# Patient Record
Sex: MALE | Race: WHITE | NOT HISPANIC OR LATINO | Employment: OTHER | URBAN - METROPOLITAN AREA
[De-identification: names, ages, dates, MRNs, and addresses within clinical notes are randomized per-mention and may not be internally consistent; named-entity substitution may affect disease eponyms.]

---

## 2017-01-10 RX ORDER — RIVAROXABAN 20 MG/1
TABLET, FILM COATED ORAL
Qty: 90 TABLET | Refills: 0 | Status: SHIPPED | OUTPATIENT
Start: 2017-01-10 | End: 2017-10-23 | Stop reason: SDUPTHER

## 2017-08-15 ENCOUNTER — TELEPHONE (OUTPATIENT)
Dept: CARDIOLOGY | Facility: CLINIC | Age: 77
End: 2017-08-15

## 2017-09-14 RX ORDER — DILTIAZEM HYDROCHLORIDE 120 MG/1
CAPSULE, EXTENDED RELEASE ORAL
Qty: 90 CAPSULE | Refills: 0 | OUTPATIENT
Start: 2017-09-14

## 2018-05-14 ENCOUNTER — OFFICE VISIT (OUTPATIENT)
Dept: CARDIOLOGY | Facility: CLINIC | Age: 78
End: 2018-05-14

## 2018-05-14 VITALS
WEIGHT: 213 LBS | BODY MASS INDEX: 29.82 KG/M2 | HEIGHT: 71 IN | SYSTOLIC BLOOD PRESSURE: 148 MMHG | DIASTOLIC BLOOD PRESSURE: 87 MMHG | HEART RATE: 75 BPM

## 2018-05-14 DIAGNOSIS — R06.02 SHORTNESS OF BREATH: ICD-10-CM

## 2018-05-14 DIAGNOSIS — R06.02 SOB (SHORTNESS OF BREATH): Primary | ICD-10-CM

## 2018-05-14 DIAGNOSIS — Z79.01 ANTICOAGULATED: ICD-10-CM

## 2018-05-14 DIAGNOSIS — R07.2 PRECORDIAL PAIN: ICD-10-CM

## 2018-05-14 DIAGNOSIS — R94.31 ABNORMAL EKG: ICD-10-CM

## 2018-05-14 DIAGNOSIS — I48.0 PAROXYSMAL ATRIAL FIBRILLATION (HCC): ICD-10-CM

## 2018-05-14 PROCEDURE — 93000 ELECTROCARDIOGRAM COMPLETE: CPT | Performed by: INTERNAL MEDICINE

## 2018-05-14 PROCEDURE — 99214 OFFICE O/P EST MOD 30 MIN: CPT | Performed by: INTERNAL MEDICINE

## 2018-05-14 RX ORDER — WARFARIN SODIUM 5 MG/1
TABLET ORAL
Qty: 30 TABLET | Refills: 3 | Status: SHIPPED | OUTPATIENT
Start: 2018-05-14 | End: 2018-05-21 | Stop reason: ALTCHOICE

## 2018-05-14 NOTE — PROGRESS NOTES
Subjective:       Doc Coyne is a 77 y.o. male who here for follow up    CC  Cp, tightness, sob  HPI  77 his old male with known history of the atrial Coblation, shortness of breath anticoagulation is complaining of the chest pains and tightness in chest , mild to moderate in intensity retrosternal associated with the shortness of breath     Problem List Items Addressed This Visit        Cardiovascular and Mediastinum    Atrial fibrillation    Relevant Orders    Protime-INR       Respiratory    Shortness of breath       Nervous and Auditory    Chest pain       Hematopoietic and Hemostatic    Anticoagulated      Other Visit Diagnoses     SOB (shortness of breath)    -  Primary    Relevant Orders    Stress Test With Myocardial Perfusion One Day    Adult Transthoracic Echo Complete W/ Cont if Necessary Per Protocol    Protime-INR    Abnormal EKG        Relevant Orders    Stress Test With Myocardial Perfusion One Day    Adult Transthoracic Echo Complete W/ Cont if Necessary Per Protocol    Protime-INR        .    The following portions of the patient's history were reviewed and updated as appropriate: allergies, current medications, past family history, past medical history, past social history, past surgical history and problem list.    Past Medical History:   Diagnosis Date   • Atherosclerotic heart disease of native coronary artery without angina pectoris    • Atrial fibrillation    • Benign prostatic hypertrophy    • Chest pain    • Diabetes mellitus    • Hypertension     reports that he has quit smoking. He has never used smokeless tobacco. He reports that he does not drink alcohol or use drugs.  Family History   Problem Relation Age of Onset   • Hypertension Father    • Heart failure Father    • Hyperlipidemia Father    • Heart disease Father    • Heart attack Father    • Heart attack Sister        Review of Systems  Constitutional: No wt loss, fever, fatigue  Gastrointestinal: No nausea, abdominal  "pain  Behavioral/Psych: No insomnia or anxiety   Cardiovascular Chest tightness  Objective:       Physical Exam           Physical Exam  /87   Pulse 75   Ht 180.3 cm (71\")   Wt 96.6 kg (213 lb)   BMI 29.71 kg/m²     General appearance: NAD, conversant   Eyes: anicteric sclerae, moist conjunctivae; no lid-lag; PERRLA   HENT: Atraumatic; oropharynx clear with moist mucous membranes and no mucosal ulcerations;  normal hard and soft palate   Neck: Trachea midline; FROM, supple, no thyromegaly or lymphadenopathy   Lungs: CTA, with normal respiratory effort and no intercostal retractions   CV: S1-S2 regular, no murmurs, no rub, no gallop   Abdomen: Soft, non-tender; no masses or HSM   Extremities: No peripheral edema or extremity lymphadenopathy  Skin: Normal temperature, turgor and texture; no rash, ulcers or subcutaneous nodules   Psych: Appropriate affect, alert and oriented to person, place and time           Cardiographics  @  ECG 12 Lead  Date/Time: 5/14/2018 12:38 PM  Performed by: NITA PIMENTEL  Authorized by: NITA PIMENTEL   Comparison: compared with previous ECG   Similar to previous ECG  Rhythm: atrial fibrillation  ST Flattening: all  Clinical impression: abnormal ECG            Echocardiogram:        Current Outpatient Prescriptions:   •  CARTIA  MG 24 hr capsule, TAKE 1 CAPSULE BY MOUTH DAILY, Disp: 90 capsule, Rfl: 5  •  lisinopril (PRINIVIL,ZESTRIL) 20 MG tablet, Take 1 tablet by mouth daily., Disp: , Rfl:   •  metFORMIN (GLUCOPHAGE) 500 MG tablet, Take 1 tablet by mouth every 12 (twelve) hours. TAKE 1 TABLET BY MOUTH EVERY 12 HOURS WITH FOOD, Disp: , Rfl:   •  metoprolol succinate XL (TOPROL-XL) 50 MG 24 hr tablet, Take 1 tablet by mouth daily., Disp: , Rfl:   •  NITROSTAT 0.4 MG SL tablet, , Disp: , Rfl: 1  •  rivaroxaban (XARELTO) 20 MG tablet, Take 1 tablet by mouth Daily With Dinner., Disp: 28 tablet, Rfl: 0   Assessment:        Patient Active Problem List   Diagnosis "   • Atrial fibrillation   • Chest pain   • Shortness of breath   • Preop cardiovascular exam               Plan:            ICD-10-CM ICD-9-CM   1. SOB (shortness of breath) R06.02 786.05   2. Abnormal EKG R94.31 794.31   3. Paroxysmal atrial fibrillation I48.0 427.31   4. Shortness of breath R06.02 786.05   5. Anticoagulated Z79.01 V58.61   6. Precordial pain R07.2 786.51     1. SOB (shortness of breath)  Considering the patient's symptoms as well as clinical situation and  EKG findings, along with cardiac risk factors, ischemic workup is necessary to rule out ischemic cardiomyopathy, stress induced arrhythmias, and functional capacity for diagnosis as well as prognostic consideration    Considering patient's medical condition as well as the risk factors, patient will require echocardiogram for further evaluation for the LV function, four-chamber evaluation, including the pressures, valvular function and  pericardial disease and pericardial effusion    - Stress Test With Myocardial Perfusion One Day  - Adult Transthoracic Echo Complete W/ Cont if Necessary Per Protocol  - Protime-INR; Standing    2. Abnormal EKG  Considering the patient's symptoms as well as clinical situation and  EKG findings, along with cardiac risk factors, ischemic workup is necessary to rule out ischemic cardiomyopathy, stress induced arrhythmias, and functional capacity for diagnosis as well as prognostic consideration    - Stress Test With Myocardial Perfusion One Day  - Adult Transthoracic Echo Complete W/ Cont if Necessary Per Protocol  - Protime-INR; Standing    3. Paroxysmal atrial fibrillation  Under control  - Protime-INR; Standing    4. Shortness of breath  Considering the patient's symptoms as well as clinical situation and  EKG findings, along with cardiac risk factors, ischemic workup is necessary to rule out ischemic cardiomyopathy, stress induced arrhythmias, and functional capacity for diagnosis as well as prognostic  consideration      5. Anticoagulated  Pros and cons as well as indication of the anticoagulation has been explained to the patient in detail    There are no obvious complications at this stage    Risk of  the bleedings has been explained    Need for the regular blood workup and adjust the dose has been explained    Need for proper follow-up on anticoagulation also has been explained      6. Precordial pain         Start coumadin  5mg 10 for 2 days    Than 2.5 mg po daily    Pros and cons of this new medication / change medication has been explained to  the patient    Possible side effects has been explained    Associated need of the blood  Work has been explained    Need for the compliance of the medication has been explained      brisa Ayala'    See us 2-4 wks  COUNSELING:    Doc Islas was given to patient for the following topics: diagnostic results, risk factor reductions, impressions, risks and benefits of treatment options and importance of treatment compliance .       SMOKING COUNSELING:    Counseling given: Not Answered      EMR Dragon/Transcription disclaimer:   Much of this encounter note is an electronic transcription/translation of spoken language to printed text. The electronic translation of spoken language may permit erroneous, or at times, nonsensical words or phrases to be inadvertently transcribed; Although I have reviewed the note for such errors, some may still exist.

## 2018-05-18 ENCOUNTER — TELEPHONE (OUTPATIENT)
Dept: CARDIOLOGY | Facility: CLINIC | Age: 78
End: 2018-05-18

## 2018-05-21 ENCOUNTER — ANTICOAGULATION VISIT (OUTPATIENT)
Dept: CARDIOLOGY | Facility: CLINIC | Age: 78
End: 2018-05-21

## 2018-05-21 ENCOUNTER — HOSPITAL ENCOUNTER (OUTPATIENT)
Dept: CARDIOLOGY | Facility: HOSPITAL | Age: 78
Discharge: HOME | End: 2018-05-21
Admitting: INTERNAL MEDICINE

## 2018-05-21 LAB — INR PPP: 1.2

## 2018-05-21 PROCEDURE — 85610 PROTHROMBIN TIME: CPT | Performed by: INTERNAL MEDICINE

## 2018-05-21 NOTE — PROGRESS NOTES
Mr Coyne does not want to be on warfarin. Has been on xarelto in the past and wants to go back on this. Spoke with dr vera he states patient may be started on xarelto after holding warfarin for 2 days.   Samples and instructions provided to mr coyne.

## 2018-06-06 ENCOUNTER — APPOINTMENT (OUTPATIENT)
Dept: CARDIOLOGY | Facility: HOSPITAL | Age: 78
End: 2018-06-06
Attending: INTERNAL MEDICINE

## 2018-06-06 ENCOUNTER — HOSPITAL ENCOUNTER (OUTPATIENT)
Dept: CARDIOLOGY | Facility: HOSPITAL | Age: 78
Discharge: HOME | End: 2018-06-06
Attending: INTERNAL MEDICINE | Admitting: INTERNAL MEDICINE

## 2018-06-06 ENCOUNTER — HOSPITAL ENCOUNTER (OUTPATIENT)
Dept: CARDIOLOGY | Facility: HOSPITAL | Age: 78
Discharge: HOME | End: 2018-06-06
Attending: INTERNAL MEDICINE

## 2018-06-06 VITALS
RESPIRATION RATE: 20 BRPM | WEIGHT: 213 LBS | DIASTOLIC BLOOD PRESSURE: 72 MMHG | BODY MASS INDEX: 29.82 KG/M2 | OXYGEN SATURATION: 97 % | SYSTOLIC BLOOD PRESSURE: 116 MMHG | HEIGHT: 71 IN | HEART RATE: 64 BPM

## 2018-06-06 VITALS — SYSTOLIC BLOOD PRESSURE: 135 MMHG | DIASTOLIC BLOOD PRESSURE: 83 MMHG | HEART RATE: 85 BPM

## 2018-06-06 LAB
BH CV ECHO MEAS - ACS: 1.5 CM
BH CV ECHO MEAS - AI DEC SLOPE: 159 CM/SEC^2
BH CV ECHO MEAS - AI MAX PG: 56.3 MMHG
BH CV ECHO MEAS - AI MAX VEL: 375 CM/SEC
BH CV ECHO MEAS - AI P1/2T: 690.8 MSEC
BH CV ECHO MEAS - AO MAX PG (FULL): 8.7 MMHG
BH CV ECHO MEAS - AO MAX PG: 10.5 MMHG
BH CV ECHO MEAS - AO MEAN PG (FULL): 5 MMHG
BH CV ECHO MEAS - AO MEAN PG: 6 MMHG
BH CV ECHO MEAS - AO ROOT AREA (BSA CORRECTED): 1.6
BH CV ECHO MEAS - AO ROOT AREA: 9.1 CM^2
BH CV ECHO MEAS - AO ROOT DIAM: 3.4 CM
BH CV ECHO MEAS - AO V2 MAX: 162 CM/SEC
BH CV ECHO MEAS - AO V2 MEAN: 112 CM/SEC
BH CV ECHO MEAS - AO V2 VTI: 35.2 CM
BH CV ECHO MEAS - AVA(I,A): 2 CM^2
BH CV ECHO MEAS - AVA(I,D): 2 CM^2
BH CV ECHO MEAS - AVA(V,A): 1.9 CM^2
BH CV ECHO MEAS - AVA(V,D): 1.9 CM^2
BH CV ECHO MEAS - BSA(HAYCOCK): 2.2 M^2
BH CV ECHO MEAS - BSA: 2.2 M^2
BH CV ECHO MEAS - BZI_BMI: 29.7 KILOGRAMS/M^2
BH CV ECHO MEAS - BZI_METRIC_HEIGHT: 180.3 CM
BH CV ECHO MEAS - BZI_METRIC_WEIGHT: 96.6 KG
BH CV ECHO MEAS - CONTRAST EF (2CH): 53.5 ML/M^2
BH CV ECHO MEAS - CONTRAST EF 4CH: 51.2 ML/M^2
BH CV ECHO MEAS - EDV(CUBED): 125 ML
BH CV ECHO MEAS - EDV(MOD-SP2): 71 ML
BH CV ECHO MEAS - EDV(MOD-SP4): 86 ML
BH CV ECHO MEAS - EDV(TEICH): 118.2 ML
BH CV ECHO MEAS - EF(CUBED): 65.7 %
BH CV ECHO MEAS - EF(MOD-BP): 51 %
BH CV ECHO MEAS - EF(MOD-SP2): 53.5 %
BH CV ECHO MEAS - EF(MOD-SP4): 51.2 %
BH CV ECHO MEAS - EF(TEICH): 57 %
BH CV ECHO MEAS - ESV(CUBED): 42.9 ML
BH CV ECHO MEAS - ESV(MOD-SP2): 33 ML
BH CV ECHO MEAS - ESV(MOD-SP4): 42 ML
BH CV ECHO MEAS - ESV(TEICH): 50.9 ML
BH CV ECHO MEAS - FS: 30 %
BH CV ECHO MEAS - IVS/LVPW: 1.2
BH CV ECHO MEAS - IVSD: 1.2 CM
BH CV ECHO MEAS - LA DIMENSION: 5.6 CM
BH CV ECHO MEAS - LA/AO: 1.6
BH CV ECHO MEAS - LAT PEAK E' VEL: 12.4 CM/SEC
BH CV ECHO MEAS - LV DIASTOLIC VOL/BSA (35-75): 39.7 ML/M^2
BH CV ECHO MEAS - LV MASS(C)D: 207.1 GRAMS
BH CV ECHO MEAS - LV MASS(C)DI: 95.6 GRAMS/M^2
BH CV ECHO MEAS - LV MAX PG: 1.8 MMHG
BH CV ECHO MEAS - LV MEAN PG: 1 MMHG
BH CV ECHO MEAS - LV SYSTOLIC VOL/BSA (12-30): 19.4 ML/M^2
BH CV ECHO MEAS - LV V1 MAX: 67.2 CM/SEC
BH CV ECHO MEAS - LV V1 MEAN: 52.6 CM/SEC
BH CV ECHO MEAS - LV V1 VTI: 15.5 CM
BH CV ECHO MEAS - LVIDD: 5 CM
BH CV ECHO MEAS - LVIDS: 3.5 CM
BH CV ECHO MEAS - LVLD AP2: 6.2 CM
BH CV ECHO MEAS - LVLD AP4: 6.9 CM
BH CV ECHO MEAS - LVLS AP2: 5.6 CM
BH CV ECHO MEAS - LVLS AP4: 6.3 CM
BH CV ECHO MEAS - LVOT AREA (M): 4.5 CM^2
BH CV ECHO MEAS - LVOT AREA: 4.5 CM^2
BH CV ECHO MEAS - LVOT DIAM: 2.4 CM
BH CV ECHO MEAS - LVPWD: 1 CM
BH CV ECHO MEAS - MED PEAK E' VEL: 11.1 CM/SEC
BH CV ECHO MEAS - MV DEC SLOPE: 260 CM/SEC^2
BH CV ECHO MEAS - MV DEC TIME: 0.17 SEC
BH CV ECHO MEAS - MV E MAX VEL: 101 CM/SEC
BH CV ECHO MEAS - MV MAX PG: 4.8 MMHG
BH CV ECHO MEAS - MV MEAN PG: 1 MMHG
BH CV ECHO MEAS - MV P1/2T MAX VEL: 107 CM/SEC
BH CV ECHO MEAS - MV P1/2T: 120.5 MSEC
BH CV ECHO MEAS - MV V2 MAX: 110 CM/SEC
BH CV ECHO MEAS - MV V2 MEAN: 52.3 CM/SEC
BH CV ECHO MEAS - MV V2 VTI: 24 CM
BH CV ECHO MEAS - MVA P1/2T LCG: 2.1 CM^2
BH CV ECHO MEAS - MVA(P1/2T): 1.8 CM^2
BH CV ECHO MEAS - MVA(VTI): 2.9 CM^2
BH CV ECHO MEAS - PA ACC TIME: 0.08 SEC
BH CV ECHO MEAS - PA MAX PG (FULL): 2.7 MMHG
BH CV ECHO MEAS - PA MAX PG: 4 MMHG
BH CV ECHO MEAS - PA PR(ACCEL): 42.1 MMHG
BH CV ECHO MEAS - PA V2 MAX: 100 CM/SEC
BH CV ECHO MEAS - PI END-D VEL: 107 CM/SEC
BH CV ECHO MEAS - PVA(V,A): 2.6 CM^2
BH CV ECHO MEAS - PVA(V,D): 2.6 CM^2
BH CV ECHO MEAS - QP/QS: 0.84
BH CV ECHO MEAS - RAP SYSTOLE: 3 MMHG
BH CV ECHO MEAS - RV MAX PG: 1.3 MMHG
BH CV ECHO MEAS - RV MEAN PG: 1 MMHG
BH CV ECHO MEAS - RV V1 MAX: 56.9 CM/SEC
BH CV ECHO MEAS - RV V1 MEAN: 40.9 CM/SEC
BH CV ECHO MEAS - RV V1 VTI: 13 CM
BH CV ECHO MEAS - RVDD: 3.6 CM
BH CV ECHO MEAS - RVOT AREA: 4.5 CM^2
BH CV ECHO MEAS - RVOT DIAM: 2.4 CM
BH CV ECHO MEAS - RVSP: 25.8 MMHG
BH CV ECHO MEAS - SI(AO): 147.5 ML/M^2
BH CV ECHO MEAS - SI(CUBED): 37.9 ML/M^2
BH CV ECHO MEAS - SI(LVOT): 32.4 ML/M^2
BH CV ECHO MEAS - SI(MOD-SP2): 17.5 ML/M^2
BH CV ECHO MEAS - SI(MOD-SP4): 20.3 ML/M^2
BH CV ECHO MEAS - SI(TEICH): 31.1 ML/M^2
BH CV ECHO MEAS - SV(AO): 319.6 ML
BH CV ECHO MEAS - SV(CUBED): 82.1 ML
BH CV ECHO MEAS - SV(LVOT): 70.1 ML
BH CV ECHO MEAS - SV(MOD-SP2): 38 ML
BH CV ECHO MEAS - SV(MOD-SP4): 44 ML
BH CV ECHO MEAS - SV(RVOT): 58.8 ML
BH CV ECHO MEAS - SV(TEICH): 67.4 ML
BH CV ECHO MEAS - TR MAX VEL: 239 CM/SEC
BH CV ECHO MEASUREMENTS AVERAGE E/E' RATIO: 8.6
BH CV XLRA - RV BASE: 3.8 CM
BH CV XLRA - RV LENGTH: 6.4 CM
BH CV XLRA - RV MID: 3 CM
BH CV XLRA - TDI S': 12.1 CM/SEC
LEFT ATRIUM VOLUME INDEX: 40 ML/M2
MAXIMAL PREDICTED HEART RATE: 144 BPM
STRESS TARGET HR: 122 BPM

## 2018-06-06 PROCEDURE — 93306 TTE W/DOPPLER COMPLETE: CPT | Performed by: INTERNAL MEDICINE

## 2018-06-06 PROCEDURE — 93306 TTE W/DOPPLER COMPLETE: CPT

## 2018-06-06 PROCEDURE — 0399T ADULT TRANSTHORACIC ECHO COMPLETE W/ CONT IF NECESSARY PER PROTOCOL: CPT | Performed by: INTERNAL MEDICINE

## 2018-06-06 PROCEDURE — 25010000002 REGADENOSON 0.4 MG/5ML SOLUTION: Performed by: INTERNAL MEDICINE

## 2018-06-06 PROCEDURE — 0 TECHNETIUM SESTAMIBI: Performed by: INTERNAL MEDICINE

## 2018-06-06 PROCEDURE — 78452 HT MUSCLE IMAGE SPECT MULT: CPT | Performed by: INTERNAL MEDICINE

## 2018-06-06 PROCEDURE — A9500 TC99M SESTAMIBI: HCPCS | Performed by: INTERNAL MEDICINE

## 2018-06-06 PROCEDURE — 78452 HT MUSCLE IMAGE SPECT MULT: CPT

## 2018-06-06 PROCEDURE — 0399T HC MYOCARDL STRAIN IMAG QUAN ASSMT PER SESS: CPT

## 2018-06-06 PROCEDURE — 93017 CV STRESS TEST TRACING ONLY: CPT

## 2018-06-06 PROCEDURE — 93016 CV STRESS TEST SUPVJ ONLY: CPT | Performed by: INTERNAL MEDICINE

## 2018-06-06 PROCEDURE — 93018 CV STRESS TEST I&R ONLY: CPT | Performed by: INTERNAL MEDICINE

## 2018-06-06 RX ADMIN — TECHNETIUM TC 99M SESTAMIBI 1 DOSE: 1 INJECTION INTRAVENOUS at 10:52

## 2018-06-06 RX ADMIN — TECHNETIUM TC 99M SESTAMIBI 1 DOSE: 1 INJECTION INTRAVENOUS at 07:59

## 2018-06-06 RX ADMIN — REGADENOSON 0.4 MG: 0.08 INJECTION, SOLUTION INTRAVENOUS at 10:52

## 2018-06-07 LAB
BH CV STRESS BP STAGE 1: NORMAL
BH CV STRESS COMMENTS STAGE 1: NORMAL
BH CV STRESS DOSE REGADENOSON STAGE 1: 0.4
BH CV STRESS DURATION MIN STAGE 1: 2
BH CV STRESS DURATION SEC STAGE 1: 27
BH CV STRESS GRADE STAGE 1: 0
BH CV STRESS HR STAGE 1: 103
BH CV STRESS METS STAGE 1: 1.4
BH CV STRESS PROTOCOL 1: NORMAL
BH CV STRESS RECOVERY BP: NORMAL MMHG
BH CV STRESS RECOVERY HR: 88 BPM
BH CV STRESS RECOVERY O2: 98 %
BH CV STRESS SPEED STAGE 1: 1
BH CV STRESS STAGE 1: 1
LV EF NUC BP: 62 %
MAXIMAL PREDICTED HEART RATE: 144 BPM
PERCENT MAX PREDICTED HR: 71.53 %
STRESS BASELINE BP: NORMAL MMHG
STRESS BASELINE HR: 73 BPM
STRESS O2 SAT REST: 97 %
STRESS PERCENT HR: 84 %
STRESS POST ESTIMATED WORKLOAD: 1.4 METS
STRESS POST EXERCISE DUR MIN: 2 MIN
STRESS POST EXERCISE DUR SEC: 27 SEC
STRESS POST PEAK BP: NORMAL MMHG
STRESS POST PEAK HR: 103 BPM
STRESS TARGET HR: 122 BPM

## 2018-09-07 ENCOUNTER — TELEPHONE (OUTPATIENT)
Dept: CARDIOLOGY | Facility: CLINIC | Age: 78
End: 2018-09-07

## 2018-09-07 NOTE — TELEPHONE ENCOUNTER
----- Message from Lori Marinelli sent at 9/6/2018  1:09 PM EDT -----  Regarding: PLEASE CALL XARELTO SAMPLES  PATIENT IS REQUESTING XARELTO SAMPLES BUT I DO NOT SEE THAT ON HIS MED LIST. PLEASE CALL AS HE WANTS TO PICK THEM UP TOMORROW AFTERNOON.  THANKS    Samples ready to be picked up       Called l/m

## 2019-02-21 ENCOUNTER — TELEPHONE (OUTPATIENT)
Dept: CARDIOLOGY | Facility: CLINIC | Age: 79
End: 2019-02-21

## 2019-02-21 NOTE — TELEPHONE ENCOUNTER
----- Message from Lori Marinelli sent at 2/21/2019 10:17 AM EST -----  Regarding: SAMPLES  (XARELTO) 20 MG tablet  PATIENT WOULD LIKE TO  TOMORROW AFTER 1  rivaroxaban (XARELTO) 20 MG tablet

## 2019-07-26 ENCOUNTER — OFFICE VISIT (OUTPATIENT)
Dept: CARDIOLOGY | Facility: CLINIC | Age: 79
End: 2019-07-26

## 2019-07-26 VITALS
BODY MASS INDEX: 29.82 KG/M2 | HEART RATE: 75 BPM | HEIGHT: 71 IN | DIASTOLIC BLOOD PRESSURE: 63 MMHG | SYSTOLIC BLOOD PRESSURE: 116 MMHG | WEIGHT: 213 LBS

## 2019-07-26 DIAGNOSIS — I48.0 PAROXYSMAL ATRIAL FIBRILLATION (HCC): ICD-10-CM

## 2019-07-26 DIAGNOSIS — R07.2 PRECORDIAL PAIN: Primary | ICD-10-CM

## 2019-07-26 PROCEDURE — 99213 OFFICE O/P EST LOW 20 MIN: CPT | Performed by: NURSE PRACTITIONER

## 2019-07-26 PROCEDURE — 93000 ELECTROCARDIOGRAM COMPLETE: CPT | Performed by: NURSE PRACTITIONER

## 2019-07-26 NOTE — PROGRESS NOTES
Patient Name: Doc Coyne  :1940  Age: 79 y.o.  Primary Cardiologist: Joel Rosado MD  Encounter Provider:  NELSON Waldrop      Chief Complaint:   Chief Complaint   Patient presents with   • Atrial Fibrillation   • Shortness of Breath         HPI this is a 71-year-old male, new to this provider, who comes today in follow-up with history of atrial fibrillation, shortness of breath, chest pain, and chronic anticoagulation.  ECG today in office reveals atrial fibrillation, rate 60s.  Patient is anticoagulated on Xarelto and on diltiazem as well as beta-blockade.  Last echo in 2018 revealed mild pulmonic valve regurgitation, calcification of aortic valve, mild dilation of RV C, mild to moderate TR, mild to moderate dilation of LAC, mild MR, EF 51% with no evidence of pericardial effusion.  Stress testing done at that time revealed no evidence of myocardial ischemia.  Catheterization in  revealed normal left main, LAD with early atherosclerotic plaque, circumflex nondominant with no atherosclerotic narrowing, RCA dominant with early atherosclerotic narrowing and mild LV dysfunction, the patient was noted to be best treated medically at that time.  Last lipid panel in May 2019 revealed triglycerides 122  HDL 39 LDL 87, the patient is not currently on statin therapy.  Well-controlled.  Patient is currently sinus rhythm on ECG today.    Patient Active Problem List   Diagnosis   • Atrial fibrillation (CMS/HCC)   • Chest pain   • Shortness of breath   • Preop cardiovascular exam   • Anticoagulated           The following portions of the patient's history were reviewed and updated as appropriate: allergies, current medications, past family history, past medical history, past social history, past surgical history and problem list.    Current Outpatient Medications on File Prior to Visit   Medication Sig Dispense Refill   • lisinopril (PRINIVIL,ZESTRIL) 20 MG tablet Take 1 tablet  by mouth daily.     • metFORMIN (GLUCOPHAGE) 500 MG tablet Take 1 tablet by mouth every 12 (twelve) hours. TAKE 1 TABLET BY MOUTH EVERY 12 HOURS WITH FOOD     • metoprolol succinate XL (TOPROL-XL) 50 MG 24 hr tablet Take 1 tablet by mouth daily.     • rivaroxaban (XARELTO) 20 MG tablet Take 1 tablet by mouth Daily. 30 tablet 0   • CARTIA  MG 24 hr capsule TAKE 1 CAPSULE BY MOUTH DAILY 90 capsule 5   • NITROSTAT 0.4 MG SL tablet   1     No current facility-administered medications on file prior to visit.        Past Medical History:   Diagnosis Date   • Atherosclerotic heart disease of native coronary artery without angina pectoris    • Atrial fibrillation (CMS/HCC)    • Benign prostatic hypertrophy    • Chest pain    • Diabetes mellitus (CMS/HCC)    • Hypertension    • Prostate cancer (CMS/HCC)        Past Surgical History:   Procedure Laterality Date   • COLONOSCOPY     • HEMORRHOIDECTOMY     • PROSTATE SURGERY         Family History   Problem Relation Age of Onset   • Hypertension Father    • Heart failure Father    • Hyperlipidemia Father    • Heart disease Father    • Heart attack Father    • Heart attack Sister        Social History     Socioeconomic History   • Marital status:      Spouse name: Not on file   • Number of children: Not on file   • Years of education: Not on file   • Highest education level: Not on file   Tobacco Use   • Smoking status: Former Smoker   • Smokeless tobacco: Never Used   Substance and Sexual Activity   • Alcohol use: Yes     Comment: OCCASIONALLY   • Drug use: No   • Sexual activity: Defer       Review of Systems   Constitution: Positive for malaise/fatigue. Negative for diaphoresis and weakness.   HENT: Negative for nosebleeds and stridor.    Cardiovascular: Positive for chest pain and dyspnea on exertion. Negative for claudication, irregular heartbeat, leg swelling, near-syncope, orthopnea, palpitations, paroxysmal nocturnal dyspnea and syncope.        Intermittent,  "not active.   Respiratory: Negative for cough, hemoptysis, shortness of breath and wheezing.    Gastrointestinal: Negative for abdominal pain, constipation, diarrhea, hematemesis, hematochezia, melena, nausea and vomiting.   Neurological: Negative for dizziness, focal weakness and light-headedness.       OBJECTIVE:   Vital Signs  Vitals:    07/26/19 1044   BP: 116/63   Pulse: 75     Estimated body mass index is 29.71 kg/m² as calculated from the following:    Height as of this encounter: 180.3 cm (71\").    Weight as of this encounter: 96.6 kg (213 lb).    Physical Exam   Constitutional: He is oriented to person, place, and time. He appears well-developed and well-nourished. No distress.   HENT:   Head: Normocephalic and atraumatic.   Eyes: Conjunctivae and EOM are normal. Pupils are equal, round, and reactive to light.   Neck: Normal range of motion. Neck supple. No JVD present. No tracheal deviation present. No thyromegaly present.   Cardiovascular: Normal rate, normal heart sounds and intact distal pulses. Exam reveals no gallop and no friction rub.   No murmur heard.  Pulmonary/Chest: Effort normal and breath sounds normal. No respiratory distress. He has no wheezes. He has no rales. He exhibits no tenderness.   Abdominal: Soft. Bowel sounds are normal. He exhibits no distension. There is no tenderness.   Musculoskeletal: Normal range of motion. He exhibits no edema, tenderness or deformity.   Neurological: He is alert and oriented to person, place, and time.   Skin: Skin is warm and dry. No rash noted. He is not diaphoretic. No erythema. No pallor.   Psychiatric: He has a normal mood and affect. His behavior is normal.         ECG 12 Lead  Date/Time: 7/26/2019 10:44 AM  Performed by: Tiffanie Maya APRN  Authorized by: Tiffanie Maya APRN   Comparison: compared with previous ECG   Similar to previous ECG  Rhythm: atrial fibrillation  Rate: normal  Conduction: conduction normal  QRS axis: " normal            Cardiac catheterization    CORONARY ANGIOGRAM:   1.  The left main was normal, bifurcated into left anterior descending and  circumflex in a normal fashion.   2.  Left anterior descending shows early atherosclerotic plaque only, including  the diagonal and  branches.   3.  Circumflex artery was nondominant and free of any atherosclerotic  narrowing.   4.  The right coronary artery was dominant with early atherosclerotic  narrowing.   5.  Left ventriculogram showed mild LV dysfunction.      FINAL CONCLUSIONS:   1.  Early atherosclerotic plaque.   2.  Mild LV dysfunction.      DISCUSSION:  Considering the patient's early atherosclerotic plaque with mild  LV dysfunction, the patient will be treated medically.        Stress Testin2018  Interpretation Summary        · Findings consistent with a normal ECG stress test.  · Left ventricular ejection fraction is normal (Calculated EF = 62%).  · Myocardial perfusion imaging indicates a normal myocardial perfusion study with no evidence of ischemia.  · Impressions are consistent with a low risk study.     Asymptomatic for chest pain. ECG is negative for ischemia.   Ectopy: Baseline rhythm is Atrial Fibrillation with rare PVC in recovery  B/P is appropriate.   Pharmacologic study due to Beta-blocker therapy and mobility issues.  Participated in Low Level exercise and tolerance is fair.          Cardiac Echo:  2019  Interpretation Summary     · Mild pulmonic valve regurgitation is present.  · There is calcification of the aortic valve.  · Right ventricular cavity is mildly dilated.  · Mild to moderate tricuspid valve regurgitation is present.  · Left atrial cavity size is mild-to-moderately dilated.  · Mild mitral valve regurgitation is present  · Calculated EF = 51%  · There is no evidence of pericardial effusion.           ASSESSMENT:      Diagnosis Plan   1. Precordial pain  Stress Test With Myocardial Perfusion One Day   2. Paroxysmal  atrial fibrillation (CMS/[facility])  ECG 12 Lead    Stress Test With Myocardial Perfusion One Day         PLAN OF CARE:     1.  CAD-last cardiac catheterization and ischemic work-up as above.  Currently on beta-blockade.    2.  Hypertension-blood pressure in office today is currently controlled.  Patient is on lisinopril, metoprolol, as well as diltiazem.  Continue current medication regimen    Importance of controlling hypertension and blood pressure  checkup on the regular basis has been explained  Hypertension as a silent killer has been discussed  Risk reduction of the weight and regular exercises to control the hypertension has been explained      3.  Hyperlipidemia-currently well controlled off statin therapy.  Repeat lipid panel and CMP in 6 to 12 months.    Risk of the hyperlipidemia, importance of the treatment has been explained  Pros and cons of the statins has been explained  Regular blood workup as well as side effects including the liver failure, myelopathy death has been explained     4.  Atrial fibrillation-patient anticoagulated on Xarelto and rate controlled with beta-blockade.  ECG in office today reveals atrial fibrillation, rate 60s.  Continue current medication regimen.    In the setting of atrial fibrillation, I have discussed with the patient/family/POA the risks and benefits of anticoagulation therapy which include increased risk of bleeding and decreased risk of systemic embolization such as stroke. Patient/family/POA verbalize understanding and agree with treatment plan.        5.  Chest pain-noted to be intermittent, not exacerbated or alleviated by anything of note, not active.  Does not radiate anywhere.  Previous echo as above.  Will obtain updated stress test.  The patient does state he has been under a significant amount of stress as he is going through divorce.    It was explained to the patient that stress testing carries 85% specificity/sensitivity, and does not rule out future cardiac  event.  Risks of the procedure were explained to the patient including shortness of breath, induction of myocardial infarction, and dizziness.  Patient is agreeable to proceeding with stress testing.     Walking Lexiscan, follow-up in 4 weeks or sooner if needed.    Sincerely,   NELSON Waldrop  [facility]  07/26/19  12:47 PM

## 2019-08-08 ENCOUNTER — HOSPITAL ENCOUNTER (OUTPATIENT)
Dept: CARDIOLOGY | Facility: HOSPITAL | Age: 79
Discharge: HOME | End: 2019-08-08

## 2019-08-08 VITALS
WEIGHT: 213 LBS | DIASTOLIC BLOOD PRESSURE: 83 MMHG | HEIGHT: 72 IN | SYSTOLIC BLOOD PRESSURE: 152 MMHG | BODY MASS INDEX: 28.85 KG/M2 | OXYGEN SATURATION: 99 % | RESPIRATION RATE: 16 BRPM | HEART RATE: 60 BPM

## 2019-08-08 PROCEDURE — 93018 CV STRESS TEST I&R ONLY: CPT | Performed by: INTERNAL MEDICINE

## 2019-08-08 PROCEDURE — 25010000002 REGADENOSON 0.4 MG/5ML SOLUTION: Performed by: INTERNAL MEDICINE

## 2019-08-08 PROCEDURE — A9500 TC99M SESTAMIBI: HCPCS | Performed by: INTERNAL MEDICINE

## 2019-08-08 PROCEDURE — 78452 HT MUSCLE IMAGE SPECT MULT: CPT | Performed by: INTERNAL MEDICINE

## 2019-08-08 PROCEDURE — 0 TECHNETIUM SESTAMIBI: Performed by: INTERNAL MEDICINE

## 2019-08-08 PROCEDURE — 93017 CV STRESS TEST TRACING ONLY: CPT

## 2019-08-08 PROCEDURE — 78452 HT MUSCLE IMAGE SPECT MULT: CPT

## 2019-08-08 PROCEDURE — 93016 CV STRESS TEST SUPVJ ONLY: CPT | Performed by: INTERNAL MEDICINE

## 2019-08-08 RX ORDER — LISINOPRIL 30 MG/1
30 TABLET ORAL DAILY
Refills: 2 | COMMUNITY
Start: 2019-08-01 | End: 2020-11-05 | Stop reason: SDUPTHER

## 2019-08-08 RX ADMIN — TECHNETIUM TC 99M SESTAMIBI 1 DOSE: 1 INJECTION INTRAVENOUS at 12:05

## 2019-08-08 RX ADMIN — TECHNETIUM TC 99M SESTAMIBI 1 DOSE: 1 INJECTION INTRAVENOUS at 08:11

## 2019-08-08 RX ADMIN — REGADENOSON 0.4 MG: 0.08 INJECTION, SOLUTION INTRAVENOUS at 12:06

## 2019-08-09 ENCOUNTER — HOSPITAL ENCOUNTER (EMERGENCY)
Facility: HOSPITAL | Age: 79
Discharge: HOME | End: 2019-08-09
Attending: EMERGENCY MEDICINE | Admitting: EMERGENCY MEDICINE

## 2019-08-09 ENCOUNTER — APPOINTMENT (OUTPATIENT)
Dept: CT IMAGING | Facility: HOSPITAL | Age: 79
End: 2019-08-09

## 2019-08-09 VITALS
WEIGHT: 213 LBS | HEIGHT: 71 IN | DIASTOLIC BLOOD PRESSURE: 60 MMHG | SYSTOLIC BLOOD PRESSURE: 119 MMHG | BODY MASS INDEX: 29.82 KG/M2 | OXYGEN SATURATION: 98 % | TEMPERATURE: 97.8 F | HEART RATE: 67 BPM | RESPIRATION RATE: 16 BRPM

## 2019-08-09 DIAGNOSIS — R10.9 RIGHT FLANK PAIN: Primary | ICD-10-CM

## 2019-08-09 DIAGNOSIS — R31.9 HEMATURIA, UNSPECIFIED TYPE: ICD-10-CM

## 2019-08-09 DIAGNOSIS — R93.2 ABNORMAL CT SCAN, GALLBLADDER: ICD-10-CM

## 2019-08-09 DIAGNOSIS — D68.9 COAGULOPATHY (HCC): ICD-10-CM

## 2019-08-09 LAB
ALBUMIN SERPL-MCNC: 4.6 G/DL (ref 3.5–5.2)
ALBUMIN/GLOB SERPL: 1.8 G/DL
ALP SERPL-CCNC: 54 U/L (ref 39–117)
ALT SERPL W P-5'-P-CCNC: 22 U/L (ref 1–41)
ANION GAP SERPL CALCULATED.3IONS-SCNC: 12.5 MMOL/L (ref 5–15)
AST SERPL-CCNC: 17 U/L (ref 1–40)
BACTERIA UR QL AUTO: ABNORMAL /HPF
BASOPHILS # BLD AUTO: 0.04 10*3/MM3 (ref 0–0.2)
BASOPHILS NFR BLD AUTO: 0.6 % (ref 0–1.5)
BILIRUB SERPL-MCNC: 0.7 MG/DL (ref 0.2–1.2)
BILIRUB UR QL STRIP: NEGATIVE
BUN BLD-MCNC: 18 MG/DL (ref 8–23)
BUN/CREAT SERPL: 20.7 (ref 7–25)
CALCIUM SPEC-SCNC: 9.1 MG/DL (ref 8.6–10.5)
CHLORIDE SERPL-SCNC: 103 MMOL/L (ref 98–107)
CLARITY UR: CLEAR
CO2 SERPL-SCNC: 25.5 MMOL/L (ref 22–29)
COLOR UR: YELLOW
CREAT BLD-MCNC: 0.87 MG/DL (ref 0.76–1.27)
DEPRECATED RDW RBC AUTO: 45.8 FL (ref 37–54)
EOSINOPHIL # BLD AUTO: 0.05 10*3/MM3 (ref 0–0.4)
EOSINOPHIL NFR BLD AUTO: 0.7 % (ref 0.3–6.2)
ERYTHROCYTE [DISTWIDTH] IN BLOOD BY AUTOMATED COUNT: 13.6 % (ref 12.3–15.4)
GFR SERPL CREATININE-BSD FRML MDRD: 85 ML/MIN/1.73
GLOBULIN UR ELPH-MCNC: 2.6 GM/DL
GLUCOSE BLD-MCNC: 115 MG/DL (ref 65–99)
GLUCOSE UR STRIP-MCNC: NEGATIVE MG/DL
HCT VFR BLD AUTO: 44.3 % (ref 37.5–51)
HGB BLD-MCNC: 14.8 G/DL (ref 13–17.7)
HGB UR QL STRIP.AUTO: ABNORMAL
HYALINE CASTS UR QL AUTO: ABNORMAL /LPF
IMM GRANULOCYTES # BLD AUTO: 0.09 10*3/MM3 (ref 0–0.05)
IMM GRANULOCYTES NFR BLD AUTO: 1.3 % (ref 0–0.5)
KETONES UR QL STRIP: NEGATIVE
LEUKOCYTE ESTERASE UR QL STRIP.AUTO: NEGATIVE
LIPASE SERPL-CCNC: 19 U/L (ref 13–60)
LYMPHOCYTES # BLD AUTO: 2.2 10*3/MM3 (ref 0.7–3.1)
LYMPHOCYTES NFR BLD AUTO: 31.7 % (ref 19.6–45.3)
MAGNESIUM SERPL-MCNC: 2 MG/DL (ref 1.6–2.4)
MCH RBC QN AUTO: 30.5 PG (ref 26.6–33)
MCHC RBC AUTO-ENTMCNC: 33.4 G/DL (ref 31.5–35.7)
MCV RBC AUTO: 91.3 FL (ref 79–97)
MONOCYTES # BLD AUTO: 0.5 10*3/MM3 (ref 0.1–0.9)
MONOCYTES NFR BLD AUTO: 7.2 % (ref 5–12)
NEUTROPHILS # BLD AUTO: 4.07 10*3/MM3 (ref 1.7–7)
NEUTROPHILS NFR BLD AUTO: 58.5 % (ref 42.7–76)
NITRITE UR QL STRIP: NEGATIVE
NRBC BLD AUTO-RTO: 0 /100 WBC (ref 0–0.2)
PH UR STRIP.AUTO: 6 [PH] (ref 5–8)
PLATELET # BLD AUTO: 155 10*3/MM3 (ref 140–450)
PMV BLD AUTO: 10.7 FL (ref 6–12)
POTASSIUM BLD-SCNC: 4.2 MMOL/L (ref 3.5–5.2)
PROT SERPL-MCNC: 7.2 G/DL (ref 6–8.5)
PROT UR QL STRIP: NEGATIVE
RBC # BLD AUTO: 4.85 10*6/MM3 (ref 4.14–5.8)
RBC # UR: ABNORMAL /HPF
REF LAB TEST METHOD: ABNORMAL
SODIUM BLD-SCNC: 141 MMOL/L (ref 136–145)
SP GR UR STRIP: 1.02 (ref 1–1.03)
SQUAMOUS #/AREA URNS HPF: ABNORMAL /HPF
TSH SERPL DL<=0.05 MIU/L-ACNC: 1.71 MIU/ML (ref 0.27–4.2)
UROBILINOGEN UR QL STRIP: ABNORMAL
WBC NRBC COR # BLD: 6.95 10*3/MM3 (ref 3.4–10.8)
WBC UR QL AUTO: ABNORMAL /HPF

## 2019-08-09 PROCEDURE — 80053 COMPREHEN METABOLIC PANEL: CPT | Performed by: EMERGENCY MEDICINE

## 2019-08-09 PROCEDURE — 81001 URINALYSIS AUTO W/SCOPE: CPT | Performed by: EMERGENCY MEDICINE

## 2019-08-09 PROCEDURE — 83690 ASSAY OF LIPASE: CPT | Performed by: EMERGENCY MEDICINE

## 2019-08-09 PROCEDURE — 85025 COMPLETE CBC W/AUTO DIFF WBC: CPT | Performed by: EMERGENCY MEDICINE

## 2019-08-09 PROCEDURE — 83735 ASSAY OF MAGNESIUM: CPT | Performed by: EMERGENCY MEDICINE

## 2019-08-09 PROCEDURE — 99283 EMERGENCY DEPT VISIT LOW MDM: CPT

## 2019-08-09 PROCEDURE — 74176 CT ABD & PELVIS W/O CONTRAST: CPT

## 2019-08-09 PROCEDURE — 84443 ASSAY THYROID STIM HORMONE: CPT | Performed by: EMERGENCY MEDICINE

## 2019-08-09 RX ORDER — SODIUM CHLORIDE 0.9 % (FLUSH) 0.9 %
10 SYRINGE (ML) INJECTION AS NEEDED
Status: DISCONTINUED | OUTPATIENT
Start: 2019-08-09 | End: 2019-08-09 | Stop reason: HOSPADM

## 2019-08-09 NOTE — DISCHARGE INSTRUCTIONS
Take Tylenol for pain.    Make certain you follow-up with Dr. Ferris seen in concerning the very nonspecific finding of a very mild and thin fluid layer around the gallbladder.  Return if you have worsening of pain, any vomiting, fever, or any concerns.

## 2019-08-09 NOTE — ED NOTES
Pt reports right sided abd pain that started about a week ago. Pt denies NVD. Pt reports blood in his urine. Pt reports he had a prostate surgery about 4 months ago. Pt reports lifting heavy boxes. Pt reports pain 4/10. Pt denies painful urination.      Maria M Maya, RN  08/09/19 6725

## 2019-08-09 NOTE — ED PROVIDER NOTES
" EMERGENCY DEPARTMENT ENCOUNTER    CHIEF COMPLAINT  Chief Complaint: flank pain  History given by: patient  History limited by: nothing  Room Number: 02/02  PMD: Luiz Nicolas MD      HPI:  Pt is a 79 y.o. male who presents complaining of intermittent right flank pain with radiation to the RLQ of the abdomen for the past three weeks. The patient denies any radiation down his legs. The patient reports the pain is exacerbated with twisting his torso. The patient reports the pain is improved with ambulating. The patient reports he has been lifting heavy boxes recently, but denies known injury or recent trauma. The patient denies any pain currently. The patient also complains of intermittent hematuria for the past four months (since having prostate surgery done by Dr. Maximiliano Mejia) but denies any vomiting, diarrhea, blood in stool, chest pain, shortness of breath, or decreased appetite. The patient reports he had \"not had the same energy and pep\" for the past several weeks. The patient reports he is currently on Xarelto. He has a hx of diabetes. The patient admits to being a current smoker. There are no other complaints at this time.      Duration: three weeks  Onset: gradual  Timing: intermittent  Location: right flank  Radiation: RLQ of abdomen  Quality: pain  Intensity/Severity: moderate  Progression: resolved  Associated Symptoms: hematuria  Aggravating Factors: The patient reports the pain is exacerbated with twisting his torso  Alleviating Factors: The patient reports the pain is improved with ambulating.  Previous Episodes: none specified  Treatment before arrival: none specified    PAST MEDICAL HISTORY  Active Ambulatory Problems     Diagnosis Date Noted   • Atrial fibrillation (CMS/HCC) 02/08/2016   • Chest pain 02/08/2016   • Shortness of breath 02/08/2016   • Preop cardiovascular exam 02/08/2016   • Anticoagulated 05/14/2018     Resolved Ambulatory Problems     Diagnosis Date Noted   • No Resolved " Ambulatory Problems     Past Medical History:   Diagnosis Date   • Atherosclerotic heart disease of native coronary artery without angina pectoris    • Atrial fibrillation (CMS/HCC)    • Benign prostatic hypertrophy    • Chest pain    • COPD (chronic obstructive pulmonary disease) (CMS/HCC)    • Diabetes mellitus (CMS/HCC)    • Hypertension    • Prostate cancer (CMS/HCC)        PAST SURGICAL HISTORY  Past Surgical History:   Procedure Laterality Date   • COLONOSCOPY     • HEMORRHOIDECTOMY     • PROSTATE SURGERY         FAMILY HISTORY  Family History   Problem Relation Age of Onset   • Hypertension Father    • Heart failure Father    • Hyperlipidemia Father    • Heart disease Father    • Heart attack Father    • Heart attack Sister    • Stroke Mother        SOCIAL HISTORY  Social History     Socioeconomic History   • Marital status:      Spouse name: Not on file   • Number of children: Not on file   • Years of education: Not on file   • Highest education level: Not on file   Tobacco Use   • Smoking status: Current Every Day Smoker     Packs/day: 0.50   • Smokeless tobacco: Never Used   Substance and Sexual Activity   • Alcohol use: Yes     Comment: OCCASIONALLY   • Drug use: No   • Sexual activity: Defer       ALLERGIES  Isosorbide nitrate; Novocain  [procaine]; Penicillins; Propoxyphene; Cephalexin; Doxycycline; and Sulfa antibiotics    REVIEW OF SYSTEMS  Review of Systems   Constitutional: Positive for activity change (decreased energy). Negative for appetite change (decreased) and fever.   HENT: Negative for congestion and sore throat.    Eyes: Negative.    Respiratory: Negative for cough and shortness of breath.    Cardiovascular: Negative for chest pain and leg swelling.   Gastrointestinal: Negative for abdominal pain, blood in stool, diarrhea and vomiting.   Endocrine: Negative.    Genitourinary: Positive for flank pain (R flank with radiation to RLQ) and hematuria. Negative for decreased urine volume  and dysuria.   Musculoskeletal: Negative for neck pain.   Skin: Negative for rash and wound.   Allergic/Immunologic: Negative.    Neurological: Negative for weakness, numbness and headaches.   Hematological: Negative.    Psychiatric/Behavioral: Negative.    All other systems reviewed and are negative.      PHYSICAL EXAM  ED Triage Vitals   Temp Heart Rate Resp BP SpO2   08/09/19 1539 08/09/19 1539 08/09/19 1539 08/09/19 1558 08/09/19 1539   98 °F (36.7 °C) 66 18 141/82 96 %      Temp src Heart Rate Source Patient Position BP Location FiO2 (%)   08/09/19 1539 08/09/19 1539 08/09/19 1558 08/09/19 1558 --   Tympanic Monitor Lying Left arm        Physical Exam   Constitutional: He is oriented to person, place, and time. No distress.   HENT:   Head: Normocephalic and atraumatic.   Eyes: EOM are normal. Pupils are equal, round, and reactive to light.   Neck: Normal range of motion. Neck supple.   Cardiovascular: Normal rate, regular rhythm, normal heart sounds and intact distal pulses. Exam reveals no gallop and no friction rub.   No murmur heard.  Pulmonary/Chest: Effort normal. No respiratory distress. He has no decreased breath sounds. He has wheezes (slight expiratory). He has no rhonchi. He has no rales. He exhibits no tenderness.   Oxygen saturation is 100% on RA.   Abdominal: Soft. Bowel sounds are normal. He exhibits no distension and no mass. There is no tenderness. There is no rebound and no guarding.   The pain is not reproducible with palpation.   Genitourinary: He exhibits no testicular tenderness.   Genitourinary Comments: No testicular swelling.   Musculoskeletal: Normal range of motion. He exhibits edema (1+ pedal edema of BLE which is chronic).   Neurological: He is alert and oriented to person, place, and time. He has normal sensation and normal strength.   Skin: Skin is warm and dry. No rash noted.   Normal inspection of the right flank.   Psychiatric: Mood and affect normal.   Nursing note and vitals  reviewed.      LAB RESULTS  Lab Results (last 24 hours)     Procedure Component Value Units Date/Time    Urinalysis With Microscopic If Indicated (No Culture) - Urine, Clean Catch [[id number]]  (Abnormal) Collected:  08/09/19 1611    Specimen:  Urine, Clean Catch Updated:  08/09/19 1635     Color, UA Yellow     Appearance, UA Clear     pH, UA 6.0     Specific Gravity, UA 1.017     Glucose, UA Negative     Ketones, UA Negative     Bilirubin, UA Negative     Blood, UA Small (1+)     Protein, UA Negative     Leuk Esterase, UA Negative     Nitrite, UA Negative     Urobilinogen, UA 0.2 E.U./dL    Urinalysis, Microscopic Only - Urine, Clean Catch [[id number]]  (Abnormal) Collected:  08/09/19 1611    Specimen:  Urine, Clean Catch Updated:  08/09/19 1635     RBC, UA 13-20 /HPF      WBC, UA 0-2 /HPF      Bacteria, UA None Seen /HPF      Squamous Epithelial Cells, UA 0-2 /HPF      Hyaline Casts, UA None Seen /LPF      Methodology Automated Microscopy    CBC & Differential [[id number]] Collected:  08/09/19 1612    Specimen:  Blood Updated:  08/09/19 1627    Narrative:       The following orders were created for panel order CBC & Differential.  Procedure                               Abnormality         Status                     ---------                               -----------         ------                     CBC Auto Differential[[id number]]        Abnormal            Final result                 Please view results for these tests on the individual orders.    Comprehensive Metabolic Panel [[id number]]  (Abnormal) Collected:  08/09/19 1612    Specimen:  Blood Updated:  08/09/19 1651     Glucose 115 mg/dL      BUN 18 mg/dL      Creatinine 0.87 mg/dL      Sodium 141 mmol/L      Potassium 4.2 mmol/L      Chloride 103 mmol/L      CO2 25.5 mmol/L      Calcium 9.1 mg/dL      Total Protein 7.2 g/dL      Albumin 4.60 g/dL      ALT (SGPT) 22 U/L      AST (SGOT) 17 U/L      Alkaline Phosphatase 54 U/L      Total Bilirubin 0.7 mg/dL       eGFR Non African Amer 85 mL/min/1.73      Globulin 2.6 gm/dL      A/G Ratio 1.8 g/dL      BUN/Creatinine Ratio 20.7     Anion Gap 12.5 mmol/L     Narrative:       GFR Normal >60  Chronic Kidney Disease <60  Kidney Failure <15    Lipase [[id number]]  (Normal) Collected:  08/09/19 1612    Specimen:  Blood Updated:  08/09/19 1651     Lipase 19 U/L     Magnesium [[id number]]  (Normal) Collected:  08/09/19 1612    Specimen:  Blood Updated:  08/09/19 1651     Magnesium 2.0 mg/dL     TSH [[id number]]  (Normal) Collected:  08/09/19 1612    Specimen:  Blood Updated:  08/09/19 1658     TSH 1.710 mIU/mL     CBC Auto Differential [[id number]]  (Abnormal) Collected:  08/09/19 1612    Specimen:  Blood Updated:  08/09/19 1627     WBC 6.95 10*3/mm3      RBC 4.85 10*6/mm3      Hemoglobin 14.8 g/dL      Hematocrit 44.3 %      MCV 91.3 fL      MCH 30.5 pg      MCHC 33.4 g/dL      RDW 13.6 %      RDW-SD 45.8 fl      MPV 10.7 fL      Platelets 155 10*3/mm3      Neutrophil % 58.5 %      Lymphocyte % 31.7 %      Monocyte % 7.2 %      Eosinophil % 0.7 %      Basophil % 0.6 %      Immature Grans % 1.3 %      Neutrophils, Absolute 4.07 10*3/mm3      Lymphocytes, Absolute 2.20 10*3/mm3      Monocytes, Absolute 0.50 10*3/mm3      Eosinophils, Absolute 0.05 10*3/mm3      Basophils, Absolute 0.04 10*3/mm3      Immature Grans, Absolute 0.09 10*3/mm3      nRBC 0.0 /100 WBC           I ordered the above labs and reviewed the results    RADIOLOGY  CT Abdomen Pelvis Without Contrast   Preliminary Result   1. Very tiny amount of pericholecystic fluid may be related to very mild   or early cholecystitis, but may also be secondary to passive hepatic   congestion. Please correlate clinically with LFTs and follow-up as   clinically indicated. No definite gallstones are seen and there is no   biliary dilatation.   2. No nephroureterolithiasis or hydronephrosis bilaterally.   3. There is extensive sigmoid diverticulosis without evidence for   diverticulitis.  There is no evidence for appendicitis.   4. Few small pleural plaques at the lung bases containing a few tiny   calcifications suggest asbestos exposure in the past.       Discussed with Dr. Arrington.               I ordered the above noted radiological studies. Interpreted by radiologist. Discussed with radiologist (Dr. Marion Martínez). Reviewed by me in PACS.       PROCEDURES  Procedures      PROGRESS AND CONSULTS  ED Course as of Aug 09 2234   Fri Aug 09, 2019   1710 5:10 PM  Patient's repeat exam was benign and patient has remained pain-free.  I informed him of the CT scan results and the lab work results.  I informed him that he need to follow-up with his primary care doctor concerning a very nonspecific and mild finding of a little thin layer of fluid around his gallbladder.  Patient understood.  Patient agrees with the plan and all questions were answered.  [MM]   1713 I wanted to add that the patient's heart rate has been in the upper 40s to 50s.  It is A. fib on the monitor with a narrow complex.  Patient has a history of A. fib and it is currently what he is in now.  Patient's blood pressure is fine and he is completely asymptomatic.  Patient saw his cardiologist yesterday.  [MM]      ED Course User Index  [MM] Parish Arrington MD     1609 Ordered CT Abd/Pelvis, UA, and blood work for further evaluation.    1658 Received a call from Dr. Marion Martínez (Radiology) who stated the CT Abd/Pelvis shows a tiny layer of fluid around the gallbladder, but there is no gallbladder wall thickening. There is no hydronephrosis or kidney stones. She stated that she does not know the significance of the layer of fluid around the gallbladder.    1700 Rechecked the patient who is resting comfortably and in NAD. Patient is stable. BP- 141/82 HR- 66 Temp- 98 °F (36.7 °C) (Tympanic) O2 sat- 96%. Informed the patient of his labs that show UA RBC of 13-20. Also informed the patient of his CT Abd/Pelvis that shows a tiny layer of fluid  around the gallbladder but no kidney stones or hydronephrosis. Advised the patient that he needs to f/u with his PMD for further evaluation and management of the thin layer of fluid around his gallbladder. Discussed the plan for discharge with instructions to f/u with his PMD and his urologist for further evaluation and management. Strict RTER warnings given. Pt understands and agrees with the plan, all questions answered.        MEDICAL DECISION MAKING  Results were reviewed/discussed with the patient and they were also made aware of online access. Pt also made aware that some labs, such as cultures, will not be resulted during ER visit and follow up with PMD is necessary.     MDM  Number of Diagnoses or Management Options  Coagulopathy from Xarelto:   Fluid around gallbladder:   Hematuria, unspecified type:   Right flank pain:      Amount and/or Complexity of Data Reviewed  Clinical lab tests: ordered and reviewed (UA RBC: 13-20)  Tests in the radiology section of CPT®: ordered and reviewed (CT Abd/Pelvis shows a tiny layer of fluid around the gallbladder, but there is no gallbladder wall thickening. There is no hydronephrosis or kidney stones.)  Discussion of test results with the performing providers: yes (Dr. Marion Martínez (Radiology))  Decide to obtain previous medical records or to obtain history from someone other than the patient: yes (Epic)  Review and summarize past medical records: yes (He was seen at [facility] on 05/30/2019 for left flank pain. He also had some left lower back pain and hematuria. He was diagnosed with left flank pain, left lower back pain, and degenerative disc disease. I read the report of the CT scan. It showed some degenerative changes in the spine but was otherwise unremarkable. His UA showed some RBC but his other lab work was essentially unremarkable.)  Independent visualization of images, tracings, or specimens: yes    Patient Progress  Patient progress: stable          DIAGNOSIS  Final diagnoses:   Right flank pain   Hematuria, unspecified type   Coagulopathy from Xarelto   Fluid around gallbladder       DISPOSITION  DISCHARGE    Patient discharged in stable condition.    Reviewed implications of results, diagnosis, meds, responsibility to follow up, warning signs and symptoms of possible worsening, potential complications and reasons to return to ER, including any new or worsening symptoms.    Patient/Family voiced understanding of above instructions.    Discussed plan for discharge, as there is no emergent indication for admission. Patient referred to primary care provider for BP management due to today's BP. Pt/family is agreeable and understands need for follow up and repeat testing.  Pt is aware that discharge does not mean that nothing is wrong but it indicates no emergency is present that requires admission and they must continue care with follow-up as given below or physician of their choice.     FOLLOW-UP  Luiz Nicolas MD  [address] [facility] [address] [phone]      Call Monday morning for follow-up in the next several days., Return if pain worsens, If symptoms worsen, shortness of breath, fever, any concerns    Maximiliano Mejia MD  [address] [facility] [place] [address] [phone]      Follow-up with Dr. Mejia concerning your blood in the urine., Return if pain worsens, shortness of breath, fever, any concerns         Medication List      No changes were made to your prescriptions during this visit.           Latest Documented Vital Signs:  As of 5:03 PM  BP- 141/82 HR- 66 Temp- 98 °F (36.7 °C) (Tympanic) O2 sat- 96%    --  Documentation assistance provided by stuart Hanna for Dr. Parish Arrington MD.  Information recorded by the scribe was done at my direction and has been verified and validated by me.     Nancy Hanna  08/09/19 [id number]       Parish Arrington MD  08/09/19 0774

## 2019-08-12 LAB
BH CV STRESS BP STAGE 1: NORMAL
BH CV STRESS COMMENTS STAGE 1: NORMAL
BH CV STRESS DOSE REGADENOSON STAGE 1: 0.4
BH CV STRESS DURATION MIN STAGE 1: 2
BH CV STRESS DURATION SEC STAGE 1: 26
BH CV STRESS GRADE STAGE 1: 0
BH CV STRESS HR STAGE 1: 83
BH CV STRESS METS STAGE 1: 1.4
BH CV STRESS PROTOCOL 1: NORMAL
BH CV STRESS RECOVERY BP: NORMAL MMHG
BH CV STRESS RECOVERY HR: 70 BPM
BH CV STRESS RECOVERY O2: 99 %
BH CV STRESS SPEED STAGE 1: 1
BH CV STRESS STAGE 1: 1
LV EF NUC BP: 60 %
MAXIMAL PREDICTED HEART RATE: 142 BPM
PERCENT MAX PREDICTED HR: 58.45 %
STRESS BASELINE BP: NORMAL MMHG
STRESS BASELINE HR: 60 BPM
STRESS O2 SAT REST: 99 %
STRESS PERCENT HR: 69 %
STRESS POST ESTIMATED WORKLOAD: 1.4 METS
STRESS POST EXERCISE DUR MIN: 2 MIN
STRESS POST EXERCISE DUR SEC: 26 SEC
STRESS POST PEAK BP: NORMAL MMHG
STRESS POST PEAK HR: 83 BPM
STRESS TARGET HR: 121 BPM

## 2019-09-03 ENCOUNTER — HOSPITAL ENCOUNTER (EMERGENCY)
Facility: HOSPITAL | Age: 79
Discharge: HOME | End: 2019-09-04
Attending: EMERGENCY MEDICINE | Admitting: EMERGENCY MEDICINE

## 2019-09-03 ENCOUNTER — APPOINTMENT (OUTPATIENT)
Dept: GENERAL RADIOLOGY | Facility: HOSPITAL | Age: 79
End: 2019-09-03

## 2019-09-03 DIAGNOSIS — R07.89 ATYPICAL CHEST PAIN: Primary | ICD-10-CM

## 2019-09-03 LAB
ALBUMIN SERPL-MCNC: 4.4 G/DL (ref 3.5–5.2)
ALBUMIN/GLOB SERPL: 1.6 G/DL
ALP SERPL-CCNC: 46 U/L (ref 39–117)
ALT SERPL W P-5'-P-CCNC: 26 U/L (ref 1–41)
ANION GAP SERPL CALCULATED.3IONS-SCNC: 8.7 MMOL/L (ref 5–15)
AST SERPL-CCNC: 16 U/L (ref 1–40)
BASOPHILS # BLD AUTO: 0.05 10*3/MM3 (ref 0–0.2)
BASOPHILS NFR BLD AUTO: 0.6 % (ref 0–1.5)
BILIRUB SERPL-MCNC: 0.5 MG/DL (ref 0.2–1.2)
BUN BLD-MCNC: 27 MG/DL (ref 8–23)
BUN/CREAT SERPL: 31.4 (ref 7–25)
CALCIUM SPEC-SCNC: 9.3 MG/DL (ref 8.6–10.5)
CHLORIDE SERPL-SCNC: 104 MMOL/L (ref 98–107)
CO2 SERPL-SCNC: 27.3 MMOL/L (ref 22–29)
CREAT BLD-MCNC: 0.86 MG/DL (ref 0.76–1.27)
DEPRECATED RDW RBC AUTO: 46.1 FL (ref 37–54)
EOSINOPHIL # BLD AUTO: 0.05 10*3/MM3 (ref 0–0.4)
EOSINOPHIL NFR BLD AUTO: 0.6 % (ref 0.3–6.2)
ERYTHROCYTE [DISTWIDTH] IN BLOOD BY AUTOMATED COUNT: 13.6 % (ref 12.3–15.4)
GFR SERPL CREATININE-BSD FRML MDRD: 86 ML/MIN/1.73
GLOBULIN UR ELPH-MCNC: 2.8 GM/DL
GLUCOSE BLD-MCNC: 147 MG/DL (ref 65–99)
HCT VFR BLD AUTO: 44.8 % (ref 37.5–51)
HGB BLD-MCNC: 14.7 G/DL (ref 13–17.7)
HOLD SPECIMEN: NORMAL
HOLD SPECIMEN: NORMAL
IMM GRANULOCYTES # BLD AUTO: 0.08 10*3/MM3 (ref 0–0.05)
IMM GRANULOCYTES NFR BLD AUTO: 0.9 % (ref 0–0.5)
LYMPHOCYTES # BLD AUTO: 2.01 10*3/MM3 (ref 0.7–3.1)
LYMPHOCYTES NFR BLD AUTO: 22.6 % (ref 19.6–45.3)
MCH RBC QN AUTO: 30.4 PG (ref 26.6–33)
MCHC RBC AUTO-ENTMCNC: 32.8 G/DL (ref 31.5–35.7)
MCV RBC AUTO: 92.8 FL (ref 79–97)
MONOCYTES # BLD AUTO: 0.56 10*3/MM3 (ref 0.1–0.9)
MONOCYTES NFR BLD AUTO: 6.3 % (ref 5–12)
NEUTROPHILS # BLD AUTO: 6.15 10*3/MM3 (ref 1.7–7)
NEUTROPHILS NFR BLD AUTO: 69 % (ref 42.7–76)
NRBC BLD AUTO-RTO: 0 /100 WBC (ref 0–0.2)
PLATELET # BLD AUTO: 167 10*3/MM3 (ref 140–450)
PMV BLD AUTO: 10.8 FL (ref 6–12)
POTASSIUM BLD-SCNC: 4.8 MMOL/L (ref 3.5–5.2)
PROT SERPL-MCNC: 7.2 G/DL (ref 6–8.5)
RBC # BLD AUTO: 4.83 10*6/MM3 (ref 4.14–5.8)
SODIUM BLD-SCNC: 140 MMOL/L (ref 136–145)
TROPONIN T SERPL-MCNC: <0.01 NG/ML (ref 0–0.03)
TROPONIN T SERPL-MCNC: <0.01 NG/ML (ref 0–0.03)
WBC NRBC COR # BLD: 8.9 10*3/MM3 (ref 3.4–10.8)
WHOLE BLOOD HOLD SPECIMEN: NORMAL
WHOLE BLOOD HOLD SPECIMEN: NORMAL

## 2019-09-03 PROCEDURE — 71046 X-RAY EXAM CHEST 2 VIEWS: CPT

## 2019-09-03 PROCEDURE — 80053 COMPREHEN METABOLIC PANEL: CPT

## 2019-09-03 PROCEDURE — 93005 ELECTROCARDIOGRAM TRACING: CPT

## 2019-09-03 PROCEDURE — 93010 ELECTROCARDIOGRAM REPORT: CPT | Performed by: INTERNAL MEDICINE

## 2019-09-03 PROCEDURE — 84484 ASSAY OF TROPONIN QUANT: CPT

## 2019-09-03 PROCEDURE — 99284 EMERGENCY DEPT VISIT MOD MDM: CPT

## 2019-09-03 PROCEDURE — 93005 ELECTROCARDIOGRAM TRACING: CPT | Performed by: EMERGENCY MEDICINE

## 2019-09-03 PROCEDURE — 85025 COMPLETE CBC W/AUTO DIFF WBC: CPT

## 2019-09-03 PROCEDURE — 84484 ASSAY OF TROPONIN QUANT: CPT | Performed by: EMERGENCY MEDICINE

## 2019-09-03 RX ORDER — ASPIRIN 325 MG
325 TABLET ORAL ONCE
Status: DISCONTINUED | OUTPATIENT
Start: 2019-09-03 | End: 2019-09-04 | Stop reason: HOSPADM

## 2019-09-03 RX ORDER — SODIUM CHLORIDE 0.9 % (FLUSH) 0.9 %
10 SYRINGE (ML) INJECTION AS NEEDED
Status: DISCONTINUED | OUTPATIENT
Start: 2019-09-03 | End: 2019-09-04 | Stop reason: HOSPADM

## 2019-09-03 NOTE — ED NOTES
"Pt states \"I started having CP that began at 4:45pm, took 3 SL nitro and it got a lot better.\" Pt currently denies CP or associated symptoms.     Veronica Becerra, RN  09/03/19 1731    "

## 2019-09-04 VITALS
OXYGEN SATURATION: 97 % | HEIGHT: 72 IN | SYSTOLIC BLOOD PRESSURE: 132 MMHG | RESPIRATION RATE: 18 BRPM | TEMPERATURE: 98.3 F | WEIGHT: 210.4 LBS | HEART RATE: 50 BPM | BODY MASS INDEX: 28.5 KG/M2 | DIASTOLIC BLOOD PRESSURE: 74 MMHG

## 2019-09-04 NOTE — ED NOTES
"Pt reports midsternal chest pain which began 1630 today. States \"It was a tightness and I felt sick, like I'd throw up.\" Pt reports he took 3 SL NG tablets, states \"that helped. It finally passed. I'm doing okay right now.\" Pt is A&O x4, ambulated into exam room unassisted and w/o difficulty.      Brianne Rubio RN  09/03/19 2909    "

## 2019-09-04 NOTE — ED PROVIDER NOTES
EMERGENCY DEPARTMENT ENCOUNTER    Room Number:  17/17  Date of encounter:  9/4/2019  PCP: Luiz Nicolas MD  Historian: Patient      HPI:  Chief Complaint: Chest pain  A complete HPI/ROS/PMH/PSH/SH/FH are limited due to: Nothing    Context: Doc Coyne is a 79 y.o. male who presents to the ED c/o chest pain that started around 4:30 PM when he was coming in from smoking outside.  He reports some moderate discomfort in the area of the xiphoid that he says radiated through to his back.  It lasted just a couple of minutes and went away.  He was not short of breath.  He did not have any associated nausea, vomiting, diaphoresis or other symptoms.  The discomfort did not radiate anywhere.  It completely resolved spontaneously and has not recurred.      PAST MEDICAL HISTORY  Active Ambulatory Problems     Diagnosis Date Noted   • Atrial fibrillation (CMS/HCC) 02/08/2016   • Chest pain 02/08/2016   • Shortness of breath 02/08/2016   • Preop cardiovascular exam 02/08/2016   • Anticoagulated 05/14/2018     Resolved Ambulatory Problems     Diagnosis Date Noted   • No Resolved Ambulatory Problems     Past Medical History:   Diagnosis Date   • Atherosclerotic heart disease of native coronary artery without angina pectoris    • Atrial fibrillation (CMS/HCC)    • Benign prostatic hypertrophy    • Chest pain    • COPD (chronic obstructive pulmonary disease) (CMS/HCC)    • Diabetes mellitus (CMS/HCC)    • Hypertension    • Prostate cancer (CMS/HCC)          PAST SURGICAL HISTORY  Past Surgical History:   Procedure Laterality Date   • COLONOSCOPY     • HEMORRHOIDECTOMY     • PROSTATE SURGERY           FAMILY HISTORY  Family History   Problem Relation Age of Onset   • Hypertension Father    • Heart failure Father    • Hyperlipidemia Father    • Heart disease Father    • Heart attack Father    • Heart attack Sister    • Stroke Mother          SOCIAL HISTORY  Social History     Socioeconomic History   • Marital status:       Spouse name: Not on file   • Number of children: Not on file   • Years of education: Not on file   • Highest education level: Not on file   Tobacco Use   • Smoking status: Current Every Day Smoker     Packs/day: 0.50   • Smokeless tobacco: Never Used   Substance and Sexual Activity   • Alcohol use: Yes     Comment: OCCASIONALLY   • Drug use: No   • Sexual activity: Defer         ALLERGIES  Isosorbide nitrate; Novocain  [procaine]; Penicillins; Propoxyphene; Cephalexin; Doxycycline; and Sulfa antibiotics        REVIEW OF SYSTEMS  Review of Systems   Constitutional: Negative for activity change, appetite change and fever.   HENT: Negative for congestion and sore throat.    Eyes: Positive for visual disturbance (intermittent flashes of light in the periphery of vision, off and on for at least a month.  None right now.  ).   Respiratory: Negative for cough and shortness of breath.    Cardiovascular: Positive for chest pain. Negative for leg swelling.   Gastrointestinal: Negative for abdominal pain, diarrhea and vomiting.   Endocrine: Negative.    Genitourinary: Negative for decreased urine volume and dysuria.   Musculoskeletal: Negative for neck pain.   Skin: Negative for rash and wound.   Allergic/Immunologic: Negative.    Neurological: Negative for weakness, numbness and headaches.   Hematological: Negative.    Psychiatric/Behavioral: Negative.    All other systems reviewed and are negative.           PHYSICAL EXAM      GENERAL: 79-year-old white male well developed, well nourished in no apparent distress  HENT: NCAT, neck supple, trachea midline  EYES: no scleral icterus, PERRL, normal conjunctiva  CV: Irregularly irregular.2/6 systolic murmur.  RESPIRATORY: unlabored effort, there is some faint expiratory wheezing bilaterally  ABDOMEN: soft, non-tender, non-distended  MUSCULOSKELETAL: no gross deformity, mild bilateral lower extremity edema  NEURO: alert, CN II-XII grossly intact, sensory and motor  function of extremities grossly intact.  SKIN: warm, dry, no rash  PSYCH:  Appropriate mood and affect    Vital signs and nursing notes reviewed.          LAB RESULTS  Recent Results (from the past 24 hour(s))   Comprehensive Metabolic Panel    Collection Time: 09/03/19  8:26 PM   Result Value Ref Range    Glucose 147 (H) 65 - 99 mg/dL    BUN 27 (H) 8 - 23 mg/dL    Creatinine 0.86 0.76 - 1.27 mg/dL    Sodium 140 136 - 145 mmol/L    Potassium 4.8 3.5 - 5.2 mmol/L    Chloride 104 98 - 107 mmol/L    CO2 27.3 22.0 - 29.0 mmol/L    Calcium 9.3 8.6 - 10.5 mg/dL    Total Protein 7.2 6.0 - 8.5 g/dL    Albumin 4.40 3.50 - 5.20 g/dL    ALT (SGPT) 26 1 - 41 U/L    AST (SGOT) 16 1 - 40 U/L    Alkaline Phosphatase 46 39 - 117 U/L    Total Bilirubin 0.5 0.2 - 1.2 mg/dL    eGFR Non African Amer 86 >60 mL/min/1.73    Globulin 2.8 gm/dL    A/G Ratio 1.6 g/dL    BUN/Creatinine Ratio 31.4 (H) 7.0 - 25.0    Anion Gap 8.7 5.0 - 15.0 mmol/L   Troponin    Collection Time: 09/03/19  8:26 PM   Result Value Ref Range    Troponin T <0.010 0.000 - 0.030 ng/mL   Light Blue Top    Collection Time: 09/03/19  8:26 PM   Result Value Ref Range    Extra Tube hold for add-on    Green Top (Gel)    Collection Time: 09/03/19  8:26 PM   Result Value Ref Range    Extra Tube Hold for add-ons.    Lavender Top    Collection Time: 09/03/19  8:26 PM   Result Value Ref Range    Extra Tube hold for add-on    Gold Top - SST    Collection Time: 09/03/19  8:26 PM   Result Value Ref Range    Extra Tube Hold for add-ons.    CBC Auto Differential    Collection Time: 09/03/19  8:26 PM   Result Value Ref Range    WBC 8.90 3.40 - 10.80 10*3/mm3    RBC 4.83 4.14 - 5.80 10*6/mm3    Hemoglobin 14.7 13.0 - 17.7 g/dL    Hematocrit 44.8 37.5 - 51.0 %    MCV 92.8 79.0 - 97.0 fL    MCH 30.4 26.6 - 33.0 pg    MCHC 32.8 31.5 - 35.7 g/dL    RDW 13.6 12.3 - 15.4 %    RDW-SD 46.1 37.0 - 54.0 fl    MPV 10.8 6.0 - 12.0 fL    Platelets 167 140 - 450 10*3/mm3    Neutrophil % 69.0 42.7 -  76.0 %    Lymphocyte % 22.6 19.6 - 45.3 %    Monocyte % 6.3 5.0 - 12.0 %    Eosinophil % 0.6 0.3 - 6.2 %    Basophil % 0.6 0.0 - 1.5 %    Immature Grans % 0.9 (H) 0.0 - 0.5 %    Neutrophils, Absolute 6.15 1.70 - 7.00 10*3/mm3    Lymphocytes, Absolute 2.01 0.70 - 3.10 10*3/mm3    Monocytes, Absolute 0.56 0.10 - 0.90 10*3/mm3    Eosinophils, Absolute 0.05 0.00 - 0.40 10*3/mm3    Basophils, Absolute 0.05 0.00 - 0.20 10*3/mm3    Immature Grans, Absolute 0.08 (H) 0.00 - 0.05 10*3/mm3    nRBC 0.0 0.0 - 0.2 /100 WBC   Troponin    Collection Time: 09/03/19 11:22 PM   Result Value Ref Range    Troponin T <0.010 0.000 - 0.030 ng/mL       Ordered the above labs and reviewed the results.        RADIOLOGY  Xr Chest 2 View    Result Date: 9/3/2019  PA AND LATERAL CHEST X-RAY  CLINICAL HISTORY: Chest Pain Triage Protocol  COMPARISON: None.  FINDINGS: PA and lateral views of the chest were obtained. The lungs are well inflated. There are a few scattered subcentimeter nodular densities bilaterally. These are favored to be infectious or inflammatory in nature but there are no prior studies, at least at this institution, for comparison purposes to document stability. Initial recommendation would be to obtain any prior outside studies in an attempt to document stability. Otherwise continued surveillance will be needed to exclude neoplastic process.. Heart size and pulmonary vascularity are normal. No pleural effusions.  Mild multilevel spurring in the mid and lower thoracic spine.        1. Faint nodularity as discussed, no active air space disease process. however..         I ordered the above noted radiological studies. Interpreted by radiologist. Images independently reviewed by me in PACS.    PROCEDURES  Procedures    EKG:           EKG time: 1851  Rhythm/Rate: Atrial fibrillation 66  P waves and VA: None  QRS, axis: Normal  ST and T waves: Normal    Interpreted Contemporaneously by me, independently viewed  Comparison: No change  from 4/20/2016        MEDICATIONS GIVEN IN ER  Medications - No data to display    PROGRESS AND CONSULTS  ED Course as of Sep 04 0338   Wed Sep 04, 2019   0057 Patient has had 2 normal troponins here.  Normal stress test last summer and clean cath in 2016.  He sees Dr. Rosado for atrial fibrillation.  Pain is pretty atypical.  He would like to go home and follow-up with Dr. Rosado in the office.  [EB]      ED Course User Index  [EB] Mitch Ledesma MD         Final Diagnosis: as of Sep 04 0338   Atypical chest pain           MEDICAL DECISION MAKING  Review of old medical records -here on August 9 for some right flank pain.  Did have some hematuria then but evaluation was otherwise unremarkable.  He sees Dr. Rosado and had a heart cath in 2016 showing relatively clean coronaries.  He had a normal stress test in June 2018.    MDM           DIAGNOSIS  Final diagnoses:   Atypical chest pain         DISPOSITION  DISCHARGE    Patient discharged in stable condition.    Reviewed implications of results, diagnosis, meds, responsibility to follow up, warning signs and symptoms of possible worsening, potential complications and reasons to return to ER, including increasing pain, shortness of breath, fever or other concerns.    Patient/Family voiced understanding of above instructions.    Discussed plan for discharge, as there is no emergent indication for admission. Patient referred to primary care provider for BP management due to today's BP. Pt/family is agreeable and understands need for follow up and repeat testing.  Pt is aware that discharge does not mean that nothing is wrong but it indicates no emergency is present that requires admission and they must continue care with follow-up as given below or physician of their choice.     FOLLOW-UP  Joel Rosado MD  [address] [facility] [address] [phone]    Schedule an appointment as soon as possible for a visit            Medication  List      No changes were made to your prescriptions during this visit.         No Mitch Nettles MD  09/04/19 [id number]

## 2019-09-06 RX ORDER — RIVAROXABAN 20 MG/1
TABLET, FILM COATED ORAL
Qty: 30 TABLET | Refills: 0 | Status: SHIPPED | OUTPATIENT
Start: 2019-09-06 | End: 2020-11-05 | Stop reason: SDUPTHER

## 2019-09-25 ENCOUNTER — TELEPHONE (OUTPATIENT)
Dept: CARDIOLOGY | Facility: CLINIC | Age: 79
End: 2019-09-25

## 2019-09-25 ENCOUNTER — OFFICE VISIT (OUTPATIENT)
Dept: CARDIOLOGY | Facility: CLINIC | Age: 79
End: 2019-09-25

## 2019-09-25 VITALS
BODY MASS INDEX: 28.99 KG/M2 | HEIGHT: 72 IN | DIASTOLIC BLOOD PRESSURE: 79 MMHG | SYSTOLIC BLOOD PRESSURE: 140 MMHG | WEIGHT: 214 LBS | HEART RATE: 57 BPM

## 2019-09-25 DIAGNOSIS — I48.0 PAROXYSMAL ATRIAL FIBRILLATION (HCC): ICD-10-CM

## 2019-09-25 DIAGNOSIS — F17.200 SMOKER: ICD-10-CM

## 2019-09-25 DIAGNOSIS — R07.89 CHEST PAIN, ATYPICAL: Primary | ICD-10-CM

## 2019-09-25 DIAGNOSIS — I10 ESSENTIAL HYPERTENSION: ICD-10-CM

## 2019-09-25 DIAGNOSIS — E78.5 HYPERLIPIDEMIA, UNSPECIFIED HYPERLIPIDEMIA TYPE: ICD-10-CM

## 2019-09-25 DIAGNOSIS — I25.118 CORONARY ARTERY DISEASE OF NATIVE ARTERY OF NATIVE HEART WITH STABLE ANGINA PECTORIS (HCC): ICD-10-CM

## 2019-09-25 DIAGNOSIS — Z79.01 ANTICOAGULATED: ICD-10-CM

## 2019-09-25 PROCEDURE — 93000 ELECTROCARDIOGRAM COMPLETE: CPT | Performed by: NURSE PRACTITIONER

## 2019-09-25 PROCEDURE — 99214 OFFICE O/P EST MOD 30 MIN: CPT | Performed by: NURSE PRACTITIONER

## 2019-09-25 NOTE — PROGRESS NOTES
Subjective:        Doc Coyne is a 79 y.o. male who here for follow up    Chief Complaint   Patient presents with   • Follow-up     ER FOLLOW UP       HPI  Doc Coyne is a 79-year-old male, who is new to this provider.  He has a history of CAD, atrial fibrillation, anticoagulation, BPH, COPD, diabetes mellitus, hypertension, and prostate cancer.  He recently went to the ER for chest pain, it was not associated with nausea, vomiting, diaphoresis.  EKG shows atrial fibrillation heart rate 66.  At that time he had 2 normal troponins and it was decided his pain was atypical.  He was discharged in stable condition at that time.  In the office his ECG shows atrial fibrillation, rate 60.  Currently takes Xarelto, ACE, and beta-blockade.      Denies chest pain, shortness of breath, cough, syncope or near syncope    stress test 6/7/2018 showed normal myocardial perfusion study with no evidence of ischemia.  Echo on 6/2018 showed EF 51%, mild PV regurgitation, calcification of AV, right ventricular cavity is mildly dilated, mild to moderate tricuspid valve regurgitation is present, LAC size is mild to moderately dilated, mild LV regurgitation is present, EF 51%, no evidence of pericardial effusion.    Cardiac cath 2016 showed left main was normal, bifurcated into l LAD, LAD shows early atherosclerotic plaque only, including the diagonal and peripheral branches, circumflex artery was nondominant and free of any atherosclerotic narrowing.  RCA was dominant with earlyatherosclerotic  narrowing, LV showed mild LV dysfunction.    The following portions of the patient's history were reviewed and updated as appropriate: allergies, current medications, past family history, past medical history, past social history, past surgical history and problem list.    Past Medical History:   Diagnosis Date   • Atherosclerotic heart disease of native coronary artery without angina pectoris    • Atrial fibrillation (CMS/[facility])    •  Benign prostatic hypertrophy    • Chest pain    • COPD (chronic obstructive pulmonary disease) (CMS/HCC)    • Diabetes mellitus (CMS/HCC)    • Hypertension    • Prostate cancer (CMS/HCC)          reports that he has been smoking cigarettes.  He has been smoking about 0.50 packs per day. He has never used smokeless tobacco. He reports that he drinks alcohol. He reports that he does not use drugs.     Family History   Problem Relation Age of Onset   • Hypertension Father    • Heart failure Father    • Hyperlipidemia Father    • Heart disease Father    • Heart attack Father    • Heart attack Sister    • Stroke Mother        ROS     Review of Systems  Constitutional: No weight loss, fever, fatigue   gastrointestinal: No nausea, abdominal pain  Behavioral/Psych: No insomnia or anxiety   Cardiovascular: denies cp, shortness of breath, and palpitations.      Objective:           Physical Exam   Constitutional: He is oriented to person, place, and time. He appears well-developed and well-nourished.   HENT:   Head: Normocephalic.   Right Ear: External ear normal.   Left Ear: External ear normal.   Eyes: EOM are normal.   Neck: Normal range of motion. No JVD present.   Cardiovascular: Normal rate, regular rhythm, normal heart sounds and intact distal pulses. Exam reveals no gallop and no friction rub.   No murmur heard.  Pulmonary/Chest: Effort normal and breath sounds normal. No stridor. No respiratory distress. He has no rales.   Abdominal: Soft. Bowel sounds are normal. He exhibits no distension. There is no tenderness. There is no guarding.   Musculoskeletal: Normal range of motion. He exhibits no edema or tenderness.   Neurological: He is alert and oriented to person, place, and time. He has normal reflexes.   Skin: Skin is warm.   Psychiatric: He has a normal mood and affect. Judgment normal.   Nursing note and vitals reviewed.        ECG 12 Lead  Date/Time: 9/25/2019 9:45 AM  Performed by: Hawa Saravia  NELSON  Authorized by: Hawa Saravia APRN   Comparison: compared with previous ECG from 7/26/2019  Rhythm: atrial fibrillation  Rate: normal           Interpretation Summary        · Findings consistent with a normal ECG stress test.  · Left ventricular ejection fraction is normal (Calculated EF = 60%).  · Myocardial perfusion imaging indicates a normal myocardial perfusion study with no evidence of ischemia.  · Impressions are consistent with a low risk study.            Interpretation Summary     · Mild pulmonic valve regurgitation is present.  · There is calcification of the aortic valve.  · Right ventricular cavity is mildly dilated.  · Mild to moderate tricuspid valve regurgitation is present.  · Left atrial cavity size is mild-to-moderately dilated.  · Mild mitral valve regurgitation is present  · Calculated EF = 51%  · There is no evidence of pericardial effusion.        CORONARY ANGIOGRAM:   1.  The left main was normal, bifurcated into left anterior descending and  circumflex in a normal fashion.   2.  Left anterior descending shows early atherosclerotic plaque only, including  the diagonal and  branches.   3.  Circumflex artery was nondominant and free of any atherosclerotic  narrowing.   4.  The right coronary artery was dominant with early atherosclerotic  narrowing.   5.  Left ventriculogram showed mild LV dysfunction.      FINAL CONCLUSIONS:   1.  Early atherosclerotic plaque.   2.  Mild LV dysfunction.      DISCUSSION:  Considering the patient's early atherosclerotic plaque with mild  LV dysfunction, the patient will be treated medically.            Current Outpatient Medications:   •  CARTIA  MG 24 hr capsule, TAKE 1 CAPSULE BY MOUTH DAILY, Disp: 90 capsule, Rfl: 5  •  lisinopril (PRINIVIL,ZESTRIL) 30 MG tablet, Take 30 mg by mouth Daily., Disp: , Rfl: 2  •  metFORMIN (GLUCOPHAGE) 500 MG tablet, Take 1 tablet by mouth every 12 (twelve) hours. TAKE 1 TABLET BY MOUTH EVERY 12 HOURS  WITH FOOD, Disp: , Rfl:   •  metoprolol succinate XL (TOPROL-XL) 50 MG 24 hr tablet, Take 1 tablet by mouth daily., Disp: , Rfl:   •  XARELTO 20 MG tablet, TAKE 1 TABLET BY MOUTH DAILY, Disp: 30 tablet, Rfl: 0  •  NITROSTAT 0.4 MG SL tablet, Place 0.4 mg under the tongue Every 5 (Five) Minutes As Needed., Disp: , Rfl: 1     Assessment:        Patient Active Problem List   Diagnosis   • Atrial fibrillation (CMS/HCC)   • Chest pain   • Shortness of breath   • Preop cardiovascular exam   • Anticoagulated               Plan:   1. Chest pain: None currently.  He went to the ED on 8/12 2019 for chest pain and it was decided he had atypical chest pain and was released in stable condition.  His last stress test as above.  Denies chest pain at this time.  At this time we will do an echo     2.  Atrial fibrillation: ECG showed atrial fib, rate 60.  Anticoagulated on Xarelto.  He states he does not have any issues taking his medication.      3.  Anticoagulation: Currently on Xarelto.    Pros and cons as well as indication of the anticoagulation has been explained to the patient in detail. There are no obvious complications at this stage. Risk of  the bleedings has been explained. Need for the regular blood workup and adjust the dose has been explained. Need for proper follow-up on anticoagulation also has been explained.     4.  Smoker: He states he wants to quit.    Doc Coyne has been explained that tobacco abuse is extremely harmful to the body including to the cardiovascular, it significantly increases the risk of atherosclerosis, myocardial infarction, strokes and peripheral vascular disease. Strongly advised to stop tobacco abuse. Secondhand smoking also has been explained.    5.CAD: Denies chest pain currently.  Last cardiac cath as above.    Risk reduction for the coronary artery disease, controlling the blood pressure, blood sugar management, cholesterol management, exercise, stress management, and proper  compliance with medications and follow-up has been discussed    6.  Hypertension blood pressure in the office today is controlled on current medications.  Continue medications.    Educated patient on exercising for at least 30 minutes a day for 2 to 3 days a week. Importance of controlling hypertension and blood pressure checkup on the regular basis has been explained. Hypertension as a silent killer has been discussed. Risk reduction of the weight and regular exercises to control the hypertension has been explained.    7. Hyperlipidemia:  Last lipid panel showed , HDL 39, LDL 87, and trig. 122.  Current medications.    Risk of the hyperlipidemia, importance of the treatment has been explained. Pros and cons of the statins has been explained. Regular blood workup as well as side effects including the liver failure, myelopathy death has been explained.      No diagnosis found.    There are no diagnoses linked to this encounter.    COUNSELING:    Doc Islas was given to patient for the following topics: diagnostic results, risk factor reductions, impressions, risks and benefits of treatment options and importance of treatment compliance .       SMOKING COUNSELING: Counseling done  We will do an echo and have him follow-up with Dr. Rosado.    Sincerely,   NELSON Aquino  [facility]  09/25/19  9:41 AM

## 2019-09-25 NOTE — TELEPHONE ENCOUNTER
PATIENT HAVING LASER E[facility]  SURGERY ON 10/4/19   PATIENT IS ASKING HOW MANY DAYS PRIOR TO HOLD HIS XARELTO

## 2019-10-23 ENCOUNTER — APPOINTMENT (OUTPATIENT)
Dept: CARDIOLOGY | Facility: HOSPITAL | Age: 79
End: 2019-10-23

## 2019-10-26 ENCOUNTER — HOSPITAL ENCOUNTER (EMERGENCY)
Facility: HOSPITAL | Age: 79
Discharge: HOME | End: 2019-10-26
Attending: EMERGENCY MEDICINE | Admitting: EMERGENCY MEDICINE

## 2019-10-26 VITALS
TEMPERATURE: 97.4 F | RESPIRATION RATE: 16 BRPM | BODY MASS INDEX: 28.71 KG/M2 | HEART RATE: 62 BPM | OXYGEN SATURATION: 98 % | DIASTOLIC BLOOD PRESSURE: 65 MMHG | SYSTOLIC BLOOD PRESSURE: 99 MMHG | WEIGHT: 212 LBS | HEIGHT: 72 IN

## 2019-10-26 DIAGNOSIS — M79.641 RIGHT HAND PAIN: Primary | ICD-10-CM

## 2019-10-26 DIAGNOSIS — B37.81 CANDIDIASIS OF MOUTH AND ESOPHAGUS (HCC): ICD-10-CM

## 2019-10-26 DIAGNOSIS — T23.031A BURN OF MULTIPLE FINGERS OF RIGHT HAND EXCLUDING THUMB, UNSPECIFIED BURN DEGREE, INITIAL ENCOUNTER: ICD-10-CM

## 2019-10-26 DIAGNOSIS — B37.0 CANDIDIASIS OF MOUTH AND ESOPHAGUS (HCC): ICD-10-CM

## 2019-10-26 PROCEDURE — 99283 EMERGENCY DEPT VISIT LOW MDM: CPT

## 2019-12-24 ENCOUNTER — APPOINTMENT (OUTPATIENT)
Dept: GENERAL RADIOLOGY | Facility: HOSPITAL | Age: 79
End: 2019-12-24

## 2019-12-24 ENCOUNTER — HOSPITAL ENCOUNTER (OUTPATIENT)
Facility: HOSPITAL | Age: 79
Setting detail: OBSERVATION
Discharge: HOME | End: 2019-12-25
Attending: HOSPITALIST | Admitting: INTERNAL MEDICINE

## 2019-12-24 PROBLEM — R00.1 BRADYCARDIA: Status: ACTIVE | Noted: 2019-12-24

## 2019-12-24 PROBLEM — I25.10 ATHEROSCLEROTIC HEART DISEASE OF NATIVE CORONARY ARTERY WITHOUT ANGINA PECTORIS: Status: ACTIVE | Noted: 2019-12-24

## 2019-12-24 PROBLEM — N40.0 BENIGN PROSTATIC HYPERTROPHY: Status: ACTIVE | Noted: 2019-12-24

## 2019-12-24 PROBLEM — E11.9 TYPE 2 DIABETES MELLITUS, WITHOUT LONG-TERM CURRENT USE OF INSULIN (HCC): Status: ACTIVE | Noted: 2019-12-24

## 2019-12-24 LAB
ALBUMIN SERPL-MCNC: 4.1 G/DL (ref 3.5–5.2)
ALBUMIN/GLOB SERPL: 1.3 G/DL
ALP SERPL-CCNC: 55 U/L (ref 39–117)
ALT SERPL W P-5'-P-CCNC: 16 U/L (ref 1–41)
ANION GAP SERPL CALCULATED.3IONS-SCNC: 11.8 MMOL/L (ref 5–15)
AST SERPL-CCNC: 16 U/L (ref 1–40)
BASOPHILS # BLD AUTO: 0.03 10*3/MM3 (ref 0–0.2)
BASOPHILS NFR BLD AUTO: 0.5 % (ref 0–1.5)
BILIRUB SERPL-MCNC: 0.7 MG/DL (ref 0.2–1.2)
BUN BLD-MCNC: 25 MG/DL (ref 8–23)
BUN/CREAT SERPL: 27.5 (ref 7–25)
CALCIUM SPEC-SCNC: 9.3 MG/DL (ref 8.6–10.5)
CHLORIDE SERPL-SCNC: 101 MMOL/L (ref 98–107)
CO2 SERPL-SCNC: 26.2 MMOL/L (ref 22–29)
CREAT BLD-MCNC: 0.91 MG/DL (ref 0.76–1.27)
DEPRECATED RDW RBC AUTO: 43.3 FL (ref 37–54)
EOSINOPHIL # BLD AUTO: 0.05 10*3/MM3 (ref 0–0.4)
EOSINOPHIL NFR BLD AUTO: 0.8 % (ref 0.3–6.2)
ERYTHROCYTE [DISTWIDTH] IN BLOOD BY AUTOMATED COUNT: 12.7 % (ref 12.3–15.4)
GFR SERPL CREATININE-BSD FRML MDRD: 81 ML/MIN/1.73
GLOBULIN UR ELPH-MCNC: 3.2 GM/DL
GLUCOSE BLD-MCNC: 123 MG/DL (ref 65–99)
GLUCOSE BLDC GLUCOMTR-MCNC: 114 MG/DL (ref 70–130)
GLUCOSE BLDC GLUCOMTR-MCNC: 138 MG/DL (ref 70–130)
GLUCOSE BLDC GLUCOMTR-MCNC: 173 MG/DL (ref 70–130)
HCT VFR BLD AUTO: 42.8 % (ref 37.5–51)
HGB BLD-MCNC: 14.1 G/DL (ref 13–17.7)
IMM GRANULOCYTES # BLD AUTO: 0.08 10*3/MM3 (ref 0–0.05)
IMM GRANULOCYTES NFR BLD AUTO: 1.2 % (ref 0–0.5)
INR PPP: 3.4 (ref 0.9–1.1)
LIPASE SERPL-CCNC: 17 U/L (ref 13–60)
LYMPHOCYTES # BLD AUTO: 1.62 10*3/MM3 (ref 0.7–3.1)
LYMPHOCYTES NFR BLD AUTO: 25.3 % (ref 19.6–45.3)
MCH RBC QN AUTO: 30.9 PG (ref 26.6–33)
MCHC RBC AUTO-ENTMCNC: 32.9 G/DL (ref 31.5–35.7)
MCV RBC AUTO: 93.7 FL (ref 79–97)
MONOCYTES # BLD AUTO: 0.54 10*3/MM3 (ref 0.1–0.9)
MONOCYTES NFR BLD AUTO: 8.4 % (ref 5–12)
NEUTROPHILS # BLD AUTO: 4.09 10*3/MM3 (ref 1.7–7)
NEUTROPHILS NFR BLD AUTO: 63.8 % (ref 42.7–76)
NRBC BLD AUTO-RTO: 0 /100 WBC (ref 0–0.2)
NT-PROBNP SERPL-MCNC: 673.2 PG/ML (ref 5–1800)
PLATELET # BLD AUTO: 184 10*3/MM3 (ref 140–450)
PMV BLD AUTO: 9.6 FL (ref 6–12)
POTASSIUM BLD-SCNC: 4.6 MMOL/L (ref 3.5–5.2)
PROT SERPL-MCNC: 7.3 G/DL (ref 6–8.5)
PROTHROMBIN TIME: 34.1 SECONDS (ref 11.7–14.2)
RBC # BLD AUTO: 4.57 10*6/MM3 (ref 4.14–5.8)
SODIUM BLD-SCNC: 139 MMOL/L (ref 136–145)
TROPONIN T SERPL-MCNC: <0.01 NG/ML (ref 0–0.03)
TROPONIN T SERPL-MCNC: <0.01 NG/ML (ref 0–0.03)
WBC NRBC COR # BLD: 6.41 10*3/MM3 (ref 3.4–10.8)

## 2019-12-24 PROCEDURE — 82962 GLUCOSE BLOOD TEST: CPT

## 2019-12-24 PROCEDURE — 80053 COMPREHEN METABOLIC PANEL: CPT | Performed by: NURSE PRACTITIONER

## 2019-12-24 PROCEDURE — G0378 HOSPITAL OBSERVATION PER HR: HCPCS

## 2019-12-24 PROCEDURE — 85025 COMPLETE CBC W/AUTO DIFF WBC: CPT | Performed by: NURSE PRACTITIONER

## 2019-12-24 PROCEDURE — 85610 PROTHROMBIN TIME: CPT | Performed by: NURSE PRACTITIONER

## 2019-12-24 PROCEDURE — G0379 DIRECT REFER HOSPITAL OBSERV: HCPCS

## 2019-12-24 PROCEDURE — 83880 ASSAY OF NATRIURETIC PEPTIDE: CPT | Performed by: NURSE PRACTITIONER

## 2019-12-24 PROCEDURE — 84484 ASSAY OF TROPONIN QUANT: CPT | Performed by: NURSE PRACTITIONER

## 2019-12-24 PROCEDURE — 63710000001 INSULIN LISPRO (HUMAN) PER 5 UNITS: Performed by: NURSE PRACTITIONER

## 2019-12-24 PROCEDURE — 99213 OFFICE O/P EST LOW 20 MIN: CPT | Performed by: INTERNAL MEDICINE

## 2019-12-24 PROCEDURE — 74220 X-RAY XM ESOPHAGUS 1CNTRST: CPT

## 2019-12-24 PROCEDURE — 71046 X-RAY EXAM CHEST 2 VIEWS: CPT

## 2019-12-24 PROCEDURE — 83690 ASSAY OF LIPASE: CPT | Performed by: NURSE PRACTITIONER

## 2019-12-24 PROCEDURE — 96374 THER/PROPH/DIAG INJ IV PUSH: CPT

## 2019-12-24 RX ORDER — DEXTROSE MONOHYDRATE 25 G/50ML
25 INJECTION, SOLUTION INTRAVENOUS
Status: DISCONTINUED | OUTPATIENT
Start: 2019-12-24 | End: 2019-12-25 | Stop reason: HOSPADM

## 2019-12-24 RX ORDER — PANTOPRAZOLE SODIUM 40 MG/10ML
40 INJECTION, POWDER, LYOPHILIZED, FOR SOLUTION INTRAVENOUS
Status: DISCONTINUED | OUTPATIENT
Start: 2019-12-24 | End: 2019-12-25

## 2019-12-24 RX ORDER — DILTIAZEM HYDROCHLORIDE 120 MG/1
120 CAPSULE, COATED, EXTENDED RELEASE ORAL DAILY
Status: DISCONTINUED | OUTPATIENT
Start: 2019-12-24 | End: 2019-12-25 | Stop reason: HOSPADM

## 2019-12-24 RX ORDER — CEPHALEXIN 250 MG/1
250 CAPSULE ORAL 4 TIMES DAILY
COMMUNITY
End: 2019-12-25 | Stop reason: HOSPADM

## 2019-12-24 RX ORDER — SUCRALFATE ORAL 1 G/10ML
1 SUSPENSION ORAL
Status: DISCONTINUED | OUTPATIENT
Start: 2019-12-24 | End: 2019-12-25 | Stop reason: HOSPADM

## 2019-12-24 RX ORDER — CEPHALEXIN 250 MG/1
250 CAPSULE ORAL 4 TIMES DAILY
Status: COMPLETED | OUTPATIENT
Start: 2019-12-24 | End: 2019-12-24

## 2019-12-24 RX ORDER — IPRATROPIUM BROMIDE AND ALBUTEROL SULFATE 2.5; .5 MG/3ML; MG/3ML
3 SOLUTION RESPIRATORY (INHALATION) EVERY 4 HOURS PRN
Status: DISCONTINUED | OUTPATIENT
Start: 2019-12-24 | End: 2019-12-25 | Stop reason: HOSPADM

## 2019-12-24 RX ORDER — NICOTINE POLACRILEX 4 MG
15 LOZENGE BUCCAL
Status: DISCONTINUED | OUTPATIENT
Start: 2019-12-24 | End: 2019-12-25 | Stop reason: HOSPADM

## 2019-12-24 RX ORDER — CLINDAMYCIN HYDROCHLORIDE 150 MG/1
150 CAPSULE ORAL 3 TIMES DAILY
COMMUNITY
End: 2019-12-25 | Stop reason: HOSPADM

## 2019-12-24 RX ADMIN — BARIUM SULFATE 135 ML: 980 POWDER, FOR SUSPENSION ORAL at 14:00

## 2019-12-24 RX ADMIN — INSULIN LISPRO 2 UNITS: 100 INJECTION, SOLUTION INTRAVENOUS; SUBCUTANEOUS at 21:26

## 2019-12-24 RX ADMIN — LISINOPRIL 30 MG: 20 TABLET ORAL at 15:27

## 2019-12-24 RX ADMIN — DILTIAZEM HYDROCHLORIDE 120 MG: 120 CAPSULE, COATED, EXTENDED RELEASE ORAL at 15:28

## 2019-12-24 RX ADMIN — BARIUM SULFATE 700 MG: 700 TABLET ORAL at 14:14

## 2019-12-24 RX ADMIN — SUCRALFATE 1 G: 1 SUSPENSION ORAL at 21:26

## 2019-12-24 RX ADMIN — CEPHALEXIN 250 MG: 250 CAPSULE ORAL at 15:27

## 2019-12-24 RX ADMIN — SUCRALFATE 1 G: 1 SUSPENSION ORAL at 17:27

## 2019-12-24 RX ADMIN — PANTOPRAZOLE SODIUM 40 MG: 40 INJECTION, POWDER, FOR SOLUTION INTRAVENOUS at 17:27

## 2019-12-24 RX ADMIN — SUCRALFATE 1 G: 1 SUSPENSION ORAL at 15:27

## 2019-12-24 RX ADMIN — BARIUM SULFATE 183 ML: 960 POWDER, FOR SUSPENSION ORAL at 14:00

## 2019-12-24 NOTE — CONSULTS
[facility]  Cardiology Consult Note    Patient Identification:  Name: Doc Coyne  Age: 79 y.o.  Sex: male  :  1940  MRN: [id number]             Requesting Physician: Chest pain  Reason for Consultation / Chief Complaint: NELSON Moreira    History of Present Illness:   Doc Coyne is a 79-year-old male, who is current with our service.  He has a history of CAD, atrial fibrillation on anticoagulation,, BPH, COPD, diabetes mellitus type 2, hypertension, and prostate cancer.  He presented to [facility] for chest pain and asymptomatic bradycardia.  He had stated he is developed midline, lower chest chest pain while at work which he described as aching and occasional sharp sensations.  He states he has been noticing chest pain since he had started on antibiotics for a cellulitis of his hand.  His troponin and ECG at [facility] were both negative.  Pounding here on admission troponin negative x1, .2, glucose 123, potassium 4.6, creatinine 0.91, GFR 81, AST/ALT WNL, INR 3.40, hemoglobin 14.1, platelets 184.  Lipid panel on 10/30/2019 showed TC 97, HDL 40, LDL 49, and triglycerides 42.  Previous echo on 2018 showed EF 51%, mild pulmonic valve regurgitation, calcification of the aortic valve, RBC is mildly dilated, mild to moderate tricuspid valve regurgitation is present, LAC size is mild to moderately dilated, mild MV regurgitation is present, no evidence of pericardial effusion.  Stress test 2019 was a normal myocardial perfusion study.  Cardiac cath 2016 showed early atherosclerotic plaque, mild LV dysfunction at that time recommendations were to treat medically.  Chest x-ray is pending.          Comorbid cardiac risk factors: Hypertension, COPD, diabetes mellitus    Past Medical History:  Past Medical History:   Diagnosis Date   • Atherosclerotic heart disease of native coronary artery without angina pectoris    • Atrial fibrillation (CMS/HCC)    •  Benign prostatic hypertrophy    • Chest pain    • COPD (chronic obstructive pulmonary disease) (CMS/HCC)    • Diabetes mellitus (CMS/HCC)    • Hypertension    • Prostate cancer (CMS/HCC)      Past Surgical History:  Past Surgical History:   Procedure Laterality Date   • COLONOSCOPY     • HEMORRHOIDECTOMY     • PROSTATE SURGERY        Allergies:  Allergies   Allergen Reactions   • Isosorbide Nitrate    • Novocain  [Procaine]    • Penicillins    • Propoxyphene    • Cephalexin Rash   • Doxycycline Rash   • Sulfa Antibiotics Rash     Home Meds:  Medications Prior to Admission   Medication Sig Dispense Refill Last Dose   • CARTIA  MG 24 hr capsule TAKE 1 CAPSULE BY MOUTH DAILY 90 capsule 5 12/23/2019 at 0900   • cephalexin (KEFLEX) 250 MG capsule Take 250 mg by mouth 4 (Four) Times a Day.   12/23/2019 at Unknown time   • clindamycin (CLEOCIN) 150 MG capsule Take 150 mg by mouth 3 (Three) Times a Day.   12/23/2019 at Unknown time   • lisinopril (PRINIVIL,ZESTRIL) 30 MG tablet Take 30 mg by mouth Daily.  2 12/23/2019 at 0900   • metFORMIN (GLUCOPHAGE) 500 MG tablet Take 1 tablet by mouth every 12 (twelve) hours. TAKE 1 TABLET BY MOUTH EVERY 12 HOURS WITH FOOD   12/23/2019 at 0900   • metoprolol succinate XL (TOPROL-XL) 50 MG 24 hr tablet Take 1 tablet by mouth daily.   12/23/2019 at 0900   • NITROSTAT 0.4 MG SL tablet Place 0.4 mg under the tongue Every 5 (Five) Minutes As Needed.  1 12/24/2019 at 0300   • XARELTO 20 MG tablet TAKE 1 TABLET BY MOUTH DAILY 30 tablet 0 12/23/2019 at 1700   • nystatin (MYCOSTATIN) 633037 UNIT/ML suspension Take 5 mL by mouth 4 (Four) Times a Day. Swish and swallow 473 mL 0      Current Meds:      cephalexin (KEFLEX) capsule 250 mg 250 mg, PO, 4x Daily Ordered   dilTIAZem CD (CARDIZEM CD) 24 hr capsule 120 mg 120 mg, PO, Daily Ordered   insulin lispro (humaLOG) injection 0-9 Units 0-9 Units, SC, 4x Daily With Meals & Nightly Ordered   lisinopril (PRINIVIL,ZESTRIL) tablet 30 mg 30 mg, PO,  "Daily Ordered   pantoprazole (PROTONIX) injection 40 mg 40 mg, IV, BID AC Ordered   sucralfate (CARAFATE) 1 GM/10ML suspension 1 g 1 g, PO, 4x Daily AC & at Bedtime Ordered      PRN     Medication Dose/Rate, Route, Frequency Last Action   dextrose (D50W) 25 g/ 50mL Intravenous Solution 25 g 25 g, IV, Q15 Min PRN Ordered   dextrose (GLUTOSE) oral gel 15 g 15 g, PO, Q15 Min PRN Ordered   glucagon (human recombinant) (GLUCAGEN DIAGNOSTIC) injection 1 mg 1 mg, SC, Q15 Min PRN Ordered   ipratropium-albuterol (DUO-NEB) nebulizer solution 3 mL 3 mL, NEBULIZATION, Q4H PRN Ordered          Social History:   Social History     Tobacco Use   • Smoking status: Current Every Day Smoker     Packs/day: 0.50     Types: Cigarettes   • Smokeless tobacco: Never Used   Substance Use Topics   • Alcohol use: Yes     Comment: OCCASIONALLY      Family History:  Family History   Problem Relation Age of Onset   • Hypertension Father    • Heart failure Father    • Hyperlipidemia Father    • Heart disease Father    • Heart attack Father    • Heart attack Sister    • Stroke Mother         Review of Systems  Constitutional: No wt loss, fever   Gastrointestinal: No nausea , abdominal pain  Behavioral/Psych: No insomnia or anxiety   Cardiovascular ----positive for cp. All other systems reviewed and are negative                     Physical Exam  /78 (BP Location: Left arm, Patient Position: Lying)   Pulse 56   Temp 98 °F (36.7 °C) (Oral)   Resp 18   Ht 182.9 cm (72.01\")   Wt 91.4 kg (201 lb 6.4 oz)   SpO2 96%   BMI 27.31 kg/m²     General appearance: No acute changes   Eyes: Sclera conjunctiva normal, pupils reactive   HENT: Atraumatic; oropharynx clear with moist mucous membranes and no mucosal ulcerations;  Neck: Trachea midline; NECK, supple, no thyromegaly or lymphadenopathy   Lungs: Normal size and shape, normal breath sounds, equal distribution of air, no rales and rhonchi   CV: S1-S2 regular, no murmurs, no rub, no gallop "   Abdomen: Soft, non-tender; no masses , no abnormal abdominal sounds   Extremities: No deformity , normal color , no peripheral edema   Skin: Normal temperature, turgor and texture; no rash, ulcers  Psych: Appropriate affect, alert and oriented to person, place and time                   Cardiographics  ECG:     Telemetry:    Echocardiogram:   Interpretation Summary     · Mild pulmonic valve regurgitation is present.  · There is calcification of the aortic valve.  · Right ventricular cavity is mildly dilated.  · Mild to moderate tricuspid valve regurgitation is present.  · Left atrial cavity size is mild-to-moderately dilated.  · Mild mitral valve regurgitation is present  · Calculated EF = 51%  · There is no evidence of pericardial effusion.      8/8/19  Interpretation Summary        · Findings consistent with a normal ECG stress test.  · Left ventricular ejection fraction is normal (Calculated EF = 60%).  · Myocardial perfusion imaging indicates a normal myocardial perfusion study with no evidence of ischemia.  · Impressions are consistent with a low risk study.        1/12/16    FINAL CONCLUSIONS:   1.  Early atherosclerotic plaque.   2.  Mild LV dysfunction.      DISCUSSION:  Considering the patient's early atherosclerotic plaque with mild  LV dysfunction, the patient will be treated medically.      Imaging  Chest X-ray:     Lab Review   Results from last 7 days   Lab Units 12/24/19  1032   TROPONIN T ng/mL <0.010         Results from last 7 days   Lab Units 12/24/19  1032   SODIUM mmol/L 139   POTASSIUM mmol/L 4.6   BUN mg/dL 25*   CREATININE mg/dL 0.91   CALCIUM mg/dL 9.3        Results from last 7 days   Lab Units 12/24/19  1032   WBC 10*3/mm3 6.41   HEMOGLOBIN g/dL 14.1   HEMATOCRIT % 42.8   PLATELETS 10*3/mm3 184     Results from last 7 days   Lab Units 12/24/19  1032   INR  3.40*     I have reviewed the patient's recent medical history and current medications, as well as personally reviewed/interpreted  the ECG/telemetry data    The following medical decision was discussed in detail with Dr. Rosado    Assessment:  1.  Chest pain  2.  Atherosclerotic heart disease of native coronary artery without angina pectoris  3.  Hypertension  4. Benign prostatic hypertrophy  5. COPD  6. Type 2 diabetes mellitus  7. Bradycardia  8. Anticoagulated  9. Atrial fibrillation    Recommendations / Plan:       Labs/tests ordered for rogelio      Hawa SaraviaNELSON  12/24/2019, 1:10 PM  Patient with atypical chest pains along with a history of the hypertension will need to repeat EKGs stress test and will proceed with a diagnostic stress test  Joel Rosado MD      EMR Dragon/Transcription:   Dictated utilizing Dragon dictation

## 2019-12-24 NOTE — PLAN OF CARE
DA from [facility]; dx chest pain. No c/o chest pain this shift. Lower epigastric discomfort. Cardiology consulted. Accu checks AC/HS. No SSI given.  HR bradycardic 40-50's. CXR and Esophagram completed. ST consulted for voiced c/o issues swallowing pills, no c/o swallowing to nurse.

## 2019-12-24 NOTE — SIGNIFICANT NOTE
12/24/19 1315   Rehab Time/Intention   Evaluation Not Performed other (see comments)  (Pt STEPHANI for testing. Order received for pill dysphagia. Pt currently on a regular diet. Will continue to follow.)   Rehab Treatment   Discipline speech language pathologist

## 2019-12-24 NOTE — H&P
"    Patient Name:  Doc Coyne  YOB: 1940  MRN:  [id number]  Admit Date:  12/24/2019  Patient Care Team:  Luiz Nicolas MD as PCP - General  Luiz Nicolas MD as PCP - Family Medicine      Subjective   History Present Illness     Chief complaint-atypical chest pain    Mr. Coyne is a 79 y.o. smoker with a history of atrial fibrillation on Xarelto, CAD, HTN, HLD, BPH, prostate cancer, COPD that has been direct admitted from [facility] for chest pain and asymptomatic bradycardia.  Patient works at shift and states he was sitting in his desk when he developed chest pain described as a aching and occasional sharp sensation.  No exacerbating or relieving factors at that time.  He denies palpitations, lightheadedness, nausea, vomiting diaphoresis, pain radiation, or shortness of breath.  The pain is located in his midline, lower chest.  He points to his xiphoid process and slightly below it.  The patient has been on clindamycin and cephalexin since 12/19 for cellulitis of his hand.  Since taking that medication he reports that he has had increased epigastric pain and chest pain.  The pills are difficult to swallow and at times he feels that the pills \"do not go down easily.\"  He states \"the pills are making me sick and upsetting my stomach.\"  He denies odynophagia or prior dysphagia to pills, food, or liquids.  He has a history of GERD not treated and not worse in the last few months.   Patient currently takes Xarelto, beta-blockade, and ACE.  Workup at [facility] demonstrated negative troponin andEKG without ischemic change.  He is in afib with heart rate ranging from 48 to 60s.  He denies dizziness on standing, lightheadedness, weakness, shortness of breath, or chest pain.      History of Present Illness  Review of Systems   Constitutional: Positive for appetite change. Negative for activity change, fatigue and unexpected weight change.   HENT: Negative for congestion and " trouble swallowing.    Respiratory: Negative for cough, choking, chest tightness, shortness of breath, wheezing and stridor.    Cardiovascular: Positive for chest pain (atypical). Negative for palpitations and leg swelling.   Gastrointestinal: Positive for abdominal pain and nausea. Negative for abdominal distention, blood in stool, constipation, diarrhea and vomiting.   Endocrine: Negative for polydipsia, polyphagia and polyuria.   Genitourinary: Negative for dysuria.   Musculoskeletal: Negative for back pain.   Skin: Negative for pallor.   Neurological: Negative for weakness.   Hematological: Bruises/bleeds easily.   Psychiatric/Behavioral: Negative for confusion.        Personal History     Past Medical History:   Diagnosis Date   • Atherosclerotic heart disease of native coronary artery without angina pectoris    • Atrial fibrillation (CMS/HCC)    • Benign prostatic hypertrophy    • Chest pain    • COPD (chronic obstructive pulmonary disease) (CMS/HCC)    • Diabetes mellitus (CMS/HCC)    • Hypertension    • Prostate cancer (CMS/HCC)      Past Surgical History:   Procedure Laterality Date   • COLONOSCOPY     • HEMORRHOIDECTOMY     • PROSTATE SURGERY       Family History   Problem Relation Age of Onset   • Hypertension Father    • Heart failure Father    • Hyperlipidemia Father    • Heart disease Father    • Heart attack Father    • Heart attack Sister    • Stroke Mother      Social History     Tobacco Use   • Smoking status: Current Every Day Smoker     Packs/day: 0.50     Types: Cigarettes   • Smokeless tobacco: Never Used   Substance Use Topics   • Alcohol use: Yes     Comment: OCCASIONALLY   • Drug use: No     No current facility-administered medications on file prior to encounter.      Current Outpatient Medications on File Prior to Encounter   Medication Sig Dispense Refill   • CARTIA  MG 24 hr capsule TAKE 1 CAPSULE BY MOUTH DAILY 90 capsule 5   • lisinopril (PRINIVIL,ZESTRIL) 30 MG tablet Take 30 mg  by mouth Daily.  2   • metFORMIN (GLUCOPHAGE) 500 MG tablet Take 1 tablet by mouth every 12 (twelve) hours. TAKE 1 TABLET BY MOUTH EVERY 12 HOURS WITH FOOD     • metoprolol succinate XL (TOPROL-XL) 50 MG 24 hr tablet Take 1 tablet by mouth daily.     • NITROSTAT 0.4 MG SL tablet Place 0.4 mg under the tongue Every 5 (Five) Minutes As Needed.  1   • XARELTO 20 MG tablet TAKE 1 TABLET BY MOUTH DAILY 30 tablet 0   • nystatin (MYCOSTATIN) 701258 UNIT/ML suspension Take 5 mL by mouth 4 (Four) Times a Day. Swish and swallow 473 mL 0     Allergies   Allergen Reactions   • Isosorbide Nitrate    • Novocain  [Procaine]    • Penicillins    • Propoxyphene    • Cephalexin Rash   • Doxycycline Rash   • Sulfa Antibiotics Rash       Objective    Objective     Vital Signs  Temp:  [97.7 °F (36.5 °C)] 97.7 °F (36.5 °C)  Heart Rate:  [52] 52  Resp:  [18] 18  BP: (125)/(73) 125/73  SpO2:  [94 %] 94 %  on   ;   Device (Oxygen Therapy): room air  Body mass index is 27.3 kg/m².    Physical Exam   Constitutional: He is oriented to person, place, and time. He appears well-developed and well-nourished. No distress.   HENT:   Head: Normocephalic and atraumatic.   Mouth/Throat: Oropharynx is clear and moist.   Eyes: Conjunctivae and EOM are normal.   Neck: Normal range of motion. No tracheal deviation present.   Cardiovascular: An irregular rhythm present. Bradycardia present.   Pulmonary/Chest: Effort normal and breath sounds normal. No respiratory distress.   Abdominal: Soft. Bowel sounds are normal. He exhibits no distension and no mass. There is tenderness in the epigastric area. There is no rebound and no guarding.   Musculoskeletal: Normal range of motion.   Neurological: He is alert and oriented to person, place, and time. No cranial nerve deficit.   Skin: Skin is warm and dry.   Psychiatric: He has a normal mood and affect. His behavior is normal. Judgment and thought content normal.   Nursing note and vitals reviewed.      Results  Review:  I reviewed the patient's new clinical results.  I reviewed the patient's new imaging results and agree with the interpretation.  I reviewed the patient's other test results and agree with the interpretation  I personally viewed and interpreted the patient's EKG/Telemetry data  Discussed with ED provider.    Lab Results (last 24 hours)     Procedure Component Value Units Date/Time    Troponin [[id number]]  (Normal) Collected:  12/24/19 1032    Specimen:  Blood Updated:  12/24/19 1108     Troponin T <0.010 ng/mL     Narrative:       Troponin T Reference Range:  <= 0.03 ng/mL-   Negative for AMI  >0.03 ng/mL-     Abnormal for myocardial necrosis.  Clinicians would have to utilize clinical acumen, EKG, Troponin and serial changes to determine if it is an Acute Myocardial Infarction or myocardial injury due to an underlying chronic condition.     CBC & Differential [[id number]] Collected:  12/24/19 1032    Specimen:  Blood Updated:  12/24/19 1042    Narrative:       The following orders were created for panel order CBC & Differential.  Procedure                               Abnormality         Status                     ---------                               -----------         ------                     CBC Auto Differential[[id number]]        Abnormal            Final result                 Please view results for these tests on the individual orders.    Comprehensive Metabolic Panel [[id number]]  (Abnormal) Collected:  12/24/19 1032    Specimen:  Blood Updated:  12/24/19 1107     Glucose 123 mg/dL      BUN 25 mg/dL      Creatinine 0.91 mg/dL      Sodium 139 mmol/L      Potassium 4.6 mmol/L      Chloride 101 mmol/L      CO2 26.2 mmol/L      Calcium 9.3 mg/dL      Total Protein 7.3 g/dL      Albumin 4.10 g/dL      ALT (SGPT) 16 U/L      AST (SGOT) 16 U/L      Alkaline Phosphatase 55 U/L      Total Bilirubin 0.7 mg/dL      eGFR Non African Amer 81 mL/min/1.73      Globulin 3.2 gm/dL      A/G Ratio 1.3 g/dL       BUN/Creatinine Ratio 27.5     Anion Gap 11.8 mmol/L     Narrative:       GFR Normal >60  Chronic Kidney Disease <60  Kidney Failure <15      BNP [[id number]]  (Normal) Collected:  12/24/19 1032    Specimen:  Blood Updated:  12/24/19 1108     proBNP 673.2 pg/mL     Narrative:       Among patients with dyspnea, NT-proBNP is highly sensitive for the detection of acute congestive heart failure. In addition NT-proBNP of <300 pg/ml effectively rules out acute congestive heart failure with 99% negative predictive value.      Lipase [[id number]]  (Normal) Collected:  12/24/19 1032    Specimen:  Blood Updated:  12/24/19 1107     Lipase 17 U/L     Protime-INR [[id number]]  (Abnormal) Collected:  12/24/19 1032    Specimen:  Blood Updated:  12/24/19 1051     Protime 34.1 Seconds      INR 3.40    CBC Auto Differential [[id number]]  (Abnormal) Collected:  12/24/19 1032    Specimen:  Blood Updated:  12/24/19 1042     WBC 6.41 10*3/mm3      RBC 4.57 10*6/mm3      Hemoglobin 14.1 g/dL      Hematocrit 42.8 %      MCV 93.7 fL      MCH 30.9 pg      MCHC 32.9 g/dL      RDW 12.7 %      RDW-SD 43.3 fl      MPV 9.6 fL      Platelets 184 10*3/mm3      Neutrophil % 63.8 %      Lymphocyte % 25.3 %      Monocyte % 8.4 %      Eosinophil % 0.8 %      Basophil % 0.5 %      Immature Grans % 1.2 %      Neutrophils, Absolute 4.09 10*3/mm3      Lymphocytes, Absolute 1.62 10*3/mm3      Monocytes, Absolute 0.54 10*3/mm3      Eosinophils, Absolute 0.05 10*3/mm3      Basophils, Absolute 0.03 10*3/mm3      Immature Grans, Absolute 0.08 10*3/mm3      nRBC 0.0 /100 WBC     POC Glucose Once [[id number]]  (Normal) Collected:  12/24/19 1114    Specimen:  Blood Updated:  12/24/19 1117     Glucose 114 mg/dL           Imaging Results (Last 24 Hours)     Procedure Component Value Units Date/Time    XR Chest PA & Lateral [[id number]] Resulted:  12/24/19 1139     Updated:  12/24/19 1139          Results for orders placed in visit on 05/14/18   Adult Transthoracic Echo  Complete W/ Cont if Necessary Per Protocol    Narrative · Mild pulmonic valve regurgitation is present.  · There is calcification of the aortic valve.  · Right ventricular cavity is mildly dilated.  · Mild to moderate tricuspid valve regurgitation is present.  · Left atrial cavity size is mild-to-moderately dilated.  · Mild mitral valve regurgitation is present  · Calculated EF = 51%  · There is no evidence of pericardial effusion.          No orders to display        Assessment/Plan     Active Hospital Problems    Diagnosis  POA   • **Chest pain, atypical [R07.89]  Yes   • Atherosclerotic heart disease of native coronary artery without angina pectoris [I25.10]  Yes   • Hypertension [I10]  Yes   • Benign prostatic hypertrophy [N40.0]  Yes   • COPD (chronic obstructive pulmonary disease) (CMS/[facility]) [J44.9]  Yes   • Type 2 diabetes mellitus, without long-term current use of insulin (CMS/[facility]) [E11.9]  Yes   • Bradycardia [R00.1]  Yes   • Anticoagulated [Z79.01]  Not Applicable   • Atrial fibrillation (CMS/[facility]) [I48.91]  Yes      Resolved Hospital Problems   No resolved problems to display.       Mr. Coyne is a 79 y.o. smoker with a history of A. fib on Xarelto, CAD, COPD, DM 2 that is been admitted from [facility] with chest pain, atrial fibrillation with rate that dropped to 45 while sleeping.  He is asymptomatic at this time and walking around the room without issue.  · Patient describes chest pain below his xiphoid process that seems to be associated with taking clindamycin and Keflex since 12/19 for hand cellulitis which has been improving.  · He denies emesis, coffee-ground emesis, melena, or hematochezia. Hgb is normal.  · Suspect chest pain is secondary to pill esophagitis with clindamycin  · Start PPI twice daily IV and check esophagram.  Will hold Xarelto in case GI consult/ EGD needed base on imaging. SLP sage.    · Will ask cardiology to see. he is established with Dr. Rosado.   · Check EKG,  troponin, chest x-ray and lipase.  · Will defer adjustment of the blockade to cardiology.  He is asymptomatic with his bradycardia.   · Hold metformin.  Correctional Humalog.  Duo nebs PRN for COPD without exacerbation  · Tobacco cessation     ·    · I discussed the patient's findings and my recommendations with patient, family, nursing staff and consulting provider.    VTE Prophylaxis - SCDs for now. Elevated INR   Code Status - Full code.       NELSON Ryder  [facility]  12/24/19  11:43 AM     Attending Note:    I have personally interviewed and examined the patient and agree with the above note.  Patient with a history of Afib on xarelto, CAD, HTN, HLD, COPD, and prostate cancer as well as recent cellulitis of the right hand on treatment with clindamycin and keflex of which he has 7d left presenting with atypical chest pain and upset stomach.  His troponins are negative and ECG is showing no ischemic changes.  He has no respiratory symptoms.  These issues started shortly after he started taking his antibiotics and I strongly suspect that he has pill esophagitis from clindamycin.  Will start on PPI and carafate and check esophagram.  Will switch to the liquid form of clindamycin.  He does have some asymptomatic bradycardia which I do not think is related or requires treatment but given afib will ask cardiology opinion. If he improves and nothing concerning on workup he may be discharged tomorrow.    Attending Physical Exam   Constitutional: He is oriented to person, place, and time. He appears well-developed and well-nourished. No distress.   Head: Normocephalic and atraumatic.   Mouth/Throat: Oropharynx is clear and moist.   Eyes: Conjunctivae and EOM are normal.   Neck: Normal range of motion. No tracheal deviation present.   Cardiovascular: An irregularly irregular rhythm is present.  Bradycardia present.   Pulmonary/Chest: Effort normal and breath sounds normal. No respiratory  distress.   Abdominal: Soft. Bowel sounds are normal. He exhibits no distension and no mass. There is mild epigastric tenderness. There is no rebound and no guarding.   Musculoskeletal: Normal range of motion.   Neurological: He is alert and oriented to person, place, and time. No cranial nerve deficit.   Skin: Skin is warm and dry.   Psychiatric: He has a normal mood and affect. His behavior is normal. Judgment and thought content normal.   Nursing note and vitals reviewed.    Sagar Mares MD  12/24/19  3:45 PM

## 2019-12-25 VITALS
DIASTOLIC BLOOD PRESSURE: 78 MMHG | WEIGHT: 201.4 LBS | RESPIRATION RATE: 18 BRPM | TEMPERATURE: 98 F | HEIGHT: 72 IN | HEART RATE: 56 BPM | SYSTOLIC BLOOD PRESSURE: 118 MMHG | OXYGEN SATURATION: 96 % | BODY MASS INDEX: 27.28 KG/M2

## 2019-12-25 LAB
ANION GAP SERPL CALCULATED.3IONS-SCNC: 10.2 MMOL/L (ref 5–15)
BUN BLD-MCNC: 20 MG/DL (ref 8–23)
BUN/CREAT SERPL: 23 (ref 7–25)
CALCIUM SPEC-SCNC: 8.6 MG/DL (ref 8.6–10.5)
CHLORIDE SERPL-SCNC: 104 MMOL/L (ref 98–107)
CO2 SERPL-SCNC: 26.8 MMOL/L (ref 22–29)
CREAT BLD-MCNC: 0.87 MG/DL (ref 0.76–1.27)
DEPRECATED RDW RBC AUTO: 40.2 FL (ref 37–54)
ERYTHROCYTE [DISTWIDTH] IN BLOOD BY AUTOMATED COUNT: 12.3 % (ref 12.3–15.4)
GFR SERPL CREATININE-BSD FRML MDRD: 85 ML/MIN/1.73
GLUCOSE BLD-MCNC: 138 MG/DL (ref 65–99)
GLUCOSE BLDC GLUCOMTR-MCNC: 138 MG/DL (ref 70–130)
GLUCOSE BLDC GLUCOMTR-MCNC: 147 MG/DL (ref 70–130)
HBA1C MFR BLD: 6.5 % (ref 4.8–5.6)
HCT VFR BLD AUTO: 38.7 % (ref 37.5–51)
HGB BLD-MCNC: 13.1 G/DL (ref 13–17.7)
MCH RBC QN AUTO: 31 PG (ref 26.6–33)
MCHC RBC AUTO-ENTMCNC: 33.9 G/DL (ref 31.5–35.7)
MCV RBC AUTO: 91.5 FL (ref 79–97)
PLATELET # BLD AUTO: 168 10*3/MM3 (ref 140–450)
PMV BLD AUTO: 9.8 FL (ref 6–12)
POTASSIUM BLD-SCNC: 4.2 MMOL/L (ref 3.5–5.2)
RBC # BLD AUTO: 4.23 10*6/MM3 (ref 4.14–5.8)
SODIUM BLD-SCNC: 141 MMOL/L (ref 136–145)
WBC NRBC COR # BLD: 6.62 10*3/MM3 (ref 3.4–10.8)

## 2019-12-25 PROCEDURE — G0378 HOSPITAL OBSERVATION PER HR: HCPCS

## 2019-12-25 PROCEDURE — 93005 ELECTROCARDIOGRAM TRACING: CPT | Performed by: NURSE PRACTITIONER

## 2019-12-25 PROCEDURE — 85027 COMPLETE CBC AUTOMATED: CPT | Performed by: NURSE PRACTITIONER

## 2019-12-25 PROCEDURE — 99214 OFFICE O/P EST MOD 30 MIN: CPT | Performed by: NURSE PRACTITIONER

## 2019-12-25 PROCEDURE — 82962 GLUCOSE BLOOD TEST: CPT

## 2019-12-25 PROCEDURE — 93010 ELECTROCARDIOGRAM REPORT: CPT | Performed by: INTERNAL MEDICINE

## 2019-12-25 PROCEDURE — 80048 BASIC METABOLIC PNL TOTAL CA: CPT | Performed by: NURSE PRACTITIONER

## 2019-12-25 PROCEDURE — 83036 HEMOGLOBIN GLYCOSYLATED A1C: CPT | Performed by: NURSE PRACTITIONER

## 2019-12-25 RX ORDER — CEPHALEXIN 500 MG/1
500 CAPSULE ORAL 3 TIMES DAILY
Qty: 21 CAPSULE | Refills: 0 | Status: SHIPPED | OUTPATIENT
Start: 2019-12-25 | End: 2020-01-01

## 2019-12-25 RX ORDER — PANTOPRAZOLE SODIUM 40 MG/1
40 TABLET, DELAYED RELEASE ORAL
Status: DISCONTINUED | OUTPATIENT
Start: 2019-12-25 | End: 2019-12-25

## 2019-12-25 RX ORDER — SUCRALFATE ORAL 1 G/10ML
1 SUSPENSION ORAL
Qty: 560 ML | Refills: 0 | Status: SHIPPED | OUTPATIENT
Start: 2019-12-25 | End: 2020-01-08

## 2019-12-25 RX ORDER — PANTOPRAZOLE SODIUM 40 MG/1
40 TABLET, DELAYED RELEASE ORAL
Status: DISCONTINUED | OUTPATIENT
Start: 2019-12-25 | End: 2019-12-25 | Stop reason: HOSPADM

## 2019-12-25 RX ORDER — PANTOPRAZOLE SODIUM 40 MG/1
40 TABLET, DELAYED RELEASE ORAL DAILY
Qty: 30 TABLET | Refills: 0 | Status: SHIPPED | OUTPATIENT
Start: 2019-12-25 | End: 2019-12-25

## 2019-12-25 RX ORDER — CLINDAMYCIN PALMITATE HYDROCHLORIDE 75 MG/5ML
150 SOLUTION ORAL EVERY 8 HOURS SCHEDULED
Status: DISCONTINUED | OUTPATIENT
Start: 2019-12-25 | End: 2019-12-25 | Stop reason: HOSPADM

## 2019-12-25 RX ORDER — CLINDAMYCIN PALMITATE HYDROCHLORIDE 75 MG/5ML
300 SOLUTION ORAL 4 TIMES DAILY
Qty: 560 ML | Refills: 0 | Status: SHIPPED | OUTPATIENT
Start: 2019-12-25 | End: 2020-01-01

## 2019-12-25 RX ORDER — CLINDAMYCIN PALMITATE HYDROCHLORIDE 75 MG/5ML
300 SOLUTION ORAL 4 TIMES DAILY
Qty: 560 ML | Refills: 0 | Status: SHIPPED | OUTPATIENT
Start: 2019-12-25 | End: 2019-12-25

## 2019-12-25 RX ORDER — SUCRALFATE ORAL 1 G/10ML
1 SUSPENSION ORAL
Qty: 560 ML | Refills: 0 | Status: SHIPPED | OUTPATIENT
Start: 2019-12-25 | End: 2019-12-25

## 2019-12-25 RX ORDER — PANTOPRAZOLE SODIUM 40 MG/1
40 TABLET, DELAYED RELEASE ORAL DAILY
Qty: 30 TABLET | Refills: 0 | Status: SHIPPED | OUTPATIENT
Start: 2019-12-25 | End: 2022-11-20

## 2019-12-25 RX ADMIN — DILTIAZEM HYDROCHLORIDE 120 MG: 120 CAPSULE, COATED, EXTENDED RELEASE ORAL at 07:52

## 2019-12-25 RX ADMIN — LISINOPRIL 30 MG: 20 TABLET ORAL at 07:52

## 2019-12-25 RX ADMIN — SUCRALFATE 1 G: 1 SUSPENSION ORAL at 06:37

## 2019-12-25 RX ADMIN — SUCRALFATE 1 G: 1 SUSPENSION ORAL at 11:28

## 2019-12-25 NOTE — PLAN OF CARE
Problem: Patient Care Overview  Goal: Plan of Care Review  Outcome: Ongoing (interventions implemented as appropriate)   Pt rested well thru the night, up ad hossein, gait steady, no c/o pain ,nausea or chest pain. SB and afib, no sob, ra with sats above 94%. VSS, NAD, cont accuchecks ,  at hs given 2units.

## 2019-12-25 NOTE — DISCHARGE SUMMARY
Date of Admission: 12/24/2019  Date of Discharge:  12/25/2019  Primary Care Physician: Luiz Nicolas MD     Discharge Diagnosis:  Active Hospital Problems    Diagnosis  POA   • **Chest pain, atypical [R07.89]  Yes   • Atherosclerotic heart disease of native coronary artery without angina pectoris [I25.10]  Yes   • Hypertension [I10]  Yes   • Benign prostatic hypertrophy [N40.0]  Yes   • COPD (chronic obstructive pulmonary disease) (CMS/[facility]) [J44.9]  Yes   • Type 2 diabetes mellitus, without long-term current use of insulin (CMS/[facility]) [E11.9]  Yes   • Bradycardia [R00.1]  Yes   • Chest pain [R07.9]  Yes   • Anticoagulated [Z79.01]  Not Applicable   • Atrial fibrillation (CMS/[facility]) [I48.91]  Yes      Resolved Hospital Problems   No resolved problems to display.       Presenting Problem/History of Present Illness:  Chest pain [R07.9]     Hospital Course:  The patient is a 79 y.o. male with a history of Afib on xarelto, CAD, HTN, HLD, COPD, and prostate cancer as well as recent cellulitis of the right hand on treatment with clindamycin and keflex who presented with atypical chest pain and upset stomach shortly after starting antibiotic therapy.  Please see admission H&P from 12/24/19 for further details. His history was highly suggestive of pill esophagitis from clindamycin.  He had no evidence of bleeding. This was held initially and he was started on protonix and carafate.  He had an esophagram which showed some evidence of esophagitis and reflux. His diet was advanced and he tolerated well. He will be discharged on an increased dose of clindamycin in the liquid form as well as keflex.  He will also go on protonix and complete 2 weeks of carafate to allow healing of the esophagitis. He should keep follow up with his PCP in a week or so.   Of note, he did have some asymptomatic bradycardia which was relatively mild.  His metoprolol was held and he was continued on diltiazem.  Cardiology evaluated him and recommended  "no further workup at this time.     Exam Today:  Blood pressure 118/78, pulse 56, temperature 98 °F (36.7 °C), temperature source Oral, resp. rate 18, height 182.9 cm (72.01\"), weight 91.4 kg (201 lb 6.4 oz), SpO2 96 %.  Constitutional: He is oriented to person, place, and time. He appears well-developed and well-nourished. No distress.   Head: Normocephalic and atraumatic.   Mouth/Throat: Oropharynx is clear and moist.   Eyes: Conjunctivae and EOM are normal.   Neck: Normal range of motion. No tracheal deviation present.   Cardiovascular: Normal rate, irregularly irregular rhythm.  Pulmonary/Chest: Effort normal and breath sounds normal. No respiratory distress.   Abdominal: Soft. Bowel sounds are normal. He exhibits no distension and no mass. There is no tenderness.   Musculoskeletal: Normal range of motion. No tenderness.  Neurological: He is alert and oriented to person, place, and time. No cranial nerve deficit.   Skin: Skin is warm and dry. Resolving cellulitis of the right hand.  Psychiatric: He has a normal mood and affect. His behavior is normal. Judgment and thought content normal.   Nursing note and vitals reviewed.    Procedures Performed:  2-VIEW CHEST-12/24/19   HISTORY: Chest pain.     FINDINGS: The lungs are well-expanded and clear except for several tiny  calcified granulomas unchanged from 09/03/2019. The heart remains  slightly enlarged. There is no acute disease.    EXAM: BARIUM SWALLOW ESOPHAGRAM DATED 12/24/2019.     FINDINGS: The current preliminary AP erect chest radiograph is compared  to PA and lateral projection chest radiographs earlier on 12/24/2019 at  1145 hours. Probable granulomatous calcifications in the lungs and some  suspected strandy scarring in the lungs are again noted. No segmental or  lobar collapse or consolidation is seen. No pneumothorax is  demonstrated. The cardiac silhouette remains mildly enlarged. The  costophrenic angles are sharp. Degenerative changes in the spine " and  shoulders are demonstrated.     The patient ingested gas-forming crystals, water, thick and thin barium  liquid mixtures, and a 13 mm barium tablet with water for the current  exam. Rapid sequence imaging of the neck in lateral and AP projection as  the patient ingested barium liquid mixture demonstrated mild effacement  of posterior aspect of cervical esophagus by degenerative changes in the  cervical spine. The overall caliber of cervical esophagus appears to be  normal. No aspiration or deep laryngeal penetration was demonstrated.  Radiographs of the thoracic esophagus demonstrate normal distensibility  to the distal esophagus where there is some tapering of caliber with  esophagogastric junction luminal diameter of 1.5 cm demonstrated. There  is frequent severe spasm at the distal esophagus and esophagogastric  junction with brief retention of esophageal contents. With the patient  standing erect there is prompt emptying of esophageal contents into the  distal stomach. Small hiatal hernia is present. With patient recumbent  there was moderate to florid gastroesophageal reflux on water siphon  test. There was frequent demonstration of feline contractions in the mid  to distal thoracic esophagus. This could mask the presence of the  esophageal corrugation associated with eosinophilic esophagitis, but the  current transverse images appear transient and are therefore more  suggestive of feline contractions more generally suggestive of  esophagitis, type not determined. There is thickening of longitudinal  esophageal mucosal folds in the mid to distal thoracic esophagus,  compatible with esophagitis. While standing erect the patient was able  to swallow the 13 mm barium tablet with water, and the tablet passed  promptly into the subdiaphragmatic stomach.     A total of 1 minute 56 seconds fluoroscopy was used. A single digital  overhead radiograph and 132 digital fluoroscopic spot image radiographs  were  acquired.     CONCLUSION: No aspiration or deep laryngeal penetration was seen. Barium  liquid mixtures and 13 mm barium tablet passed promptly into the  subdiaphragmatic stomach. Small sliding hiatal herniation of the upper  stomach, apparent mild Schatzki ring with frequently superimposed severe  but brief spasticity at the distal esophagus and esophagogastric  junction, moderate to florid gastroesophageal reflux, and findings  compatible with esophagitis were demonstrated.    Consults:   Consults     Date and Time Order Name Status Description    12/24/2019 1019 Inpatient Cardiology Consult             Discharge Disposition:  Home or Self Care    Discharge Medications:     Discharge Medications      New Medications      Instructions Start Date   clindamycin 75 MG/5ML solution  Commonly known as:  CLEOCIN   300 mg, Oral, 4 Times Daily      pantoprazole 40 MG EC tablet  Commonly known as:  PROTONIX   40 mg, Oral, Daily      sucralfate 1 GM/10ML suspension  Commonly known as:  CARAFATE   1 g, Oral, 4 Times Daily Before Meals & Nightly         Changes to Medications      Instructions Start Date   cephalexin 500 MG capsule  Commonly known as:  KEFLEX  What changed:    · medication strength  · how much to take  · when to take this   500 mg, Oral, 3 Times Daily         Continue These Medications      Instructions Start Date   CARTIA  MG 24 hr capsule  Generic drug:  dilTIAZem CD   TAKE 1 CAPSULE BY MOUTH DAILY      lisinopril 30 MG tablet  Commonly known as:  PRINIVIL,ZESTRIL   30 mg, Oral, Daily      metFORMIN 500 MG tablet  Commonly known as:  GLUCOPHAGE   1 tablet, Oral, Every 12 Hours, TAKE 1 TABLET BY MOUTH EVERY 12 HOURS WITH FOOD      NITROSTAT 0.4 MG SL tablet  Generic drug:  nitroglycerin   0.4 mg, Sublingual, Every 5 Minutes PRN      nystatin 780596 UNIT/ML suspension  Commonly known as:  MYCOSTATIN   500,000 Units, Oral, 4 Times Daily, Swish and swallow      XARELTO 20 MG tablet  Generic drug:   rivaroxaban   TAKE 1 TABLET BY MOUTH DAILY         Stop These Medications    clindamycin 150 MG capsule  Commonly known as:  CLEOCIN     metoprolol succinate XL 50 MG 24 hr tablet  Commonly known as:  TOPROL-XL            Discharge Diet:   Diet Instructions     Diet: Consistent Carbohydrate, Cardiac; Thin      Discharge Diet:   Consistent Carbohydrate  Cardiac       Fluid Consistency:  Thin          Activity at Discharge:   Activity Instructions     Activity as Tolerated            Follow-up Appointments:  Future Appointments   Date Time Provider Department Center   12/26/2019 12:30 PM [facility]   12/26/2019  1:15 PM Joel Rosado MD [facility]  [facility] None     Additional Instructions for the Follow-ups that You Need to Schedule     Discharge Follow-up with PCP   As directed       Currently Documented PCP:    Luiz Nicolas MD    PCP Phone Number:    [phone]     Follow Up Details:  1 week                Sagar Mares MD  12/25/19  12:57 PM    Time Spent on Discharge Activities: Greater than 30 minutes.

## 2019-12-25 NOTE — PLAN OF CARE
Patient alert and oriented. Patient up ad hossein. No complaints of chest pain, SOA or pain. Wife is at bedside. Patient with discharge orders. No acute distress noted.

## 2019-12-25 NOTE — PROGRESS NOTES
LOS: 0 days   Patient Care Team:  Luiz Nicolas MD as PCP - General  Luiz Nicolas MD as PCP - Family Medicine      Chief Complaint: Follow-up chest pain, bradycardia       Interval History: He is sitting up in bed with no complaints this morning. Denies chest pain, shortness of breath.      Objective   Vital Signs  Temp:  [97.7 °F (36.5 °C)-98.7 °F (37.1 °C)] 98 °F (36.7 °C)  Heart Rate:  [51-56] 56  Resp:  [18] 18  BP: (110-125)/(73-92) 118/78    Intake/Output Summary (Last 24 hours) at 12/25/2019 1003  Last data filed at 12/25/2019 0845  Gross per 24 hour   Intake 1170 ml   Output --   Net 1170 ml           Physical Exam   Constitutional: He is oriented to person, place, and time. He appears well-developed and well-nourished. No distress.   HENT:   Head: Normocephalic and atraumatic.   Eyes: Pupils are equal, round, and reactive to light.   Neck: No JVD present. No thyromegaly present.   Cardiovascular: Normal rate, normal heart sounds and intact distal pulses. An irregularly irregular rhythm present.   No murmur heard.  Pulmonary/Chest: Effort normal. No respiratory distress. He has wheezes.   Abdominal: Soft. Bowel sounds are normal. He exhibits no distension. There is no splenomegaly or hepatomegaly. There is no tenderness.   Musculoskeletal: Normal range of motion. He exhibits no edema.   Neurological: He is alert and oriented to person, place, and time.   Skin: Skin is warm and dry. He is not diaphoretic. No erythema.   Psychiatric: He has a normal mood and affect. His behavior is normal. Judgment normal.       Results Review:      Results from last 7 days   Lab Units 12/25/19  0628 12/24/19  1032   SODIUM mmol/L 141 139   POTASSIUM mmol/L 4.2 4.6   CHLORIDE mmol/L 104 101   CO2 mmol/L 26.8 26.2   BUN mg/dL 20 25*   CREATININE mg/dL 0.87 0.91   GLUCOSE mg/dL 138* 123*   CALCIUM mg/dL 8.6 9.3     Results from last 7 days   Lab Units 12/24/19  1306 12/24/19  1032   TROPONIN T ng/mL <0.010 <0.010      Results from last 7 days   Lab Units 12/25/19  0628 12/24/19  1032   WBC 10*3/mm3 6.62 6.41   HEMOGLOBIN g/dL 13.1 14.1   HEMATOCRIT % 38.7 42.8   PLATELETS 10*3/mm3 168 184     Results from last 7 days   Lab Units 12/24/19  1032   INR  3.40*                   I reviewed the patient's new clinical results.  I personally viewed and interpreted the patient's EKG/Telemetry data        Medication Review:     dilTIAZem  mg Oral Daily   insulin lispro 0-9 Units Subcutaneous 4x Daily With Meals & Nightly   lisinopril 30 mg Oral Daily   pantoprazole 40 mg Oral BID AC   sucralfate 1 g Oral 4x Daily AC & at Bedtime            Assessment/Plan       Chest pain, atypical    Atrial fibrillation (CMS/HCC)    Anticoagulated    Atherosclerotic heart disease of native coronary artery without angina pectoris    Hypertension    Benign prostatic hypertrophy    COPD (chronic obstructive pulmonary disease) (CMS/[facility])    Type 2 diabetes mellitus, without long-term current use of insulin (CMS/[facility])    Bradycardia    Chest pain    1.  Atypical chest pain. Denies chest pain today.  Describes the pain as in his throat when swallowing.  Medicine suspects pill esophagitis from clindamycin.  Troponins have been negative  Will check an EKG.   2.  Coronary artery disease.  Stress test in August of this year revealed no evidence of ischemia.  3.  Atrial fibrillation.  Rate controlled on diltiazem.  Previously anticoagulated on Xarelto.  4.  Bradycardia.  HR in the 50s-60s this morning.  Asymptomatic.   5.  Hypertension.  Currently controlled.  6.  Type 2 diabetes.  7.  COPD.    Esophagram demonstrated findings compatible with esophagitis.  Discussed with Dr. Robles.  He does not need any further ischemic workup at this time.  We will sign off.      Geno Amaro, NELSON  12/25/19  10:03 AM

## 2019-12-26 NOTE — PROGRESS NOTES
Case Management Discharge Note      Final Note: Pt dc'd to home via private auto 12/25/19         Destination      No service has been selected for the patient.      Durable Medical Equipment      No service has been selected for the patient.      Dialysis/Infusion      No service has been selected for the patient.      Home Medical Care      No service has been selected for the patient.      Therapy      No service has been selected for the patient.      Community Resources      No service has been selected for the patient.        Transportation Services  Private: Car    Final Discharge Disposition Code: 01 - home or self-care

## 2020-01-09 ENCOUNTER — TELEPHONE (OUTPATIENT)
Dept: CARDIOLOGY | Facility: CLINIC | Age: 80
End: 2020-01-09

## 2020-02-07 ENCOUNTER — OFFICE VISIT (OUTPATIENT)
Dept: CARDIOLOGY | Facility: CLINIC | Age: 80
End: 2020-02-07

## 2020-02-07 VITALS
HEIGHT: 72 IN | BODY MASS INDEX: 27.5 KG/M2 | WEIGHT: 203 LBS | SYSTOLIC BLOOD PRESSURE: 154 MMHG | DIASTOLIC BLOOD PRESSURE: 80 MMHG | HEART RATE: 60 BPM

## 2020-02-07 DIAGNOSIS — R53.1 WEAKNESS: ICD-10-CM

## 2020-02-07 DIAGNOSIS — I48.20 ATRIAL FIBRILLATION, CHRONIC (HCC): ICD-10-CM

## 2020-02-07 DIAGNOSIS — R06.02 SHORTNESS OF BREATH: Primary | ICD-10-CM

## 2020-02-07 PROCEDURE — 99214 OFFICE O/P EST MOD 30 MIN: CPT | Performed by: NURSE PRACTITIONER

## 2020-02-07 PROCEDURE — 93000 ELECTROCARDIOGRAM COMPLETE: CPT | Performed by: NURSE PRACTITIONER

## 2020-02-07 NOTE — PROGRESS NOTES
Patient Name: Doc Coyne  :1940  Age: 79 y.o.  Primary Cardiologist: Joel Rosado MD  Encounter Provider:  NELSON Waldrop      Chief Complaint:   Chief Complaint   Patient presents with   • Follow-up     RECENT STROKE, WAS OUT OF XARELTO 3 DAYS   • Surgical Clearance     [facility] HPI this is a 71-year-old male, known to this provider, who comes today in follow-up requesting clearance for hand surgery.  Current diagnoses to include atrial fibrillation, shortness of breath, chest pain, and chronic anticoagulation.  ECG in office today reveals atrial fibrillation rate 70s.  Patient is anticoagulated on Xarelto and on diltiazem.  He states that he had an episode recently of focal weakness as well as facial asymmetry that past, he did not seek medical attention for this.  This happened in the setting of having missed several doses of Xarelto due to inability to afford it.  Last echo in 2018 revealed mild pulmonic valve regurgitation, calcification of aortic valve, mild dilation of RV C, mild to moderate TR, mild to moderate dilation of LAC, mild MR, EF 51% with no evidence of pericardial effusion.  Stress testing done at that time revealed no evidence of myocardial ischemia.  Catheterization in  revealed normal left main, LAD with early atherosclerotic plaque, circumflex nondominant with no atherosclerotic narrowing, RCA dominant with early atherosclerotic narrowing and mild LV dysfunction, the patient was noted to be best treated medically at that time.  Last lipid panel in May 2019 revealed triglycerides 122  HDL 39 LDL 87, the patient is not currently on statin therapy.      Patient Active Problem List   Diagnosis   • Atrial fibrillation (CMS/HCC)   • Chest pain, atypical   • Shortness of breath   • Preop cardiovascular exam   • Anticoagulated   • Atherosclerotic heart disease of native coronary artery without angina pectoris   • Hypertension    • Benign prostatic hypertrophy   • COPD (chronic obstructive pulmonary disease) (CMS/HCC)   • Type 2 diabetes mellitus, without long-term current use of insulin (CMS/HCC)   • Bradycardia   • Chest pain           The following portions of the patient's history were reviewed and updated as appropriate: allergies, current medications, past family history, past medical history, past social history, past surgical history and problem list.    Current Outpatient Medications on File Prior to Visit   Medication Sig Dispense Refill   • CARTIA  MG 24 hr capsule TAKE 1 CAPSULE BY MOUTH DAILY 90 capsule 5   • lisinopril (PRINIVIL,ZESTRIL) 30 MG tablet Take 30 mg by mouth Daily.  2   • metFORMIN (GLUCOPHAGE) 500 MG tablet Take 1 tablet by mouth every 12 (twelve) hours. TAKE 1 TABLET BY MOUTH EVERY 12 HOURS WITH FOOD     • nystatin (MYCOSTATIN) 474494 UNIT/ML suspension Take 5 mL by mouth 4 (Four) Times a Day. Swish and swallow 473 mL 0   • pantoprazole (PROTONIX) 40 MG EC tablet Take 1 tablet by mouth Daily. 30 tablet 0   • XARELTO 20 MG tablet TAKE 1 TABLET BY MOUTH DAILY 30 tablet 0   • NITROSTAT 0.4 MG SL tablet Place 0.4 mg under the tongue Every 5 (Five) Minutes As Needed.  1     No current facility-administered medications on file prior to visit.        Past Medical History:   Diagnosis Date   • Atherosclerotic heart disease of native coronary artery without angina pectoris    • Atrial fibrillation (CMS/HCC)    • Benign prostatic hypertrophy    • Chest pain    • COPD (chronic obstructive pulmonary disease) (CMS/HCC)    • Diabetes mellitus (CMS/HCC)    • Hypertension    • Prostate cancer (CMS/HCC)        Past Surgical History:   Procedure Laterality Date   • COLONOSCOPY     • HEMORRHOIDECTOMY     • PROSTATE SURGERY         Family History   Problem Relation Age of Onset   • Hypertension Father    • Heart failure Father    • Hyperlipidemia Father    • Heart disease Father    • Heart attack Father    • Heart attack  "Sister    • Stroke Mother        Social History     Socioeconomic History   • Marital status:      Spouse name: Not on file   • Number of children: Not on file   • Years of education: Not on file   • Highest education level: Not on file   Tobacco Use   • Smoking status: Current Every Day Smoker     Packs/day: 0.50     Types: Cigarettes   • Smokeless tobacco: Never Used   Substance and Sexual Activity   • Alcohol use: Yes     Comment: OCCASIONALLY   • Drug use: No   • Sexual activity: Defer       Review of Systems   Constitution: Positive for malaise/fatigue. Negative for diaphoresis.   HENT: Negative for nosebleeds and stridor.    Cardiovascular: Negative for chest pain, claudication, dyspnea on exertion, irregular heartbeat, leg swelling, near-syncope, orthopnea, palpitations, paroxysmal nocturnal dyspnea and syncope.        Intermittent, not active.   Respiratory: Negative for cough, hemoptysis, shortness of breath and wheezing.    Gastrointestinal: Negative for abdominal pain, constipation, diarrhea, hematemesis, hematochezia, melena, nausea and vomiting.   Neurological: Positive for difficulty with concentration, dizziness, light-headedness and weakness. Negative for focal weakness.   Psychiatric/Behavioral: Positive for memory loss.       OBJECTIVE:   Vital Signs  Vitals:    02/07/20 0954   BP: 154/80   Pulse: 60     Estimated body mass index is 27.53 kg/m² as calculated from the following:    Height as of this encounter: 182.9 cm (72\").    Weight as of this encounter: 92.1 kg (203 lb).    Physical Exam   Constitutional: He is oriented to person, place, and time. He appears well-developed and well-nourished. No distress.   HENT:   Head: Normocephalic and atraumatic.   Eyes: Pupils are equal, round, and reactive to light. Conjunctivae and EOM are normal.   Neck: Normal range of motion. Neck supple. No JVD present. No tracheal deviation present. No thyromegaly present.   Cardiovascular: Normal rate, " regular rhythm, normal heart sounds and intact distal pulses. Exam reveals no gallop and no friction rub.   No murmur heard.  Pulmonary/Chest: Effort normal and breath sounds normal. No respiratory distress. He has no wheezes. He has no rales. He exhibits no tenderness.   Abdominal: Soft. Bowel sounds are normal. He exhibits no distension. There is no tenderness.   Musculoskeletal: Normal range of motion. He exhibits no edema, tenderness or deformity.   Neurological: He is alert and oriented to person, place, and time.   Answers all questions appropriately;  equal and strong, smile symmetrical   Skin: Skin is warm and dry. No rash noted. He is not diaphoretic. No erythema. No pallor.   Psychiatric: He has a normal mood and affect. His behavior is normal.   Nursing note and vitals reviewed.        ECG 12 Lead  Date/Time: 2020 12:53 PM  Performed by: Tiffanie Maya APRN  Authorized by: Tiffanie Maya APRN   Comparison: compared with previous ECG from 2019  Similar to previous ECG  Rhythm: atrial fibrillation  Rate: normal    Clinical impression: abnormal EKG          Cardiac catheterization  2016  CORONARY ANGIOGRAM:   1.  The left main was normal, bifurcated into left anterior descending and  circumflex in a normal fashion.   2.  Left anterior descending shows early atherosclerotic plaque only, including  the diagonal and  branches.   3.  Circumflex artery was nondominant and free of any atherosclerotic  narrowing.   4.  The right coronary artery was dominant with early atherosclerotic  narrowing.   5.  Left ventriculogram showed mild LV dysfunction.      FINAL CONCLUSIONS:   1.  Early atherosclerotic plaque.   2.  Mild LV dysfunction.      DISCUSSION:  Considering the patient's early atherosclerotic plaque with mild  LV dysfunction, the patient will be treated medically.        Stress Testin19  Interpretation Summary        · Findings consistent with a normal ECG stress  test.  · Left ventricular ejection fraction is normal (Calculated EF = 60%).  · Myocardial perfusion imaging indicates a normal myocardial perfusion study with no evidence of ischemia.  · Impressions are consistent with a low risk study.     Asymptomatic for chest pain. ECG is negative for ischemia.   Ectopy: Atrial fibrillation is baseline rhythm with rare PVC with exercise and recovery.  B/P is appropriate for Beta-blocker therapy.  Pharmacologic study due to Beta-blocker therapy.  Able to participate in low level exercise and tolerance is fair.   Supervised by: Dr. Rosado         Cardiac Echo:  6/2019  Interpretation Summary     · Mild pulmonic valve regurgitation is present.  · There is calcification of the aortic valve.  · Right ventricular cavity is mildly dilated.  · Mild to moderate tricuspid valve regurgitation is present.  · Left atrial cavity size is mild-to-moderately dilated.  · Mild mitral valve regurgitation is present  · Calculated EF = 51%  · There is no evidence of pericardial effusion.           ASSESSMENT:      Diagnosis Plan   1. Shortness of breath  Adult Transthoracic Echo Complete W/ Cont if Necessary Per Protocol   2. Weakness  Ambulatory Referral to Neurology   3. Atrial fibrillation, chronic  ECG 12 Lead         PLAN OF CARE:     1.  Recent strokelike symptoms-patient states that he recently had an episode of focal weakness accompanied by facial asymmetry for which he did not seek medical treatment.  This happened in the setting of having missed his Xarelto for 3 days as he was unable to afford it.  I discussed with him at great length the importance of not missing any doses of his anticoagulation, as well as seeking medical attention for his possible TIAs.  Despite encouragement to transition back to Coumadin as this is more affordable he would like to continue Xarelto as his insurance is changed and he is hoping that it will be more affordable in 2 weeks once his coverage begins.   I gave him samples for 1 month as well as a 30-day card, referral has been sent to neurology, surgical clearance will not be given until you have been evaluated for possible stroke.  Will have patient scheduled for echo which was ordered several months ago.    2.  Hypertension- currently elevated in the office today, patient states that his blood pressure is better at home.  Continue current medication regimen, will consider changing diltiazem to metoprolol if blood pressure remains elevated at follow-up appointment.    Importance of controlling hypertension and blood pressure  checkup on the regular basis has been explained  Hypertension as a silent killer has been discussed  Risk reduction of the weight and regular exercises to control the hypertension has been explained      3.  Atrial fibrillation- chronic.  Anticoagulated on Xarelto.  See dictation above for recent strokelike symptoms.    In the setting of atrial fibrillation, I have discussed with the patient/family/POA the risks and benefits of anticoagulation therapy which include increased risk of bleeding and decreased risk of systemic embolization such as stroke. Patient/family/POA verbalize understanding and agree with treatment plan.      Check echo, see neurology, emphasis placed on compliance with anticoagulation, follow-up in 1 month or sooner if needed with MD.        Addendum: Discussed with MD, patient okay to be cleared for hand surgery with Lovenox bridging.  Will need to see if this will be able to be covered by his insurance particularly since he has recently had issue with affording his Lovenox.        Sincerely,   NELSON Waldrop  [facility]  02/07/20  12:53 PM

## 2020-02-10 ENCOUNTER — OFFICE VISIT (OUTPATIENT)
Dept: NEUROLOGY | Facility: CLINIC | Age: 80
End: 2020-02-10

## 2020-02-10 VITALS
SYSTOLIC BLOOD PRESSURE: 128 MMHG | OXYGEN SATURATION: 98 % | BODY MASS INDEX: 27.9 KG/M2 | DIASTOLIC BLOOD PRESSURE: 82 MMHG | WEIGHT: 206 LBS | HEIGHT: 72 IN | HEART RATE: 55 BPM

## 2020-02-10 DIAGNOSIS — G45.9 TIA (TRANSIENT ISCHEMIC ATTACK): Primary | ICD-10-CM

## 2020-02-10 PROCEDURE — 99204 OFFICE O/P NEW MOD 45 MIN: CPT | Performed by: PSYCHIATRY & NEUROLOGY

## 2020-02-10 RX ORDER — ROSUVASTATIN CALCIUM 10 MG/1
TABLET, COATED ORAL
COMMUNITY
Start: 2020-02-07 | End: 2020-11-05 | Stop reason: SDUPTHER

## 2020-02-10 NOTE — PATIENT INSTRUCTIONS
Lower blood pressure by restricting salt in diet (less than 2400 mg/day), weight loss, increase fruits, vegetables, whole grains, and low-fat dairy products.    Consider the DASH diet or Mediterranean diet.  Increase fish and poultry intake.    Avoid sodas, sweets, and red meat.    Limit alcohol consumption.    Monitor and keep blood pressure, cholesterol and blood sugar at goal.    Avoid the use of tobacco and avoid secondhand smoke.    Engage in moderate to vigorous intensity aerobic exercise for about 40 minutes 3-4 times per week.  Consider lower intensity jonas chi or yoga if necessary.    Take antiplatelet medication regularly.    If you snore, wake up unrefreshed or feel tired during the day, talk to your doctor as these may be signs of sleep apnea and a sleep study may be indicated.

## 2020-02-10 NOTE — PROGRESS NOTES
Subjective:     Patient ID: Doc Coyne is a 79 y.o. male.    Mr. Coyne is a 79 year old right handed male with a h/o COPD, HTN, HLD, GERD, A. Fib, and DM complicated by neurology who presents to the neurology clinic today as a new patient for the evaluation of weakness.  I reviewed the patient's records.  The patient was referred on February 7, 2020 for the evaluation of weakness.  I reviewed the referring providers note from that day.  It reports that the patient had a recent stroke and was out of Xarelto for 3 days.  He also has history of atrial fibrillation on chronic anticoagulation.  He had had an episode of focal weakness as well as facial asymmetry in the past.  He did not seek medical attention.  This happened in the setting of having missed several doses of Xarelto due to inability to afford it.  He also has hypertension and BPH.  Due to concerns for TIA referral to neurology was made.  I reviewed the patient's labs.  On December 25 his hemoglobin A1c was 6.5, on October 30, 2019 his LDL was 49.  His TSH and B12 levels were checked in the last year and both were normal.    Was with girlfriend and she noticed that he was talking funny.  This was 3 days ago.  Face was drooping.  Was having slurred speech and had left facial droop.  That lasted about 5 minutes.  No associated headache, no changes in vision.  The symptoms completely resolved.  Had never happened before.  Has not happened since.  Is also having memory problems.  This has been going on for a week.  Forgetting things at the grocery.  Does drive.  No problems.  Has not gotten lost.  No recent tickets.  No recent accidents.  Has not worked for 5 weeks.  Smokes tobacco.  Has not had any head imaging.       The patient's girlfriend, Susannah, accompanied him to the visit today.  She reports that she found him slouched over the wheel with left facial droop, slurred speech and confusion for several days about a week ago.  She is also concerned  about his memory and driving.  She reports that he misplaces things.          The following portions of the patient's history were reviewed and updated as appropriate: allergies, current medications, past family history, past medical history, past social history, past surgical history and problem list.    Review of Systems   Constitutional: Negative for activity change, appetite change and fatigue.   HENT: Positive for congestion, hearing loss and sinus pressure. Negative for ear pain, sore throat and trouble swallowing.    Eyes: Negative for photophobia, pain and redness.   Respiratory: Positive for apnea, cough and shortness of breath. Negative for chest tightness.    Cardiovascular: Negative for chest pain, palpitations and leg swelling.   Gastrointestinal: Negative for abdominal pain, nausea and vomiting.   Endocrine: Negative for cold intolerance, heat intolerance and polyphagia.   Musculoskeletal: Negative for back pain, gait problem and neck pain.   Skin: Negative for color change, pallor and rash.   Allergic/Immunologic: Negative for environmental allergies, food allergies and immunocompromised state.   Neurological: Positive for weakness and light-headedness. Negative for dizziness, tremors, seizures, syncope, facial asymmetry, speech difficulty, numbness and headaches.   Hematological: Negative for adenopathy. Bruises/bleeds easily.   Psychiatric/Behavioral: Positive for confusion and decreased concentration. Negative for agitation, behavioral problems, dysphoric mood, hallucinations, self-injury, sleep disturbance and suicidal ideas. The patient is nervous/anxious. The patient is not hyperactive.     I reviewed the ROS documented by the MA.  All other systems negative.      Objective:    Neurologic Exam    Physical Exam   Constitutional:  Vital signs reviewed.  No apparent distress.  Well groomed.  Eyes:  No injection, no icterus.  Fundoscopic exam performed.  No papilledema appreciated bilaterally.    Respiratory:  Normal effort.  Clear to auscultation bilaterally.  Cardiovascular:  Regular rate with an irregular rhythm.  No murmurs.  No carotid bruits. Symmetric radial pulses.  Musculoskeletal: Normal station.  Gait steady.   Muscle tone and bulk normal in the bilateral upper and lower extremities.  Strength is 5/5 in the bilateral upper and lower extremities proximally and distally except 4 out of 5 his left deltoid.  Skin:  No rashes.  Warm, dry, and intact.  Does have bruising from his anticoagulation.  Psychiatric:  Good mood.  Normal affect.    Neurologic:  Mental status-  The patient is alert and oriented to person, place and time (off on the date by 1 day). Attention/concentration is within normal limits.  Speech is fluent without dysarthria.  The patient is able to name, repeat and follow complex commands without difficulty.  Immediate memory intact.  Recalled 1 out of 3 words after 5 minutes.   Fund of knowledge normal.  He scored a 15 out of 30 on the [place].  Cranial nerves- Pupils equally round and reactive to light with intact accomodation.  Visual fields intact.  Extraocular movements intact.  Facial sensation intact.  Smile symmetric.  Hearing decreased to finger-rub bilaterally.  Palate elevates symmetrically.  SCM and trapezius are 5/5 bilaterally.  Tongue is midline.  Motor-  See musculoskeletal above.  No tremor.  Reflexes- 1+ in the bilateral biceps, trace brachioradialis, 1+ patellar and trace Achilles.  Toes down-going bilaterally.  Sensation-decreased to pinprick in the left upper and lower extremity.  Decreased vibration in the left lower extremity.  Coordination- Intact to finger to nose and heel knee shin bilaterally.   Gait- See musculoskeletal exam above.       Assessment/Plan:  The patient is a 79-year-old right-handed male with history of COPD, hypertension, hyperlipidemia, GERD, atrial fibrillation on anticoagulation, diabetes complicated by neuropathy who presents to the neurology  clinic today for an episode of slurred speech, confusion, and left facial droop.    1.  Stroke-the patient's clinical presentation is most consistent with a right hemispheric stroke.  The symptoms lasted several days and on his exam today he has weakness in the left proximal arm as well as decreased sensation in the left hemibody.  I would like to further evaluate him with an MRI of the brain as well as an MRA of the head and neck.  We also counseled about smoking.  His blood pressure looks okay today and his last LDL was at goal.  His hemoglobin A1c was slightly elevated.    2.  Cognitive impairment-based on the patient's [place] score today, the patient has significant cognitive impairment.  This might be vascular cognitive impairment.  I had extensive conversation with him and his girlfriend regarding driving.  I am concerned about him continuing to drive and I would recommend for him to stop driving.  Depending on his MRI, we may consider further neuropsychological testing.     Problems Addressed this Visit     None      Visit Diagnoses     TIA (transient ischemic attack)    -  Primary    Relevant Orders    MRI Brain Without Contrast    MRI Angiogram Head Without Contrast    MRI Angiogram Neck With & Without Contrast

## 2020-02-12 ENCOUNTER — TELEPHONE (OUTPATIENT)
Dept: CARDIOLOGY | Facility: CLINIC | Age: 80
End: 2020-02-12

## 2020-02-12 NOTE — TELEPHONE ENCOUNTER
----- Message from Erum Hernandez RN sent at 2/10/2020  9:02 AM EST -----      ----- Message -----  From: Tiffanie Maya APRN  Sent: 2/7/2020   3:40 PM EST  To: Erum Hernandez RN    Could you or someone else please find out if this patient can have Lovenox covered for bridging for his hand surgery?  I talked with Dr. ALBARADO, he said that it is okay to clear him so long as he is bridged prior, he would need to hold Xarelto for 48 hours prior to surgery, Lovenox 1 mg/kg every 12 hours x4 doses, resume Xarelto immediately following surgery.    Thanks,  Britni

## 2020-02-27 ENCOUNTER — OFFICE VISIT (OUTPATIENT)
Dept: FAMILY MEDICINE CLINIC | Facility: CLINIC | Age: 80
End: 2020-02-27

## 2020-02-27 VITALS
DIASTOLIC BLOOD PRESSURE: 68 MMHG | TEMPERATURE: 98.4 F | HEART RATE: 57 BPM | WEIGHT: 207 LBS | SYSTOLIC BLOOD PRESSURE: 112 MMHG | BODY MASS INDEX: 28.04 KG/M2 | HEIGHT: 72 IN | OXYGEN SATURATION: 98 %

## 2020-02-27 DIAGNOSIS — M25.561 CHRONIC PAIN OF BOTH KNEES: ICD-10-CM

## 2020-02-27 DIAGNOSIS — E11.9 TYPE 2 DIABETES MELLITUS WITHOUT COMPLICATION, WITHOUT LONG-TERM CURRENT USE OF INSULIN (HCC): ICD-10-CM

## 2020-02-27 DIAGNOSIS — Z00.00 MEDICARE ANNUAL WELLNESS VISIT, SUBSEQUENT: Primary | ICD-10-CM

## 2020-02-27 DIAGNOSIS — Z76.89 ENCOUNTER TO ESTABLISH CARE: ICD-10-CM

## 2020-02-27 DIAGNOSIS — I48.0 PAROXYSMAL ATRIAL FIBRILLATION (HCC): ICD-10-CM

## 2020-02-27 DIAGNOSIS — L30.9 DERMATITIS: ICD-10-CM

## 2020-02-27 DIAGNOSIS — E11.9 TYPE 2 DIABETES MELLITUS WITHOUT COMPLICATION, WITHOUT LONG-TERM CURRENT USE OF INSULIN (HCC): Primary | ICD-10-CM

## 2020-02-27 DIAGNOSIS — M25.562 CHRONIC PAIN OF BOTH KNEES: ICD-10-CM

## 2020-02-27 DIAGNOSIS — G45.9 TIA (TRANSIENT ISCHEMIC ATTACK): ICD-10-CM

## 2020-02-27 DIAGNOSIS — G89.29 CHRONIC PAIN OF BOTH KNEES: ICD-10-CM

## 2020-02-27 DIAGNOSIS — I10 ESSENTIAL HYPERTENSION: ICD-10-CM

## 2020-02-27 PROBLEM — C61 PROSTATE CA (HCC): Status: ACTIVE | Noted: 2020-02-27

## 2020-02-27 LAB
ALBUMIN SERPL-MCNC: 4.3 G/DL (ref 3.5–5.2)
ALBUMIN/GLOB SERPL: 1.5 G/DL
ALP SERPL-CCNC: 51 U/L (ref 39–117)
ALT SERPL W P-5'-P-CCNC: 42 U/L (ref 1–41)
ANION GAP SERPL CALCULATED.3IONS-SCNC: 11.7 MMOL/L (ref 5–15)
AST SERPL-CCNC: 30 U/L (ref 1–40)
BILIRUB SERPL-MCNC: 0.6 MG/DL (ref 0.2–1.2)
BUN BLD-MCNC: 17 MG/DL (ref 8–23)
BUN/CREAT SERPL: 21.3 (ref 7–25)
CALCIUM SPEC-SCNC: 9.2 MG/DL (ref 8.6–10.5)
CHLORIDE SERPL-SCNC: 102 MMOL/L (ref 98–107)
CHOLEST SERPL-MCNC: 114 MG/DL (ref 0–200)
CO2 SERPL-SCNC: 26.3 MMOL/L (ref 22–29)
CREAT BLD-MCNC: 0.8 MG/DL (ref 0.76–1.27)
ERYTHROCYTE [DISTWIDTH] IN BLOOD BY AUTOMATED COUNT: 13.6 % (ref 12.3–15.4)
GFR SERPL CREATININE-BSD FRML MDRD: 93 ML/MIN/1.73
GLOBULIN UR ELPH-MCNC: 2.8 GM/DL
GLUCOSE BLD-MCNC: 137 MG/DL (ref 65–99)
HCT VFR BLD AUTO: 46.6 % (ref 37.5–51)
HDLC SERPL-MCNC: 47 MG/DL (ref 40–60)
HGB BLD-MCNC: 15.4 G/DL (ref 13–17.7)
LDLC SERPL CALC-MCNC: 54 MG/DL (ref 0–100)
LDLC/HDLC SERPL: 1.15 {RATIO}
LYMPHOCYTES # BLD AUTO: 2.5 10*3/MM3 (ref 0.7–3.1)
LYMPHOCYTES NFR BLD AUTO: 33.9 % (ref 19.6–45.3)
MCH RBC QN AUTO: 29.5 PG (ref 26.6–33)
MCHC RBC AUTO-ENTMCNC: 33 G/DL (ref 31.5–35.7)
MCV RBC AUTO: 89.3 FL (ref 79–97)
MONOCYTES # BLD AUTO: 0.5 10*3/MM3 (ref 0.1–0.9)
MONOCYTES NFR BLD AUTO: 7 % (ref 5–12)
NEUTROPHILS # BLD AUTO: 4.4 10*3/MM3 (ref 1.7–7)
NEUTROPHILS NFR BLD AUTO: 59.1 % (ref 42.7–76)
PLATELET # BLD AUTO: 155 10*3/MM3 (ref 140–450)
PMV BLD AUTO: 7.2 FL (ref 6–12)
POTASSIUM BLD-SCNC: 4.4 MMOL/L (ref 3.5–5.2)
PROT SERPL-MCNC: 7.1 G/DL (ref 6–8.5)
RBC # BLD AUTO: 5.22 10*6/MM3 (ref 4.14–5.8)
SODIUM BLD-SCNC: 140 MMOL/L (ref 136–145)
TRIGL SERPL-MCNC: 64 MG/DL (ref 0–150)
TSH SERPL DL<=0.05 MIU/L-ACNC: 1.21 UIU/ML (ref 0.27–4.2)
VLDLC SERPL-MCNC: 12.8 MG/DL (ref 5–40)
WBC NRBC COR # BLD: 7.5 10*3/MM3 (ref 3.4–10.8)

## 2020-02-27 PROCEDURE — 80053 COMPREHEN METABOLIC PANEL: CPT | Performed by: NURSE PRACTITIONER

## 2020-02-27 PROCEDURE — 99214 OFFICE O/P EST MOD 30 MIN: CPT | Performed by: NURSE PRACTITIONER

## 2020-02-27 PROCEDURE — 36415 COLL VENOUS BLD VENIPUNCTURE: CPT | Performed by: NURSE PRACTITIONER

## 2020-02-27 PROCEDURE — 84443 ASSAY THYROID STIM HORMONE: CPT | Performed by: NURSE PRACTITIONER

## 2020-02-27 PROCEDURE — 85025 COMPLETE CBC W/AUTO DIFF WBC: CPT | Performed by: NURSE PRACTITIONER

## 2020-02-27 PROCEDURE — 80061 LIPID PANEL: CPT | Performed by: NURSE PRACTITIONER

## 2020-02-27 PROCEDURE — G0439 PPPS, SUBSEQ VISIT: HCPCS | Performed by: NURSE PRACTITIONER

## 2020-02-27 NOTE — PATIENT INSTRUCTIONS
Advance Care Planning and Advance Directives     You make decisions on a daily basis - decisions about where you want to live, your career, your home, your life. Perhaps one of the most important decisions you face is your choice for future medical care. Take time to talk with your family and your healthcare team and start planning today.  Advance Care Planning is a process that can help you:  · Understand possible future healthcare decisions in light of your own experiences  · Reflect on those decision in light of your goals and values  · Discuss your decisions with those closest to you and the healthcare professionals that care for you  · Make a plan by creating a document that reflects your wishes    Surrogate Decision Maker  In the event of a medical emergency, which has left you unable to communicate or to make your own decisions, you would need someone to make decisions for you.  It is important to discuss your preferences for medical treatment with this person while you are in good health.     Qualities of a surrogate decision maker:  • Willing to take on this role and responsibility  • Knows what you want for future medical care  • Willing to follow your wishes even if they don't agree with them  • Able to make difficult medical decisions under stressful circumstances    Advance Directives  These are legal documents you can create that will guide your healthcare team and decision maker(s) when needed. These documents can be stored in the electronic medical record.    · Living Will - a legal document to guide your care if you have a terminal condition or a serious illness and are unable to communicate. States vary by statute in document names/types, but most forms may include one or more of the following:        -  Directions regarding life-prolonging treatments        -  Directions regarding artificially provided nutrition/hydration        -  Choosing a healthcare decision maker        -  Direction  regarding organ/tissue donation    · Durable Power of  for Healthcare - this document names an -in-fact to make medical decisions for you, but it may also allow this person to make personal and financial decisions for you. Please seek the advice of an  if you need this type of document.    **Advance Directives are not required and no one may discriminate against you if you do not sign one.    Medical Orders  Many states allow specific forms/orders signed by your physician to record your wishes for medical treatment in your current state of health. This form, signed in personal communication with your physician, addresses resuscitation and other medical interventions that you may or may not want.      For more information or to schedule a time with a [facility] Advance Care Planning Facilitator contact: [facility] or call [phone] and someone will contact you directly.Diabetes Mellitus and Nutrition, Adult  When you have diabetes (diabetes mellitus), it is very important to have healthy eating habits because your blood sugar (glucose) levels are greatly affected by what you eat and drink. Eating healthy foods in the appropriate amounts, at about the same times every day, can help you:  · Control your blood glucose.  · Lower your risk of heart disease.  · Improve your blood pressure.  · Reach or maintain a healthy weight.  Every person with diabetes is different, and each person has different needs for a meal plan. Your health care provider may recommend that you work with a diet and nutrition specialist (dietitian) to make a meal plan that is best for you. Your meal plan may vary depending on factors such as:  · The calories you need.  · The medicines you take.  · Your weight.  · Your blood glucose, blood pressure, and cholesterol levels.  · Your activity level.  · Other health conditions you have, such as heart or kidney disease.  How do carbohydrates affect  "me?  Carbohydrates, also called carbs, affect your blood glucose level more than any other type of food. Eating carbs naturally raises the amount of glucose in your blood. Carb counting is a method for keeping track of how many carbs you eat. Counting carbs is important to keep your blood glucose at a healthy level, especially if you use insulin or take certain oral diabetes medicines.  It is important to know how many carbs you can safely have in each meal. This is different for every person. Your dietitian can help you calculate how many carbs you should have at each meal and for each snack.  Foods that contain carbs include:  · Bread, cereal, rice, pasta, and crackers.  · Potatoes and corn.  · Peas, beans, and lentils.  · Milk and yogurt.  · Fruit and juice.  · Desserts, such as cakes, cookies, ice cream, and candy.  How does alcohol affect me?  Alcohol can cause a sudden decrease in blood glucose (hypoglycemia), especially if you use insulin or take certain oral diabetes medicines. Hypoglycemia can be a life-threatening condition. Symptoms of hypoglycemia (sleepiness, dizziness, and confusion) are similar to symptoms of having too much alcohol.  If your health care provider says that alcohol is safe for you, follow these guidelines:  · Limit alcohol intake to no more than 1 drink per day for nonpregnant women and 2 drinks per day for men. One drink equals 12 oz of beer, 5 oz of wine, or 1½ oz of hard liquor.  · Do not drink on an empty stomach.  · Keep yourself hydrated with water, diet soda, or unsweetened iced tea.  · Keep in mind that regular soda, juice, and other mixers may contain a lot of sugar and must be counted as carbs.  What are tips for following this plan?    Reading food labels  · Start by checking the serving size on the \"Nutrition Facts\" label of packaged foods and drinks. The amount of calories, carbs, fats, and other nutrients listed on the label is based on one serving of the item. Many items " "contain more than one serving per package.  · Check the total grams (g) of carbs in one serving. You can calculate the number of servings of carbs in one serving by dividing the total carbs by 15. For example, if a food has 30 g of total carbs, it would be equal to 2 servings of carbs.  · Check the number of grams (g) of saturated and trans fats in one serving. Choose foods that have low or no amount of these fats.  · Check the number of milligrams (mg) of salt (sodium) in one serving. Most people should limit total sodium intake to less than 2,300 mg per day.  · Always check the nutrition information of foods labeled as \"low-fat\" or \"nonfat\". These foods may be higher in added sugar or refined carbs and should be avoided.  · Talk to your dietitian to identify your daily goals for nutrients listed on the label.  Shopping  · Avoid buying canned, premade, or processed foods. These foods tend to be high in fat, sodium, and added sugar.  · Shop around the outside edge of the grocery store. This includes fresh fruits and vegetables, bulk grains, fresh meats, and fresh dairy.  Cooking  · Use low-heat cooking methods, such as baking, instead of high-heat cooking methods like deep frying.  · Cook using healthy oils, such as olive, canola, or sunflower oil.  · Avoid cooking with butter, cream, or high-fat meats.  Meal planning  · Eat meals and snacks regularly, preferably at the same times every day. Avoid going long periods of time without eating.  · Eat foods high in fiber, such as fresh fruits, vegetables, beans, and whole grains. Talk to your dietitian about how many servings of carbs you can eat at each meal.  · Eat 4-6 ounces (oz) of lean protein each day, such as lean meat, chicken, fish, eggs, or tofu. One oz of lean protein is equal to:  ? 1 oz of meat, chicken, or fish.  ? 1 egg.  ? ¼ cup of tofu.  · Eat some foods each day that contain healthy fats, such as avocado, nuts, seeds, and fish.  Lifestyle  · Check your " "blood glucose regularly.  · Exercise regularly as told by your health care provider. This may include:  ? 150 minutes of moderate-intensity or vigorous-intensity exercise each week. This could be brisk walking, biking, or water aerobics.  ? Stretching and doing strength exercises, such as yoga or weightlifting, at least 2 times a week.  · Take medicines as told by your health care provider.  · Do not use any products that contain nicotine or tobacco, such as cigarettes and e-cigarettes. If you need help quitting, ask your health care provider.  · Work with a counselor or diabetes educator to identify strategies to manage stress and any emotional and social challenges.  Questions to ask a health care provider  · Do I need to meet with a diabetes educator?  · Do I need to meet with a dietitian?  · What number can I call if I have questions?  · When are the best times to check my blood glucose?  Where to find more information:  · American Diabetes Association: diabetes.org  · Academy of Nutrition and Dietetics: www.eatright.org  · National [place] of Diabetes and Digestive and Kidney Diseases (NIH): www.niddk.nih.gov  Summary  · A healthy meal plan will help you control your blood glucose and maintain a healthy lifestyle.  · Working with a diet and nutrition specialist (dietitian) can help you make a meal plan that is best for you.  · Keep in mind that carbohydrates (carbs) and alcohol have immediate effects on your blood glucose levels. It is important to count carbs and to use alcohol carefully.  This information is not intended to replace advice given to you by your health care provider. Make sure you discuss any questions you have with your health care provider.  Document Released: 09/14/2006 Document Revised: 01/14/2020 Document Reviewed: 01/22/2018  [employer] Interactive Patient Education © 2020 [employer]  DASH Eating Plan  DASH stands for \"Dietary Approaches to Stop Hypertension.\" The DASH eating plan is a " "healthy eating plan that has been shown to reduce high blood pressure (hypertension). It may also reduce your risk for type 2 diabetes, heart disease, and stroke. The DASH eating plan may also help with weight loss.  What are tips for following this plan?    General guidelines  · Avoid eating more than 2,300 mg (milligrams) of salt (sodium) a day. If you have hypertension, you may need to reduce your sodium intake to 1,500 mg a day.  · Limit alcohol intake to no more than 1 drink a day for nonpregnant women and 2 drinks a day for men. One drink equals 12 oz of beer, 5 oz of wine, or 1½ oz of hard liquor.  · Work with your health care provider to maintain a healthy body weight or to lose weight. Ask what an ideal weight is for you.  · Get at least 30 minutes of exercise that causes your heart to beat faster (aerobic exercise) most days of the week. Activities may include walking, swimming, or biking.  · Work with your health care provider or diet and nutrition specialist (dietitian) to adjust your eating plan to your individual calorie needs.  Reading food labels    · Check food labels for the amount of sodium per serving. Choose foods with less than 5 percent of the Daily Value of sodium. Generally, foods with less than 300 mg of sodium per serving fit into this eating plan.  · To find whole grains, look for the word \"whole\" as the first word in the ingredient list.  Shopping  · Buy products labeled as \"low-sodium\" or \"no salt added.\"  · Buy fresh foods. Avoid canned foods and premade or frozen meals.  Cooking  · Avoid adding salt when cooking. Use salt-free seasonings or herbs instead of table salt or sea salt. Check with your health care provider or pharmacist before using salt substitutes.  · Do not trevino foods. Cook foods using healthy methods such as baking, boiling, grilling, and broiling instead.  · Cook with heart-healthy oils, such as olive, canola, soybean, or sunflower oil.  Meal planning  · Eat a balanced " diet that includes:  ? 5 or more servings of fruits and vegetables each day. At each meal, try to fill half of your plate with fruits and vegetables.  ? Up to 6-8 servings of whole grains each day.  ? Less than 6 oz of lean meat, poultry, or fish each day. A 3-oz serving of meat is about the same size as a deck of cards. One egg equals 1 oz.  ? 2 servings of low-fat dairy each day.  ? A serving of nuts, seeds, or beans 5 times each week.  ? Heart-healthy fats. Healthy fats called Omega-3 fatty acids are found in foods such as flaxseeds and coldwater fish, like sardines, salmon, and mackerel.  · Limit how much you eat of the following:  ? Canned or prepackaged foods.  ? Food that is high in trans fat, such as fried foods.  ? Food that is high in saturated fat, such as fatty meat.  ? Sweets, desserts, sugary drinks, and other foods with added sugar.  ? Full-fat dairy products.  · Do not salt foods before eating.  · Try to eat at least 2 vegetarian meals each week.  · Eat more home-cooked food and less restaurant, buffet, and fast food.  · When eating at a restaurant, ask that your food be prepared with less salt or no salt, if possible.  What foods are recommended?  The items listed may not be a complete list. Talk with your dietitian about what dietary choices are best for you.  Grains  Whole-grain or whole-wheat bread. Whole-grain or whole-wheat pasta. Brown rice. Oatmeal. Quinoa. Bulgur. Whole-grain and low-sodium cereals. Essence bread. Low-fat, low-sodium crackers. Whole-wheat flour tortillas.  Vegetables  Fresh or frozen vegetables (raw, steamed, roasted, or grilled). Low-sodium or reduced-sodium tomato and vegetable juice. Low-sodium or reduced-sodium tomato sauce and tomato paste. Low-sodium or reduced-sodium canned vegetables.  Fruits  All fresh, dried, or frozen fruit. Canned fruit in natural juice (without added sugar).  Meat and other protein foods  Skinless chicken or turkey. Ground chicken or turkey. Pork  with fat trimmed off. Fish and seafood. Egg whites. Dried beans, peas, or lentils. Unsalted nuts, nut butters, and seeds. Unsalted canned beans. Lean cuts of beef with fat trimmed off. Low-sodium, lean deli meat.  Dairy  Low-fat (1%) or fat-free (skim) milk. Fat-free, low-fat, or reduced-fat cheeses. Nonfat, low-sodium ricotta or cottage cheese. Low-fat or nonfat yogurt. Low-fat, low-sodium cheese.  Fats and oils  Soft margarine without trans fats. Vegetable oil. Low-fat, reduced-fat, or light mayonnaise and salad dressings (reduced-sodium). Canola, safflower, olive, soybean, and sunflower oils. Avocado.  Seasoning and other foods  Herbs. Spices. Seasoning mixes without salt. Unsalted popcorn and pretzels. Fat-free sweets.  What foods are not recommended?  The items listed may not be a complete list. Talk with your dietitian about what dietary choices are best for you.  Grains  Baked goods made with fat, such as croissants, muffins, or some breads. Dry pasta or rice meal packs.  Vegetables  Creamed or fried vegetables. Vegetables in a cheese sauce. Regular canned vegetables (not low-sodium or reduced-sodium). Regular canned tomato sauce and paste (not low-sodium or reduced-sodium). Regular tomato and vegetable juice (not low-sodium or reduced-sodium). Pickles. Olives.  Fruits  Canned fruit in a light or heavy syrup. Fried fruit. Fruit in cream or butter sauce.  Meat and other protein foods  Fatty cuts of meat. Ribs. Fried meat. Bear. Sausage. Bologna and other processed lunch meats. Salami. Fatback. Hotdogs. Bratwurst. Salted nuts and seeds. Canned beans with added salt. Canned or smoked fish. Whole eggs or egg yolks. Chicken or turkey with skin.  Dairy  Whole or 2% milk, cream, and half-and-half. Whole or full-fat cream cheese. Whole-fat or sweetened yogurt. Full-fat cheese. Nondairy creamers. Whipped toppings. Processed cheese and cheese spreads.  Fats and oils  Butter. Stick margarine. Lard. Shortening. Ghee.  Bear fat. Tropical oils, such as coconut, palm kernel, or palm oil.  Seasoning and other foods  Salted popcorn and pretzels. Onion salt, garlic salt, seasoned salt, table salt, and sea salt. Worcestershire sauce. Tartar sauce. Barbecue sauce. Teriyaki sauce. Soy sauce, including reduced-sodium. Steak sauce. Canned and packaged gravies. Fish sauce. Oyster sauce. Cocktail sauce. Horseradish that you find on the shelf. Ketchup. Mustard. Meat flavorings and tenderizers. Bouillon cubes. Hot sauce and Tabasco sauce. Premade or packaged marinades. Premade or packaged taco seasonings. Relishes. Regular salad dressings.  Where to find more information:  · National Heart, Lung, and Blood [place]: www.nhlbi.nih.gov  · American Heart Association: www.heart.org  Summary  · The DASH eating plan is a healthy eating plan that has been shown to reduce high blood pressure (hypertension). It may also reduce your risk for type 2 diabetes, heart disease, and stroke.  · With the DASH eating plan, you should limit salt (sodium) intake to 2,300 mg a day. If you have hypertension, you may need to reduce your sodium intake to 1,500 mg a day.  · When on the DASH eating plan, aim to eat more fresh fruits and vegetables, whole grains, lean proteins, low-fat dairy, and heart-healthy fats.  · Work with your health care provider or diet and nutrition specialist (dietitian) to adjust your eating plan to your individual calorie needs.  This information is not intended to replace advice given to you by your health care provider. Make sure you discuss any questions you have with your health care provider.  Document Released: 12/06/2012 Document Revised: 12/11/2017 Document Reviewed: 12/11/2017  [employer] Interactive Patient Education © 2020 [employer]        Labs today will call with results.   Cont f/u with specialists as scheduled.   Cont current medications, stressed importance of taking xarelto daily.   Trial tylenol OTc as needed for knee  pain, refer to ortho will call with appt.   Apply triamcinolone cream to affected area 2x day fo 10-14 days.   Avoid driving per neurology recommendations.   If any worsening symptoms, facial drooping, slurred speech, s/s stroke CP advised ER.   Increase fluid intake, get plenty of rest.   Patient agrees with plan of care and understands instructions. Call if worsening symptoms or any problems or concerns.     Medicare Wellness  Personal Prevention Plan of Service     Date of Office Visit:  2020  Encounter Provider:  NELSON Rose  Place of Service:  [facility]  Patient Name: Doc Coyne  :  1940    As part of the Medicare Wellness portion of your visit today, we are providing you with this personalized preventive plan of services (PPPS). This plan is based upon recommendations of the [place] Preventive Services Task Force (USPSTF) and the Advisory Committee on Immunization Practices (ACIP).    This lists the preventive care services that should be considered, and provides dates of when you are due. Items listed as completed are up-to-date and do not require any further intervention.    Health Maintenance   Topic Date Due   • ZOSTER VACCINE (1 of 2) 1991   • Pneumococcal Vaccine Once at 65 Years Old  2006   • URINE MICROALBUMIN  2016   • DIABETIC FOOT EXAM  08/15/2017   • HEMOGLOBIN A1C  2020   • LIPID PANEL  10/30/2020   • DIABETIC EYE EXAM  2021   • MEDICARE ANNUAL WELLNESS  2021   • TDAP/TD VACCINES (2 - Td) 2028   • INFLUENZA VACCINE  Completed       Orders Placed This Encounter   Procedures   • Comprehensive Metabolic Panel   • Lipid Panel   • TSH   • CBC Auto Differential   • Ambulatory Referral to Orthopedic Surgery     Referral Priority:   Urgent     Referral Type:   Consultation     Referral Reason:   Specialty Services Required     Referred to Provider:   Leticia Aguirre MD     Requested  Specialty:   Orthopedic Surgery     Number of Visits Requested:   1   • CBC & Differential     Order Specific Question:   Manual Differential     Answer:   No       Return in about 3 months (around 5/27/2020), or if symptoms worsen or fail to improve, for Recheck.

## 2020-02-27 NOTE — PROGRESS NOTES
Subjective   Doc Coyne is a 79 y.o. male.     History of Present Illness   Here today to establish care, his girlfriend comes to this office, prev pcp Dr. Leonard, ricardo dewey, changing offices. He does 8 children. He does smoke 1 pack per week, working on quitting. States his diet is good. He does exercise on treadmill, bike.   With A fib, HTN, CAD, taking xarelto 20mg daily, lisinopril 30mg daily, cartia 120mg daily, sees cardiology Dr. Cade, last visit 2/7/2020 for surgery clearance for hand procedure, had recent episode off xarelto as he could not afford medication of facial asymmetry and focal weakness, he was cleared for surgery with lovenox bridging per cardiology. Has f/u 3/23/2020., he does check BP at home usually 120s/80s.   He saw neurology Dr. Timmons 2/10/2020 for episode of focal weakness and asymmetry, he admitted to facial dropping, trouble with speech, symptoms completely resolved. Girlfriend concerned about memory, trouble with driving. His girlfriend is in the lobby of the office today. MRI brain and MRA neck ordered by neurology for stroke. He had severe cognitive impairment and neurology recommended no driving. Pending mri possible neuropsych eval. He has MRI scheduled 3/2/2020 and f/u with neurology 3/10/2020.   With DM, taking metformin 500mg bid, last a1c 12/25/19, 6.5. He does not check BS At home, he states he is UTD eye exam.  With HLD, taking crestor 10mg daily. Last labs 10/19. CHOL well controlled with LDL <70, tolerating well. He is fasting today.   With gerd, taking protonix 40mg daily, sees GI, pending colonoscopy.   C/o itching, generalized does have rash on bilat arms, he noticed this about 2 days ago. Uses dove soap,  Hx of prostate cancer, saw first urology Dr. Mejia, currently in remission. Last visit about 2 weeks ago, UTD prostate exam.   C/o knee pain, pain with getting up and down, he has hx of back pain, does not take any medications. He would like  ortho referral today.   Unsure of scheduling for finger surgery, sees [facility], Dr. Carlos, he is unsure of next f/u appt.       The following portions of the patient's history were reviewed and updated as appropriate: allergies, current medications, past family history, past medical history, past social history, past surgical history and problem list.    Review of Systems   Constitutional: Negative for chills, diaphoresis and fever.   Respiratory: Negative for cough and shortness of breath.    Cardiovascular: Negative for chest pain, palpitations and leg swelling.   Musculoskeletal: Negative for arthralgias and myalgias.   Neurological: Positive for memory problem. Negative for dizziness, tremors, seizures, syncope, facial asymmetry, speech difficulty, weakness, light-headedness, numbness, headache and confusion.   All other systems reviewed and are negative.      Objective   Physical Exam   Constitutional: He is oriented to person, place, and time. He appears well-developed and well-nourished.   HENT:   Head: Normocephalic.   Eyes: Pupils are equal, round, and reactive to light.   Neck: Normal range of motion.   Cardiovascular: Normal rate, regular rhythm and normal heart sounds.   Pulses:       Radial pulses are 2+ on the right side, and 2+ on the left side.        Dorsalis pedis pulses are 2+ on the right side, and 2+ on the left side.        Posterior tibial pulses are 2+ on the right side, and 2+ on the left side.   Pulmonary/Chest: Effort normal and breath sounds normal. He has no decreased breath sounds. He has no wheezes. He has no rhonchi.   Musculoskeletal:        Right knee: He exhibits normal range of motion, no swelling and no effusion. No tenderness found.        Left knee: He exhibits normal range of motion, no swelling and no effusion. No tenderness found.   Neurological: He is alert and oriented to person, place, and time. He has normal strength. No cranial nerve deficit or sensory deficit.   Skin: Skin  is warm and dry. Rash noted. Rash is maculopapular. There is erythema.        Psychiatric: He has a normal mood and affect. His behavior is normal.   Nursing note and vitals reviewed.        Assessment/Plan   Doc was seen today for establish care, transient ischemic attack, atrial fibrillation and hypertension.    Diagnoses and all orders for this visit:    Medicare annual wellness visit, subsequent    Encounter to establish care  -     CBC & Differential  -     Comprehensive Metabolic Panel  -     Lipid Panel  -     TSH  -     CBC Auto Differential    Paroxysmal atrial fibrillation (CMS/HCC)  -     CBC & Differential  -     Comprehensive Metabolic Panel  -     Lipid Panel  -     TSH  -     CBC Auto Differential    Essential hypertension  -     CBC & Differential  -     Comprehensive Metabolic Panel  -     Lipid Panel  -     TSH  -     CBC Auto Differential    Type 2 diabetes mellitus without complication, without long-term current use of insulin (CMS/HCC)  -     CBC & Differential  -     Comprehensive Metabolic Panel  -     Lipid Panel  -     TSH  -     CBC Auto Differential    TIA (transient ischemic attack)  -     CBC & Differential  -     Comprehensive Metabolic Panel  -     Lipid Panel  -     TSH  -     CBC Auto Differential    Chronic pain of both knees  -     Ambulatory Referral to Orthopedic Surgery  -     CBC & Differential  -     Comprehensive Metabolic Panel  -     Lipid Panel  -     TSH  -     CBC Auto Differential    Dermatitis  -     CBC & Differential  -     Comprehensive Metabolic Panel  -     Lipid Panel  -     TSH  -     CBC Auto Differential    Other orders  -     triamcinolone (KENALOG) 0.1 % ointment; Apply  topically to the appropriate area as directed 2 (Two) Times a Day.    Labs today will call with results.   Cont f/u with specialists as scheduled.   Cont current medications, stressed importance of taking xarelto daily.   Trial tylenol OTc as needed for knee pain, refer to ortho will  call with appt.   Apply triamcinolone cream to affected area 2x day fo 10-14 days.   Avoid driving per neurology recommendations.   If any worsening symptoms, facial drooping, slurred speech, s/s stroke CP advised ER.   Increase fluid intake, get plenty of rest.   Patient agrees with plan of care and understands instructions. Call if worsening symptoms or any problems or concerns.

## 2020-02-27 NOTE — PROGRESS NOTES
The ABCs of the Annual Wellness Visit  Subsequent Medicare Wellness Visit    Chief Complaint   Patient presents with   • Establish Care   • Transient Ischemic Attack   • Atrial Fibrillation   • Hypertension       Subjective   History of Present Illness:  Doc Coyne is a 79 y.o. male who presents for a Subsequent Medicare Wellness Visit.    HEALTH RISK ASSESSMENT    Recent Hospitalizations:  Recently treated at the following:  [facility]    Current Medical Providers:  Patient Care Team:  Hardeep Quiroz MD as PCP - General (Hand Surgery)  Luiz Nicolas MD as PCP - Family Medicine  Joel Rosado MD as Consulting Physician (Cardiology)  Soniay Timmons MD as Consulting Physician (Neurology)    Smoking Status:  Social History     Tobacco Use   Smoking Status Current Every Day Smoker   • Packs/day: 0.50   • Types: Cigarettes   Smokeless Tobacco Never Used       Alcohol Consumption:  Social History     Substance and Sexual Activity   Alcohol Use Yes    Comment: OCCASIONALLY       Depression Screen:   PHQ-2/PHQ-9 Depression Screening 2/27/2020   Little interest or pleasure in doing things 0   Feeling down, depressed, or hopeless 0   Trouble falling or staying asleep, or sleeping too much 0   Feeling tired or having little energy 0   Poor appetite or overeating 0   Feeling bad about yourself - or that you are a failure or have let yourself or your family down 0   Trouble concentrating on things, such as reading the newspaper or watching television 0   Moving or speaking so slowly that other people could have noticed. Or the opposite - being so fidgety or restless that you have been moving around a lot more than usual 0   Thoughts that you would be better off dead, or of hurting yourself in some way 0   Total Score 0   If you checked off any problems, how difficult have these problems made it for you to do your work, take care of things at home, or get along with other  people? Not difficult at all       Fall Risk Screen:  BLAIR Fall Risk Assessment has not been completed.    Health Habits and Functional and Cognitive Screening:  Functional & Cognitive Status 2/27/2020   Do you have difficulty preparing food and eating? No   Do you have difficulty bathing yourself, getting dressed or grooming yourself? No   Do you have difficulty using the toilet? No   Do you have difficulty moving around from place to place? No   Do you have trouble with steps or getting out of a bed or a chair? No   Current Diet Well Balanced Diet   Dental Exam Up to date   Eye Exam Up to date   Exercise (times per week) 5 times per week   Current Exercise Activities Include Walking   Do you need help using the phone?  No   Are you deaf or do you have serious difficulty hearing?  No   Do you need help with transportation? Yes   Do you need help shopping? Yes   Do you need help preparing meals?  No   Do you need help with housework?  No   Do you need help with laundry? No   Do you need help taking your medications? No   Do you need help managing money? No   Do you ever drive or ride in a car without wearing a seat belt? No   Have you felt unusual stress, anger or loneliness in the last month? No   Who do you live with? Other   If you need help, do you have trouble finding someone available to you? No   Have you been bothered in the last four weeks by sexual problems? No   Do you have difficulty concentrating, remembering or making decisions? No         Does the patient have evidence of cognitive impairment? No    Asprin use counseling:Taking ASA appropriately as indicated    Age-appropriate Screening Schedule:  Refer to the list below for future screening recommendations based on patient's age, sex and/or medical conditions. Orders for these recommended tests are listed in the plan section. The patient has been provided with a written plan.    Health Maintenance   Topic Date Due   • ZOSTER VACCINE (1 of 2)  06/19/1991   • URINE MICROALBUMIN  02/08/2016   • DIABETIC FOOT EXAM  08/15/2017   • HEMOGLOBIN A1C  06/25/2020   • LIPID PANEL  10/30/2020   • DIABETIC EYE EXAM  01/06/2021   • TDAP/TD VACCINES (2 - Td) 12/09/2028   • INFLUENZA VACCINE  Completed          The following portions of the patient's history were reviewed and updated as appropriate: allergies, current medications, past family history, past medical history, past social history, past surgical history and problem list.    Outpatient Medications Prior to Visit   Medication Sig Dispense Refill   • CARTIA  MG 24 hr capsule TAKE 1 CAPSULE BY MOUTH DAILY 90 capsule 5   • enoxaparin (LOVENOX) 100 MG/ML solution syringe Inject 0.9 mL under the skin into the appropriate area as directed Every 12 (Twelve) Hours. 4 syringe 0   • lisinopril (PRINIVIL,ZESTRIL) 30 MG tablet Take 30 mg by mouth Daily.  2   • metFORMIN (GLUCOPHAGE) 500 MG tablet Take 1 tablet by mouth every 12 (twelve) hours. TAKE 1 TABLET BY MOUTH EVERY 12 HOURS WITH FOOD     • NITROSTAT 0.4 MG SL tablet Place 0.4 mg under the tongue Every 5 (Five) Minutes As Needed.  1   • nystatin (MYCOSTATIN) 196131 UNIT/ML suspension Take 5 mL by mouth 4 (Four) Times a Day. Swish and swallow 473 mL 0   • pantoprazole (PROTONIX) 40 MG EC tablet Take 1 tablet by mouth Daily. 30 tablet 0   • rosuvastatin (CRESTOR) 10 MG tablet      • XARELTO 20 MG tablet TAKE 1 TABLET BY MOUTH DAILY 30 tablet 0     No facility-administered medications prior to visit.        Patient Active Problem List   Diagnosis   • Atrial fibrillation (CMS/HCC)   • Chest pain, atypical   • Shortness of breath   • Preop cardiovascular exam   • Anticoagulated   • Atherosclerotic heart disease of native coronary artery without angina pectoris   • Hypertension   • Benign prostatic hypertrophy   • COPD (chronic obstructive pulmonary disease) (CMS/HCC)   • Type 2 diabetes mellitus, without long-term current use of insulin (CMS/HCC)   • Bradycardia   •  "Chest pain   • TIA (transient ischemic attack)   • Prostate CA (CMS/HCC)       Advanced Care Planning:  ACP discussion was held with the patient during this visit. Patient has an advance directive, copy requested.    Review of Systems    Compared to one year ago, the patient feels his physical health is better.  Compared to one year ago, the patient feels his mental health is the same.    Reviewed chart for potential of high risk medication in the elderly: yes  Reviewed chart for potential of harmful drug interactions in the elderly:yes    Objective         Vitals:    02/27/20 1113   BP: 112/68   BP Location: Left arm   Patient Position: Sitting   Cuff Size: Adult   Pulse: 57   Temp: 98.4 °F (36.9 °C)   TempSrc: Oral   SpO2: 98%   Weight: 93.9 kg (207 lb)   Height: 182.9 cm (72\")   PainSc: 0-No pain       Body mass index is 28.07 kg/m².  Discussed the patient's BMI with him. The BMI is in the acceptable range.    Physical Exam    Lab Results   Component Value Date    HGBA1C 6.50 (H) 12/25/2019        Assessment/Plan   Medicare Risks and Personalized Health Plan  CMS Preventative Services Quick Reference  Advance Directive Discussion  Dementia/Memory   Diabetic Lab Screening   Prostate Cancer Screening     The above risks/problems have been discussed with the patient.  Pertinent information has been shared with the patient in the After Visit Summary.  Follow up plans and orders are seen below in the Assessment/Plan Section.    Diagnoses and all orders for this visit:    1. Encounter to establish care (Primary)  -     CBC & Differential  -     Comprehensive Metabolic Panel  -     Lipid Panel  -     TSH    2. Paroxysmal atrial fibrillation (CMS/HCC)  -     CBC & Differential  -     Comprehensive Metabolic Panel  -     Lipid Panel  -     TSH    3. Essential hypertension  -     CBC & Differential  -     Comprehensive Metabolic Panel  -     Lipid Panel  -     TSH    4. Type 2 diabetes mellitus without complication, " without long-term current use of insulin (CMS/[facility])  -     CBC & Differential  -     Comprehensive Metabolic Panel  -     Lipid Panel  -     TSH    5. TIA (transient ischemic attack)  -     CBC & Differential  -     Comprehensive Metabolic Panel  -     Lipid Panel  -     TSH    6. Chronic pain of both knees  -     Ambulatory Referral to Orthopedic Surgery  -     CBC & Differential  -     Comprehensive Metabolic Panel  -     Lipid Panel  -     TSH    7. Dermatitis  -     CBC & Differential  -     Comprehensive Metabolic Panel  -     Lipid Panel  -     TSH    Other orders  -     triamcinolone (KENALOG) 0.1 % ointment; Apply  topically to the appropriate area as directed 2 (Two) Times a Day.  Dispense: 30 g; Refill: 0      Follow Up:  No follow-ups on file.     An After Visit Summary and PPPS were given to the patient.

## 2020-02-28 LAB — HBA1C MFR BLD: 6.8 % (ref 4.8–5.6)

## 2020-02-28 PROCEDURE — 83036 HEMOGLOBIN GLYCOSYLATED A1C: CPT | Performed by: NURSE PRACTITIONER

## 2020-03-02 ENCOUNTER — TELEPHONE (OUTPATIENT)
Dept: NEUROLOGY | Facility: CLINIC | Age: 80
End: 2020-03-02

## 2020-03-02 ENCOUNTER — HOSPITAL ENCOUNTER (OUTPATIENT)
Dept: MRI IMAGING | Facility: HOSPITAL | Age: 80
End: 2020-03-02

## 2020-03-02 ENCOUNTER — APPOINTMENT (OUTPATIENT)
Dept: MRI IMAGING | Facility: HOSPITAL | Age: 80
End: 2020-03-02

## 2020-03-02 NOTE — TELEPHONE ENCOUNTER
PATIENTS NORAH CONLEY CALLED STATING THAT [facility] IS HAVING AN MRI DONE ON 3/8/20 PER DR WEBB. THEY WANT TO KNOW IF DR WEBB CAN SEND IN A PRESCRIPTION FOR A SEDATIVE FOR CLAUSTROPHOBIA.     PATIENT NUMBER:    [phone]    PLEASE SEND TO [facility]:    [id number] [place], [address]    PHARMACY PHONE:[phone]

## 2020-03-03 NOTE — TELEPHONE ENCOUNTER
Can you run a Aguila on this patient?  Can you also contact them and see what he has tried in the past that is helped?  Other medications that he is tried in the past that did not work or he had side effects?

## 2020-03-08 ENCOUNTER — HOSPITAL ENCOUNTER (OUTPATIENT)
Dept: MRI IMAGING | Facility: HOSPITAL | Age: 80
Discharge: HOME | End: 2020-03-08

## 2020-03-08 DIAGNOSIS — G45.9 TIA (TRANSIENT ISCHEMIC ATTACK): ICD-10-CM

## 2020-03-09 ENCOUNTER — TELEPHONE (OUTPATIENT)
Dept: NEUROLOGY | Facility: CLINIC | Age: 80
End: 2020-03-09

## 2020-03-09 RX ORDER — DIAZEPAM 2 MG/1
TABLET ORAL
Qty: 2 TABLET | Refills: 0 | Status: SHIPPED | OUTPATIENT
Start: 2020-03-09 | End: 2020-11-05 | Stop reason: ALTCHOICE

## 2020-03-09 NOTE — TELEPHONE ENCOUNTER
Patient called to request anxiety medication for his upcoming MRI scheduled on 3826/2020. Patients wife asks that it please be sent to [facility] pharmacy on file if approved.

## 2020-03-09 NOTE — TELEPHONE ENCOUNTER
I spoke with patient and he informed me that he hasn't tried anything in the past. Please advise. Thank you. Aguila cervantes and uploaded.

## 2020-03-11 RX ORDER — METOPROLOL TARTRATE 50 MG/1
50 TABLET, FILM COATED ORAL DAILY
Qty: 90 TABLET | Refills: 1 | Status: SHIPPED | OUTPATIENT
Start: 2020-03-11 | End: 2020-09-30

## 2020-03-23 ENCOUNTER — HOSPITAL ENCOUNTER (OUTPATIENT)
Dept: CARDIOLOGY | Facility: HOSPITAL | Age: 80
Discharge: HOME | End: 2020-03-23
Admitting: NURSE PRACTITIONER

## 2020-03-23 ENCOUNTER — OFFICE VISIT (OUTPATIENT)
Dept: CARDIOLOGY | Facility: CLINIC | Age: 80
End: 2020-03-23

## 2020-03-23 VITALS
DIASTOLIC BLOOD PRESSURE: 83 MMHG | HEART RATE: 59 BPM | HEIGHT: 72 IN | SYSTOLIC BLOOD PRESSURE: 137 MMHG | BODY MASS INDEX: 28.44 KG/M2 | WEIGHT: 210 LBS

## 2020-03-23 VITALS
DIASTOLIC BLOOD PRESSURE: 75 MMHG | WEIGHT: 215 LBS | HEART RATE: 57 BPM | SYSTOLIC BLOOD PRESSURE: 153 MMHG | BODY MASS INDEX: 30.1 KG/M2 | HEIGHT: 71 IN

## 2020-03-23 DIAGNOSIS — I48.21 PERMANENT ATRIAL FIBRILLATION (HCC): Primary | ICD-10-CM

## 2020-03-23 DIAGNOSIS — Z79.01 ANTICOAGULATED: ICD-10-CM

## 2020-03-23 DIAGNOSIS — I10 ESSENTIAL HYPERTENSION: ICD-10-CM

## 2020-03-23 DIAGNOSIS — G45.9 TIA (TRANSIENT ISCHEMIC ATTACK): ICD-10-CM

## 2020-03-23 LAB
AORTIC DIMENSIONLESS INDEX: 0.4 (DI)
BH CV ECHO MEAS - AI DEC SLOPE: 352 CM/SEC^2
BH CV ECHO MEAS - AI MAX PG: 99.6 MMHG
BH CV ECHO MEAS - AI MAX VEL: 499 CM/SEC
BH CV ECHO MEAS - AI P1/2T: 415.2 MSEC
BH CV ECHO MEAS - AO MAX PG (FULL): 11.9 MMHG
BH CV ECHO MEAS - AO MAX PG: 14.4 MMHG
BH CV ECHO MEAS - AO MEAN PG (FULL): 7 MMHG
BH CV ECHO MEAS - AO MEAN PG: 8 MMHG
BH CV ECHO MEAS - AO V2 MAX: 190 CM/SEC
BH CV ECHO MEAS - AO V2 MEAN: 131 CM/SEC
BH CV ECHO MEAS - AO V2 VTI: 44.4 CM
BH CV ECHO MEAS - ASC AORTA: 3.5 CM
BH CV ECHO MEAS - AVA(I,A): 1.6 CM^2
BH CV ECHO MEAS - AVA(I,D): 1.6 CM^2
BH CV ECHO MEAS - AVA(V,A): 1.6 CM^2
BH CV ECHO MEAS - AVA(V,D): 1.6 CM^2
BH CV ECHO MEAS - BSA(HAYCOCK): 2.2 M^2
BH CV ECHO MEAS - BSA: 2.2 M^2
BH CV ECHO MEAS - BZI_BMI: 28.5 KILOGRAMS/M^2
BH CV ECHO MEAS - BZI_METRIC_HEIGHT: 182.9 CM
BH CV ECHO MEAS - BZI_METRIC_WEIGHT: 95.3 KG
BH CV ECHO MEAS - EDV(CUBED): 132.7 ML
BH CV ECHO MEAS - EDV(MOD-SP2): 91 ML
BH CV ECHO MEAS - EDV(MOD-SP4): 99 ML
BH CV ECHO MEAS - EDV(TEICH): 123.8 ML
BH CV ECHO MEAS - EF(CUBED): 64.8 %
BH CV ECHO MEAS - EF(MOD-BP): 50 %
BH CV ECHO MEAS - EF(MOD-SP2): 56 %
BH CV ECHO MEAS - EF(MOD-SP4): 60.6 %
BH CV ECHO MEAS - EF(TEICH): 56 %
BH CV ECHO MEAS - ESV(CUBED): 46.7 ML
BH CV ECHO MEAS - ESV(MOD-SP2): 40 ML
BH CV ECHO MEAS - ESV(MOD-SP4): 39 ML
BH CV ECHO MEAS - ESV(TEICH): 54.4 ML
BH CV ECHO MEAS - FS: 29.4 %
BH CV ECHO MEAS - IVS/LVPW: 0.9
BH CV ECHO MEAS - IVSD: 0.9 CM
BH CV ECHO MEAS - LAT PEAK E' VEL: 14 CM/SEC
BH CV ECHO MEAS - LV DIASTOLIC VOL/BSA (35-75): 45.5 ML/M^2
BH CV ECHO MEAS - LV MASS(C)D: 175.6 GRAMS
BH CV ECHO MEAS - LV MASS(C)DI: 80.7 GRAMS/M^2
BH CV ECHO MEAS - LV MAX PG: 2.5 MMHG
BH CV ECHO MEAS - LV MEAN PG: 1 MMHG
BH CV ECHO MEAS - LV SYSTOLIC VOL/BSA (12-30): 17.9 ML/M^2
BH CV ECHO MEAS - LV V1 MAX: 79.1 CM/SEC
BH CV ECHO MEAS - LV V1 MEAN: 54.7 CM/SEC
BH CV ECHO MEAS - LV V1 VTI: 18.3 CM
BH CV ECHO MEAS - LVIDD: 5.1 CM
BH CV ECHO MEAS - LVIDS: 3.6 CM
BH CV ECHO MEAS - LVLD AP2: 7 CM
BH CV ECHO MEAS - LVLD AP4: 7 CM
BH CV ECHO MEAS - LVLS AP2: 6.3 CM
BH CV ECHO MEAS - LVLS AP4: 6.1 CM
BH CV ECHO MEAS - LVOT AREA (M): 3.8 CM^2
BH CV ECHO MEAS - LVOT AREA: 3.8 CM^2
BH CV ECHO MEAS - LVOT DIAM: 2.2 CM
BH CV ECHO MEAS - LVPWD: 1 CM
BH CV ECHO MEAS - MED PEAK E' VEL: 12 CM/SEC
BH CV ECHO MEAS - MV A DUR: 0.15 SEC
BH CV ECHO MEAS - MV A MAX VEL: 87.8 CM/SEC
BH CV ECHO MEAS - MV DEC SLOPE: 469 CM/SEC^2
BH CV ECHO MEAS - MV DEC TIME: 140 SEC
BH CV ECHO MEAS - MV E MAX VEL: 103 CM/SEC
BH CV ECHO MEAS - MV E/A: 1.2
BH CV ECHO MEAS - MV MAX PG: 7 MMHG
BH CV ECHO MEAS - MV MEAN PG: 3 MMHG
BH CV ECHO MEAS - MV P1/2T MAX VEL: 147 CM/SEC
BH CV ECHO MEAS - MV P1/2T: 91.8 MSEC
BH CV ECHO MEAS - MV V2 MAX: 132 CM/SEC
BH CV ECHO MEAS - MV V2 MEAN: 74.7 CM/SEC
BH CV ECHO MEAS - MV V2 VTI: 41.2 CM
BH CV ECHO MEAS - MVA P1/2T LCG: 1.5 CM^2
BH CV ECHO MEAS - MVA(P1/2T): 2.4 CM^2
BH CV ECHO MEAS - MVA(VTI): 1.7 CM^2
BH CV ECHO MEAS - PA ACC TIME: 0.08 SEC
BH CV ECHO MEAS - PA MAX PG (FULL): 1.3 MMHG
BH CV ECHO MEAS - PA MAX PG: 3.5 MMHG
BH CV ECHO MEAS - PA PR(ACCEL): 44.4 MMHG
BH CV ECHO MEAS - PA V2 MAX: 93.7 CM/SEC
BH CV ECHO MEAS - PI END-D VEL: 135 CM/SEC
BH CV ECHO MEAS - PULM DIAS VEL: 81.4 CM/SEC
BH CV ECHO MEAS - PULM S/D: 0.64
BH CV ECHO MEAS - PULM SYS VEL: 52.3 CM/SEC
BH CV ECHO MEAS - PVA(V,A): 3.3 CM^2
BH CV ECHO MEAS - PVA(V,D): 3.3 CM^2
BH CV ECHO MEAS - QP/QS: 0.98
BH CV ECHO MEAS - RAP SYSTOLE: 8 MMHG
BH CV ECHO MEAS - RV MAX PG: 2.2 MMHG
BH CV ECHO MEAS - RV MEAN PG: 1 MMHG
BH CV ECHO MEAS - RV V1 MAX: 74.6 CM/SEC
BH CV ECHO MEAS - RV V1 MEAN: 50.9 CM/SEC
BH CV ECHO MEAS - RV V1 VTI: 16.4 CM
BH CV ECHO MEAS - RVOT AREA: 4.2 CM^2
BH CV ECHO MEAS - RVOT DIAM: 2.3 CM
BH CV ECHO MEAS - RVSP: 47 MMHG
BH CV ECHO MEAS - SI(CUBED): 39.5 ML/M^2
BH CV ECHO MEAS - SI(LVOT): 32 ML/M^2
BH CV ECHO MEAS - SI(MOD-SP2): 23.4 ML/M^2
BH CV ECHO MEAS - SI(MOD-SP4): 27.6 ML/M^2
BH CV ECHO MEAS - SI(TEICH): 31.9 ML/M^2
BH CV ECHO MEAS - SV(CUBED): 86 ML
BH CV ECHO MEAS - SV(LVOT): 69.6 ML
BH CV ECHO MEAS - SV(MOD-SP2): 51 ML
BH CV ECHO MEAS - SV(MOD-SP4): 60 ML
BH CV ECHO MEAS - SV(RVOT): 68.1 ML
BH CV ECHO MEAS - SV(TEICH): 69.4 ML
BH CV ECHO MEAS - TAPSE (>1.6): 2.2 CM
BH CV ECHO MEAS - TR MAX PG: 39 MMHG
BH CV ECHO MEAS - TR MAX VEL: 311 CM/SEC
BH CV ECHO MEASUREMENTS AVERAGE E/E' RATIO: 7.92
BH CV XLRA - RV BASE: 3.5 CM
BH CV XLRA - TDI S': 11.3 CM/SEC
LEFT ATRIUM VOLUME INDEX: 61.5 ML/M2
MAXIMAL PREDICTED HEART RATE: 142 BPM
STRESS TARGET HR: 121 BPM

## 2020-03-23 PROCEDURE — 93306 TTE W/DOPPLER COMPLETE: CPT | Performed by: INTERNAL MEDICINE

## 2020-03-23 PROCEDURE — 99213 OFFICE O/P EST LOW 20 MIN: CPT | Performed by: INTERNAL MEDICINE

## 2020-03-23 PROCEDURE — 93000 ELECTROCARDIOGRAM COMPLETE: CPT | Performed by: INTERNAL MEDICINE

## 2020-03-23 PROCEDURE — 93306 TTE W/DOPPLER COMPLETE: CPT

## 2020-03-23 NOTE — PROGRESS NOTES
Subjective:        Doc Coyne is a 79 y.o. male who here for follow up    CC  TIA IN DECEMBER HPI  79-year-old male with known history of atrial fibrillation, benign essential arterial hypertension and TIA on chronic anticoagulation here for the follow-up with no complaints of chest pain tightness heaviness or the pressure sensation     Problem List Items Addressed This Visit        Cardiovascular and Mediastinum    Atrial fibrillation (CMS/HCC) - Primary    Relevant Orders    ECG 12 Lead    Hypertension    TIA (transient ischemic attack)       Other    Anticoagulated        .    The following portions of the patient's history were reviewed and updated as appropriate: allergies, current medications, past family history, past medical history, past social history, past surgical history and problem list.    Past Medical History:   Diagnosis Date   • Atherosclerotic heart disease of native coronary artery without angina pectoris    • Atrial fibrillation (CMS/HCC)    • Benign prostatic hypertrophy    • Chest pain    • COPD (chronic obstructive pulmonary disease) (CMS/HCC)    • Diabetes mellitus (CMS/HCC)    • Difficulty walking    • Hypertension    • Peripheral neuropathy    • Prostate cancer (CMS/HCC)      reports that he has quit smoking. His smoking use included cigarettes. He smoked 0.50 packs per day. He has never used smokeless tobacco. He reports that he drinks alcohol. He reports that he does not use drugs.   Family History   Problem Relation Age of Onset   • Hypertension Father    • Heart failure Father    • Hyperlipidemia Father    • Heart disease Father    • Heart attack Father    • Heart attack Sister    • Stroke Mother    • Heart disease Mother    • Diabetes Mother    • Kidney disease Maternal Grandmother        Review of Systems  Constitutional: No wt loss, fever, fatigue  Gastrointestinal: No nausea, abdominal pain  Behavioral/Psych: No insomnia or anxiety   Cardiovascular no chest pains or  "tightness in the chest  Objective:       Physical Exam    /75   Pulse 57   Ht 180.3 cm (71\")   Wt 97.5 kg (215 lb)   BMI 29.99 kg/m²   General appearance: No acute changes   Neck: Trachea midline; NECK, supple, no thyromegaly or lymphadenopathy   Lungs: Normal size and shape, normal breath sounds, equal distribution of air, no rales and rhonchi   CV: S1-S2 regular, no murmurs, no rub, no gallop   Abdomen: Soft, non-tender; no masses , no abnormal abdominal sounds   Extremities: No deformity , normal color , no peripheral edema   Skin: Normal temperature, turgor and texture; no rash, ulcers            ECG 12 Lead  Date/Time: 3/23/2020 12:11 PM  Performed by: Joel Rosado MD  Authorized by: Joel Rosado MD   Comparison: compared with previous ECG   Similar to previous ECG  Rhythm: sinus rhythm  ST Flattening: all    Clinical impression: non-specific ECG              Echocardiogram:        Current Outpatient Medications:   •  CARTIA  MG 24 hr capsule, TAKE 1 CAPSULE BY MOUTH DAILY, Disp: 90 capsule, Rfl: 5  •  diazePAM (VALIUM) 2 MG tablet, Take one tab 60 minutes prior to test.  May take another dose after 30 minutes if needed., Disp: 2 tablet, Rfl: 0  •  enoxaparin (LOVENOX) 100 MG/ML solution syringe, Inject 0.9 mL under the skin into the appropriate area as directed Every 12 (Twelve) Hours., Disp: 4 syringe, Rfl: 0  •  lisinopril (PRINIVIL,ZESTRIL) 30 MG tablet, Take 30 mg by mouth Daily., Disp: , Rfl: 2  •  metFORMIN (GLUCOPHAGE) 500 MG tablet, Take 1 tablet by mouth 2 (Two) Times a Day With Meals. TAKE 1 TABLET BY MOUTH EVERY 12 HOURS WITH FOOD, Disp: 180 tablet, Rfl: 1  •  metoprolol tartrate (LOPRESSOR) 50 MG tablet, Take 1 tablet by mouth Daily., Disp: 90 tablet, Rfl: 1  •  NITROSTAT 0.4 MG SL tablet, Place 0.4 mg under the tongue Every 5 (Five) Minutes As Needed., Disp: , Rfl: 1  •  nystatin (MYCOSTATIN) 750998 UNIT/ML suspension, Take 5 mL by mouth 4 (Four) Times a Day. " Swish and swallow, Disp: 473 mL, Rfl: 0  •  pantoprazole (PROTONIX) 40 MG EC tablet, Take 1 tablet by mouth Daily., Disp: 30 tablet, Rfl: 0  •  rosuvastatin (CRESTOR) 10 MG tablet, , Disp: , Rfl:   •  triamcinolone (KENALOG) 0.1 % ointment, Apply  topically to the appropriate area as directed 2 (Two) Times a Day., Disp: 30 g, Rfl: 0  •  XARELTO 20 MG tablet, TAKE 1 TABLET BY MOUTH DAILY, Disp: 30 tablet, Rfl: 0   Assessment:        Patient Active Problem List   Diagnosis   • Atrial fibrillation (CMS/HCC)   • Chest pain, atypical   • Shortness of breath   • Preop cardiovascular exam   • Anticoagulated   • Atherosclerotic heart disease of native coronary artery without angina pectoris   • Hypertension   • Benign prostatic hypertrophy   • COPD (chronic obstructive pulmonary disease) (CMS/HCC)   • Type 2 diabetes mellitus, without long-term current use of insulin (CMS/HCC)   • Bradycardia   • Chest pain   • TIA (transient ischemic attack)   • Prostate CA (CMS/HCC)               Plan:            ICD-10-CM ICD-9-CM   1. Permanent atrial fibrillation I48.21 427.31   2. TIA (transient ischemic attack) G45.9 435.9   3. Essential hypertension I10 401.9   4. Anticoagulated Z79.01 V58.61     1. Permanent atrial fibrillation  Atrial fibrillation controlled  - ECG 12 Lead    2. TIA (transient ischemic attack)  No more episodes    3. Essential hypertension  Blood pressure stable    4. Anticoagulated  Counseling has been done       CV STABLE    SEE IN 1 YR  COUNSELING:    Doc Islas was given to patient for the following topics: diagnostic results, risk factor reductions, impressions, risks and benefits of treatment options and importance of treatment compliance .       SMOKING COUNSELING:    [unfilled]    Dictated using Dragon dictation

## 2020-03-26 ENCOUNTER — HOSPITAL ENCOUNTER (OUTPATIENT)
Dept: MRI IMAGING | Facility: HOSPITAL | Age: 80
End: 2020-03-26

## 2020-03-26 ENCOUNTER — APPOINTMENT (OUTPATIENT)
Dept: MRI IMAGING | Facility: HOSPITAL | Age: 80
End: 2020-03-26

## 2020-04-10 ENCOUNTER — OFFICE VISIT (OUTPATIENT)
Dept: FAMILY MEDICINE CLINIC | Facility: CLINIC | Age: 80
End: 2020-04-10

## 2020-04-10 VITALS
BODY MASS INDEX: 30.1 KG/M2 | HEIGHT: 71 IN | OXYGEN SATURATION: 98 % | TEMPERATURE: 98 F | HEART RATE: 56 BPM | DIASTOLIC BLOOD PRESSURE: 70 MMHG | WEIGHT: 215 LBS | SYSTOLIC BLOOD PRESSURE: 122 MMHG

## 2020-04-10 DIAGNOSIS — M25.562 CHRONIC PAIN OF BOTH KNEES: ICD-10-CM

## 2020-04-10 DIAGNOSIS — M25.561 CHRONIC PAIN OF BOTH KNEES: ICD-10-CM

## 2020-04-10 DIAGNOSIS — G89.29 CHRONIC PAIN OF BOTH KNEES: ICD-10-CM

## 2020-04-10 DIAGNOSIS — H61.23 BILATERAL HEARING LOSS DUE TO CERUMEN IMPACTION: Primary | ICD-10-CM

## 2020-04-10 PROCEDURE — 69209 REMOVE IMPACTED EAR WAX UNI: CPT | Performed by: NURSE PRACTITIONER

## 2020-04-10 PROCEDURE — 99213 OFFICE O/P EST LOW 20 MIN: CPT | Performed by: NURSE PRACTITIONER

## 2020-04-10 NOTE — PATIENT INSTRUCTIONS
bilat ear lavage today,   Do not put anything in ears.   Cont f/u with audiology as scheduled.   He will try tylenol OTC as needed for knee pain, encouraged rest, ice, elevation, knee brace as needed. If symptoms persist call office can consider PT or ortho when able to schedule.   Increase fluid intake, get plenty of rest.   Patient agrees with plan of care and understands instructions. Call if worsening symptoms or any problems or concerns.

## 2020-04-10 NOTE — PROGRESS NOTES
Subjective   Doc Coyne is a 79 y.o. male.     History of Present Illness   C/o ear pressure, cerumen impaction, he went to audiology for hearing test, told wax in ears and needs cleaned before he can complete hearing test. He does have ear pressure, denies ear pain or fever, he does have decreasing hearing.   He also c/o bilat knee pain, states he has had for years, comes and goes, now having stiffness when getting out of seated position, he has not tried any thing for this, denies swelling, denies pain with walking, does not see ortho.     The following portions of the patient's history were reviewed and updated as appropriate: allergies, current medications, past family history, past medical history, past social history, past surgical history and problem list.    Review of Systems   Constitutional: Negative for chills, diaphoresis and fever.   HENT: Positive for ear pain and hearing loss. Negative for rhinorrhea, sinus pressure and sore throat.    Respiratory: Negative for cough and shortness of breath.    Cardiovascular: Negative for chest pain.   Musculoskeletal: Positive for arthralgias. Negative for myalgias.   Neurological: Negative for dizziness, light-headedness and headache.   All other systems reviewed and are negative.      Objective   Physical Exam   Constitutional: He is oriented to person, place, and time. He appears well-developed and well-nourished.   HENT:   Head: Normocephalic.   Right Ear: cerumen impaction is present.  Left Ear: An impacted cerumen is present.  Nose: Nose normal.   Mouth/Throat: Uvula is midline, oropharynx is clear and moist and mucous membranes are normal.   Eyes: Pupils are equal, round, and reactive to light.   Cardiovascular: Normal rate, regular rhythm and normal heart sounds.   Pulmonary/Chest: Effort normal and breath sounds normal.   Musculoskeletal:        Right knee: He exhibits decreased range of motion. He exhibits no swelling and no effusion. No tenderness  found.        Left knee: He exhibits decreased range of motion. He exhibits no swelling and no effusion. No tenderness found.   Neurological: He is alert and oriented to person, place, and time.   Skin: Skin is warm and dry.   Psychiatric: He has a normal mood and affect. His behavior is normal.   Nursing note and vitals reviewed.    bilat TM WNL after lavage.     Assessment/Plan   Doc was seen today for cerumen impaction.    Diagnoses and all orders for this visit:    Bilateral hearing loss due to cerumen impaction      bilat ear lavage today,   Do not put anything in ears.   Cont f/u with audiology as scheduled.   He will try tylenol OTC as needed for knee pain, encouraged rest, ice, elevation, knee brace as needed. If symptoms persist call office can consider PT or ortho when able to schedule.   Increase fluid intake, get plenty of rest.   Patient agrees with plan of care and understands instructions. Call if worsening symptoms or any problems or concerns.

## 2020-06-28 ENCOUNTER — APPOINTMENT (OUTPATIENT)
Dept: MRI IMAGING | Facility: HOSPITAL | Age: 80
End: 2020-06-28

## 2020-07-09 ENCOUNTER — OFFICE VISIT (OUTPATIENT)
Dept: FAMILY MEDICINE CLINIC | Facility: CLINIC | Age: 80
End: 2020-07-09

## 2020-07-09 ENCOUNTER — TELEPHONE (OUTPATIENT)
Dept: FAMILY MEDICINE CLINIC | Facility: CLINIC | Age: 80
End: 2020-07-09

## 2020-07-09 VITALS
WEIGHT: 208 LBS | SYSTOLIC BLOOD PRESSURE: 110 MMHG | TEMPERATURE: 97 F | DIASTOLIC BLOOD PRESSURE: 60 MMHG | OXYGEN SATURATION: 98 % | HEIGHT: 71 IN | HEART RATE: 56 BPM | BODY MASS INDEX: 29.12 KG/M2

## 2020-07-09 DIAGNOSIS — I10 ESSENTIAL HYPERTENSION: ICD-10-CM

## 2020-07-09 DIAGNOSIS — M54.50 LOW BACK PAIN WITHOUT SCIATICA, UNSPECIFIED BACK PAIN LATERALITY, UNSPECIFIED CHRONICITY: ICD-10-CM

## 2020-07-09 DIAGNOSIS — M25.60 JOINT STIFFNESS: ICD-10-CM

## 2020-07-09 DIAGNOSIS — E11.9 TYPE 2 DIABETES MELLITUS WITHOUT COMPLICATION, WITHOUT LONG-TERM CURRENT USE OF INSULIN (HCC): ICD-10-CM

## 2020-07-09 DIAGNOSIS — G45.9 TIA (TRANSIENT ISCHEMIC ATTACK): ICD-10-CM

## 2020-07-09 DIAGNOSIS — M79.641 RIGHT HAND PAIN: ICD-10-CM

## 2020-07-09 DIAGNOSIS — I48.0 PAROXYSMAL ATRIAL FIBRILLATION (HCC): Primary | ICD-10-CM

## 2020-07-09 LAB
ALBUMIN SERPL-MCNC: 4.5 G/DL (ref 3.5–5.2)
ALBUMIN UR-MCNC: 0 MG/L (ref 0–20)
ALBUMIN/GLOB SERPL: 1.7 G/DL
ALP SERPL-CCNC: 43 U/L (ref 39–117)
ALT SERPL W P-5'-P-CCNC: 15 U/L (ref 1–41)
ANION GAP SERPL CALCULATED.3IONS-SCNC: 8.9 MMOL/L (ref 5–15)
AST SERPL-CCNC: 13 U/L (ref 1–40)
BILIRUB SERPL-MCNC: 0.8 MG/DL (ref 0–1.2)
BUN SERPL-MCNC: 28 MG/DL (ref 8–23)
BUN/CREAT SERPL: 25.9 (ref 7–25)
CALCIUM SPEC-SCNC: 9.6 MG/DL (ref 8.6–10.5)
CHLORIDE SERPL-SCNC: 103 MMOL/L (ref 98–107)
CHOLEST SERPL-MCNC: 102 MG/DL (ref 0–200)
CO2 SERPL-SCNC: 27.1 MMOL/L (ref 22–29)
CREAT SERPL-MCNC: 1.08 MG/DL (ref 0.76–1.27)
ERYTHROCYTE [DISTWIDTH] IN BLOOD BY AUTOMATED COUNT: 14 % (ref 12.3–15.4)
GFR SERPL CREATININE-BSD FRML MDRD: 66 ML/MIN/1.73
GLOBULIN UR ELPH-MCNC: 2.6 GM/DL
GLUCOSE SERPL-MCNC: 138 MG/DL (ref 65–99)
HBA1C MFR BLD: 7 % (ref 4.8–5.6)
HCT VFR BLD AUTO: 44 % (ref 37.5–51)
HDLC SERPL-MCNC: 39 MG/DL (ref 40–60)
HGB BLD-MCNC: 14.8 G/DL (ref 13–17.7)
LDLC SERPL CALC-MCNC: 47 MG/DL (ref 0–100)
LDLC/HDLC SERPL: 1.21 {RATIO}
LYMPHOCYTES # BLD AUTO: 2.8 10*3/MM3 (ref 0.7–3.1)
LYMPHOCYTES NFR BLD AUTO: 34.5 % (ref 19.6–45.3)
MCH RBC QN AUTO: 31 PG (ref 26.6–33)
MCHC RBC AUTO-ENTMCNC: 33.6 G/DL (ref 31.5–35.7)
MCV RBC AUTO: 92.2 FL (ref 79–97)
MONOCYTES # BLD AUTO: 0.6 10*3/MM3 (ref 0.1–0.9)
MONOCYTES NFR BLD AUTO: 6.8 % (ref 5–12)
NEUTROPHILS NFR BLD AUTO: 4.8 10*3/MM3 (ref 1.7–7)
NEUTROPHILS NFR BLD AUTO: 58.7 % (ref 42.7–76)
PLATELET # BLD AUTO: 141 10*3/MM3 (ref 140–450)
PMV BLD AUTO: 7.9 FL (ref 6–12)
POTASSIUM SERPL-SCNC: 4.6 MMOL/L (ref 3.5–5.2)
PROT SERPL-MCNC: 7.1 G/DL (ref 6–8.5)
RBC # BLD AUTO: 4.77 10*6/MM3 (ref 4.14–5.8)
SODIUM SERPL-SCNC: 139 MMOL/L (ref 136–145)
TRIGL SERPL-MCNC: 80 MG/DL (ref 0–150)
TSH SERPL DL<=0.05 MIU/L-ACNC: 1.06 UIU/ML (ref 0.27–4.2)
VLDLC SERPL-MCNC: 16 MG/DL (ref 5–40)
WBC # BLD AUTO: 8.2 10*3/MM3 (ref 3.4–10.8)

## 2020-07-09 PROCEDURE — 83036 HEMOGLOBIN GLYCOSYLATED A1C: CPT | Performed by: NURSE PRACTITIONER

## 2020-07-09 PROCEDURE — 36415 COLL VENOUS BLD VENIPUNCTURE: CPT | Performed by: NURSE PRACTITIONER

## 2020-07-09 PROCEDURE — 84443 ASSAY THYROID STIM HORMONE: CPT | Performed by: NURSE PRACTITIONER

## 2020-07-09 PROCEDURE — 99214 OFFICE O/P EST MOD 30 MIN: CPT | Performed by: NURSE PRACTITIONER

## 2020-07-09 PROCEDURE — 80061 LIPID PANEL: CPT | Performed by: NURSE PRACTITIONER

## 2020-07-09 PROCEDURE — 85025 COMPLETE CBC W/AUTO DIFF WBC: CPT | Performed by: NURSE PRACTITIONER

## 2020-07-09 PROCEDURE — 82043 UR ALBUMIN QUANTITATIVE: CPT | Performed by: NURSE PRACTITIONER

## 2020-07-09 PROCEDURE — 80053 COMPREHEN METABOLIC PANEL: CPT | Performed by: NURSE PRACTITIONER

## 2020-07-09 RX ORDER — BLOOD-GLUCOSE METER
1 EACH MISCELLANEOUS 4 TIMES DAILY
Qty: 1 KIT | Refills: 1 | Status: SHIPPED | OUTPATIENT
Start: 2020-07-09 | End: 2020-07-13 | Stop reason: SDUPTHER

## 2020-07-09 RX ORDER — LANCING DEVICE/LANCETS
1 KIT MISCELLANEOUS 4 TIMES DAILY
Qty: 1 KIT | Refills: 1 | Status: SHIPPED | OUTPATIENT
Start: 2020-07-09 | End: 2021-08-29

## 2020-07-09 NOTE — TELEPHONE ENCOUNTER
PATIENT IS CALLING IN REGARDS TO A PRESCRIPTION WRITTEN FOR A BRACE    PATIENT STATES [facility] WOULD NOT TAKE HIS PRESCRIPTION WITH PAIN ONLY. [facility] STATES IT WOULD HAVE TO BE WRITTEN DIFFERENT     PLEASE CALL PATIENT AND ADVISE    [phone]

## 2020-07-09 NOTE — PROGRESS NOTES
Subjective   Doc Coyne is a 80 y.o. male.     History of Present Illness   C/o right hand, arm pain, states he has pain at night, moves up to elbow, he does have tingling at times, he is right handed. Denies repetitive movement, does sleep on right side. Denies weakness, does have decreased . Denies any injury started about 2-3 days ago   He has trouble getting up from seated position, he stopped exercise about 6 months ago, he would like to try PT,  he has been sitting more.   Also would like letter to have a  dog at his apartment, states he is moving to a new apartment that does not allow dogs, he would like to bring your dog with him when he moves.   With DM, taking metformin 500mg bid, last a1c 6.8 2/2020, he does not check BS at home. He would like machine sent in today.   Hx of TIA, sees neurology Dr. Timmons, has upcoming MRI next week, with afib, taking xarelto 20mg daily, metoprolol 50mg daily, lisinopril 30mg daily, cartia 120mg daily, sees cardiology Dr. Cade, last visit 3/2020, denies CP, SOA, denies palpitations, denies LE edema, HA, dizziness.   With HLD, taking crestor 10mg daily, due for labs, fasting today.     The following portions of the patient's history were reviewed and updated as appropriate: allergies, current medications, past family history, past medical history, past social history, past surgical history and problem list.    Review of Systems   Constitutional: Negative for chills, diaphoresis and fever.   Respiratory: Negative for cough and shortness of breath.    Cardiovascular: Negative for chest pain.   Musculoskeletal: Positive for arthralgias, back pain and myalgias.   Neurological: Negative for dizziness, light-headedness and headache.   All other systems reviewed and are negative.      Objective   Physical Exam   Constitutional: He is oriented to person, place, and time. He appears well-developed and well-nourished.   HENT:   Head: Normocephalic.    Eyes: Pupils are equal, round, and reactive to light.   Neck: Normal range of motion.   Cardiovascular: Normal rate, regular rhythm, normal heart sounds and intact distal pulses.   Pulmonary/Chest: Effort normal and breath sounds normal.   Musculoskeletal: Normal range of motion.        Right elbow: He exhibits normal range of motion, no swelling and no effusion.        Right wrist: He exhibits normal range of motion, no tenderness, no bony tenderness and no swelling.   Neurological: He is alert and oriented to person, place, and time.   Skin: Skin is warm and dry.   Psychiatric: He has a normal mood and affect. His behavior is normal.   Nursing note and vitals reviewed.        Assessment/Plan   Doc was seen today for letter to have dog  at [facility], labs only and arm pain.    Diagnoses and all orders for this visit:    Paroxysmal atrial fibrillation (CMS/[facility])  -     Comprehensive Metabolic Panel  -     CBC & Differential  -     Lipid Panel  -     TSH  -     Hemoglobin A1c  -     MicroAlbumin, Urine, Random - Urine, Clean Catch  -     CBC Auto Differential    Essential hypertension  -     Comprehensive Metabolic Panel  -     CBC & Differential  -     Lipid Panel  -     TSH  -     Hemoglobin A1c  -     MicroAlbumin, Urine, Random - Urine, Clean Catch  -     CBC Auto Differential    TIA (transient ischemic attack)  -     Comprehensive Metabolic Panel  -     CBC & Differential  -     Lipid Panel  -     TSH  -     Hemoglobin A1c  -     MicroAlbumin, Urine, Random - Urine, Clean Catch  -     CBC Auto Differential    Type 2 diabetes mellitus without complication, without long-term current use of insulin (CMS/[facility])  -     Comprehensive Metabolic Panel  -     CBC & Differential  -     Lipid Panel  -     TSH  -     Hemoglobin A1c  -     MicroAlbumin, Urine, Random - Urine, Clean Catch  -     CBC Auto Differential    Joint stiffness  -     Ambulatory Referral to Physical Therapy Evaluate and treat    Low back  pain without sciatica, unspecified back pain laterality, unspecified chronicity  -     Ambulatory Referral to Physical Therapy Evaluate and treat    Right hand pain  -     Miscellaneous DME    Other orders  -     Blood Glucose Monitoring Suppl (ACCU-CHEK TOMAS PLUS) w/Device kit; 1 kit 4 (Four) Times a Day.  -     Lancets Misc. (ACCU-CHEK FASTCLIX LANCET) kit; 1 kit 4 (Four) Times a Day.  -     glucose blood test strip; Use as instructed    Labs today will call with results.   Refer to PT will call with appt.   DME for wrist brace, enocouraged rest, elevation, f/u if symptoms persist,   accucheck meter called into pharmacy, he will monitor BS at home call with elevated or low readings.   Cont current meds, cont f/u with specialists as scheduled.   Note for dog in apartment given.   Increase fluid intake, get plenty of rest.   Patient agrees with plan of care and understands instructions. Call if worsening symptoms or any problems or concerns.

## 2020-07-09 NOTE — PATIENT INSTRUCTIONS
Labs today will call with results.   Refer to PT will call with appt.   DME for wrist brace, enocouraged rest, elevation, f/u if symptoms persist,   accucheck meter called into pharmacy, he will monitor BS at home call with elevated or low readings.   Cont current meds, cont f/u with specialists as scheduled.   Note for dog in apartment given.   Increase fluid intake, get plenty of rest.   Patient agrees with plan of care and understands instructions. Call if worsening symptoms or any problems or concerns.

## 2020-07-10 NOTE — TELEPHONE ENCOUNTER
Patient informed can try his pharmacy or OTC to see if can get through them with no problems if not let us know well see what we can do through [place].

## 2020-07-13 RX ORDER — BLOOD-GLUCOSE METER
1 EACH MISCELLANEOUS 4 TIMES DAILY
Qty: 1 KIT | Refills: 1 | Status: SHIPPED | OUTPATIENT
Start: 2020-07-13 | End: 2021-03-01 | Stop reason: SDUPTHER

## 2020-07-14 ENCOUNTER — HOSPITAL ENCOUNTER (OUTPATIENT)
Dept: MRI IMAGING | Facility: HOSPITAL | Age: 80
Discharge: HOME | End: 2020-07-14
Admitting: PSYCHIATRY & NEUROLOGY

## 2020-07-14 ENCOUNTER — HOSPITAL ENCOUNTER (OUTPATIENT)
Dept: MRI IMAGING | Facility: HOSPITAL | Age: 80
Discharge: HOME | End: 2020-07-14

## 2020-07-14 DIAGNOSIS — G45.9 TIA (TRANSIENT ISCHEMIC ATTACK): ICD-10-CM

## 2020-07-14 LAB — CREAT BLDA-MCNC: 0.8 MG/DL (ref 0.6–1.3)

## 2020-07-14 PROCEDURE — 70549 MR ANGIOGRAPH NECK W/O&W/DYE: CPT

## 2020-07-14 PROCEDURE — 70544 MR ANGIOGRAPHY HEAD W/O DYE: CPT

## 2020-07-14 PROCEDURE — A9577 INJ MULTIHANCE: HCPCS | Performed by: PSYCHIATRY & NEUROLOGY

## 2020-07-14 PROCEDURE — 82565 ASSAY OF CREATININE: CPT

## 2020-07-14 PROCEDURE — 70553 MRI BRAIN STEM W/O & W/DYE: CPT

## 2020-07-14 PROCEDURE — 0 GADOBENATE DIMEGLUMINE 529 MG/ML SOLUTION: Performed by: PSYCHIATRY & NEUROLOGY

## 2020-07-14 RX ADMIN — GADOBENATE DIMEGLUMINE 20 ML: 529 INJECTION, SOLUTION INTRAVENOUS at 17:22

## 2020-07-16 ENCOUNTER — TELEPHONE (OUTPATIENT)
Dept: FAMILY MEDICINE CLINIC | Facility: CLINIC | Age: 80
End: 2020-07-16

## 2020-07-16 NOTE — TELEPHONE ENCOUNTER
PATIENT WAS CALLING TO SEE IF THE OFFICE HAD ANY SAMPLES OF THE XARELTO 20 MG TABLETS.     PATIENT IS NOT ABLE TO TAKE HIS DOSE TODAY BECAUSE HE IS TOTALLY OUT.     PATIENT HAS NOT ATTEMPTED TO CALL PRIOR TO TODAY TO ASK FOR SAMPLES AND IT'S TOO EXPENSIVE FOR HIM TO GET AT THE PHARMACY.      PATIENT CALL BACK NUMBER [phone] AND WOULD LIKE A CALL BACK TODAY

## 2020-07-17 ENCOUNTER — TELEPHONE (OUTPATIENT)
Dept: CARDIOLOGY | Facility: CLINIC | Age: 80
End: 2020-07-17

## 2020-07-17 NOTE — TELEPHONE ENCOUNTER
----- Message from Che Gonzalez sent at 7/16/2020 10:53 AM EDT -----  Regarding: NEEDS SAMPLES   Contact: [phone]  NEEDS XARELTO 20 MG DO WE HAVE ANY YET? PLEASE CALL

## 2020-07-23 ENCOUNTER — TELEPHONE (OUTPATIENT)
Dept: NEUROLOGY | Facility: CLINIC | Age: 80
End: 2020-07-23

## 2020-07-23 NOTE — TELEPHONE ENCOUNTER
HAVE REACHED OUT TO PATIENT SINCE MARCH, HOWEVER PATIENT DOES NOT ANSWER PHONE AND DOES NOT HAVE VM ON EITHER PHONE NUMBER LISTED.

## 2020-07-23 NOTE — TELEPHONE ENCOUNTER
----- Message from Soniya Timmons MD sent at 7/15/2020 12:40 PM EDT -----  This patient was supposed to follow-up with [facility] but looks like they no-show their appointment.  Can we get them rescheduled?  Thanks!

## 2020-08-14 ENCOUNTER — TELEPHONE (OUTPATIENT)
Dept: FAMILY MEDICINE CLINIC | Facility: CLINIC | Age: 80
End: 2020-08-14

## 2020-08-14 NOTE — TELEPHONE ENCOUNTER
PATIENT STATES: that he needs to  samples of  xareipo 20 mg     PATIENT CAN BE REACHED ON:[phone]    PHARMACY PREFERRED:[facility]/pharmacy #[id number] - [place], [place] - [address] - [phone]  - [phone] FX

## 2020-08-21 ENCOUNTER — OFFICE VISIT (OUTPATIENT)
Dept: FAMILY MEDICINE CLINIC | Facility: CLINIC | Age: 80
End: 2020-08-21

## 2020-08-21 VITALS
HEART RATE: 72 BPM | WEIGHT: 211 LBS | HEIGHT: 71 IN | DIASTOLIC BLOOD PRESSURE: 68 MMHG | SYSTOLIC BLOOD PRESSURE: 110 MMHG | TEMPERATURE: 98 F | OXYGEN SATURATION: 98 % | BODY MASS INDEX: 29.54 KG/M2

## 2020-08-21 DIAGNOSIS — G89.29 CHRONIC PAIN OF BOTH KNEES: Primary | ICD-10-CM

## 2020-08-21 DIAGNOSIS — M25.562 CHRONIC PAIN OF BOTH KNEES: Primary | ICD-10-CM

## 2020-08-21 DIAGNOSIS — R14.0 ABDOMINAL BLOATING: ICD-10-CM

## 2020-08-21 DIAGNOSIS — Z12.11 SCREEN FOR COLON CANCER: ICD-10-CM

## 2020-08-21 DIAGNOSIS — M25.561 CHRONIC PAIN OF BOTH KNEES: Primary | ICD-10-CM

## 2020-08-21 PROCEDURE — 73560 X-RAY EXAM OF KNEE 1 OR 2: CPT | Performed by: NURSE PRACTITIONER

## 2020-08-21 PROCEDURE — 99213 OFFICE O/P EST LOW 20 MIN: CPT | Performed by: NURSE PRACTITIONER

## 2020-08-21 RX ORDER — TAMSULOSIN HYDROCHLORIDE 0.4 MG/1
1 CAPSULE ORAL DAILY
COMMUNITY
End: 2021-08-29

## 2020-08-21 RX ORDER — LANCETS
EACH MISCELLANEOUS
COMMUNITY
Start: 2020-07-09 | End: 2021-03-01 | Stop reason: SDUPTHER

## 2020-08-21 NOTE — PATIENT INSTRUCTIONS
bilat knee xrays today will call with results.   May try tylenol OTC daily as needed for knee pain,   Rest, ice, elevation, low impact exercise such as walking.   Refer to PT will call with appt,   Refer to GI will call with appt.   If symptoms persist call office. Can consider ortho for knee injections if needed.   Patient agrees with plan of care and understands instructions. Call if worsening symptoms or any problems or concerns.

## 2020-08-21 NOTE — PROGRESS NOTES
"Subjective   Doc Coyne is a 80 y.o. male.     History of Present Illness   C/o leg weakness, knee pain, states legs feel heavy when he wakes up, trouble getting up from sitting position, walking 6-10 miles per day. started about 2 months ago, he does have bilat knee pain, denies hip pain. He tried exercise, states he feels like knees will buckle and fall, denies numbness or tingling, he is taking crestor, but has been on this for years. States he feels like muscles are sore. Last lipids WNL 7/2020, states he does not eat red meat. He denies hand weakness, denies confusion, slurred speech. Denies trouble with ambulation, denies CP, SOA, states he quit smoking 2 weeks ago.   Also c/o abd bloating, states abd feels \"cold\" started about 5-6 months ago, he thinks last colonoscopy 8 years ago, wondering about scope. Denies diarrhea,constipation, denies abd pain, denies hematochezia. He denies dysuria, does have urinary frequency, sees urology, now taking flomax, which has helped, denies nocturia. Sees again in about 2 weeks ago.         The following portions of the patient's history were reviewed and updated as appropriate: allergies, current medications, past family history, past medical history, past social history, past surgical history and problem list.    Review of Systems   Constitutional: Negative for chills, diaphoresis and fever.   Respiratory: Negative for cough and shortness of breath.    Cardiovascular: Negative for chest pain.   Gastrointestinal: Negative for abdominal distention, abdominal pain, blood in stool, constipation, diarrhea, nausea and vomiting.   Musculoskeletal: Positive for arthralgias and myalgias.   Neurological: Negative for dizziness, light-headedness and headache.   All other systems reviewed and are negative.      Objective   Physical Exam   Constitutional: He is oriented to person, place, and time. He appears well-developed and well-nourished.   HENT:   Head: Normocephalic. "   Eyes: Pupils are equal, round, and reactive to light.   Neck: Normal range of motion.   Cardiovascular: Normal rate, regular rhythm, normal heart sounds and intact distal pulses.   Pulmonary/Chest: Effort normal and breath sounds normal.   Abdominal: Soft. Normal appearance and bowel sounds are normal. There is no hepatosplenomegaly. There is no tenderness. There is no rigidity and no guarding.   Musculoskeletal:        Right knee: He exhibits decreased range of motion and swelling. He exhibits no effusion and no erythema. No tenderness found.        Left knee: He exhibits decreased range of motion and swelling. He exhibits no erythema. No tenderness found.   Neurological: He is alert and oriented to person, place, and time.   Skin: Skin is warm and dry.   Psychiatric: He has a normal mood and affect. His behavior is normal.   Nursing note and vitals reviewed.    bilat knee xrays today for pain, no comparison, shows NAD, arthritic changes ,awaiting radiology over read.     Assessment/Plan   Doc was seen today for lower extremity weakness.    Diagnoses and all orders for this visit:    Chronic pain of both knees  -     Ambulatory Referral to Gastroenterology  -     Ambulatory Referral to Physical Therapy Evaluate and treat    Abdominal bloating  -     XR Knee 1 or 2 View Bilateral (In Office)    Screen for colon cancer  -     XR Knee 1 or 2 View Bilateral (In Office)        bilat knee xrays today will call with results.   May try tylenol OTC daily as needed for knee pain,   Rest, ice, elevation, low impact exercise such as walking.   Refer to PT will call with appt,   Refer to GI will call with appt.   If symptoms persist call office. Can consider ortho for knee injections if needed.   Patient agrees with plan of care and understands instructions. Call if worsening symptoms or any problems or concerns.

## 2020-08-31 DIAGNOSIS — M25.561 CHRONIC PAIN OF BOTH KNEES: Primary | ICD-10-CM

## 2020-08-31 DIAGNOSIS — M25.562 CHRONIC PAIN OF BOTH KNEES: Primary | ICD-10-CM

## 2020-08-31 DIAGNOSIS — M25.462 EFFUSION OF BOTH KNEE JOINTS: ICD-10-CM

## 2020-08-31 DIAGNOSIS — G89.29 CHRONIC PAIN OF BOTH KNEES: Primary | ICD-10-CM

## 2020-08-31 DIAGNOSIS — M25.461 EFFUSION OF BOTH KNEE JOINTS: ICD-10-CM

## 2020-09-11 ENCOUNTER — OFFICE VISIT (OUTPATIENT)
Dept: ORTHOPEDIC SURGERY | Facility: CLINIC | Age: 80
End: 2020-09-11

## 2020-09-11 VITALS — TEMPERATURE: 98.3 F | WEIGHT: 210 LBS | HEIGHT: 72 IN | BODY MASS INDEX: 28.44 KG/M2

## 2020-09-11 DIAGNOSIS — M25.562 PAIN IN BOTH KNEES, UNSPECIFIED CHRONICITY: Primary | ICD-10-CM

## 2020-09-11 DIAGNOSIS — M25.561 PAIN IN BOTH KNEES, UNSPECIFIED CHRONICITY: Primary | ICD-10-CM

## 2020-09-11 DIAGNOSIS — M17.10 PRIMARY LOCALIZED OSTEOARTHROSIS OF LOWER LEG, UNSPECIFIED LATERALITY: ICD-10-CM

## 2020-09-11 PROCEDURE — 20610 DRAIN/INJ JOINT/BURSA W/O US: CPT | Performed by: ORTHOPAEDIC SURGERY

## 2020-09-11 PROCEDURE — 99204 OFFICE O/P NEW MOD 45 MIN: CPT | Performed by: ORTHOPAEDIC SURGERY

## 2020-09-11 PROCEDURE — 73562 X-RAY EXAM OF KNEE 3: CPT | Performed by: ORTHOPAEDIC SURGERY

## 2020-09-11 RX ADMIN — METHYLPREDNISOLONE ACETATE 80 MG: 80 INJECTION, SUSPENSION INTRA-ARTICULAR; INTRALESIONAL; INTRAMUSCULAR; SOFT TISSUE at 12:17

## 2020-09-21 ENCOUNTER — HOSPITAL ENCOUNTER (OUTPATIENT)
Facility: HOSPITAL | Age: 80
Setting detail: OBSERVATION
Discharge: HOME | End: 2020-09-22
Attending: EMERGENCY MEDICINE | Admitting: INTERNAL MEDICINE

## 2020-09-21 ENCOUNTER — APPOINTMENT (OUTPATIENT)
Dept: CT IMAGING | Facility: HOSPITAL | Age: 80
End: 2020-09-21

## 2020-09-21 ENCOUNTER — OFFICE VISIT (OUTPATIENT)
Dept: FAMILY MEDICINE CLINIC | Facility: CLINIC | Age: 80
End: 2020-09-21

## 2020-09-21 VITALS
TEMPERATURE: 97.7 F | BODY MASS INDEX: 27.71 KG/M2 | HEIGHT: 72 IN | WEIGHT: 204.6 LBS | SYSTOLIC BLOOD PRESSURE: 140 MMHG | OXYGEN SATURATION: 98 % | HEART RATE: 83 BPM | DIASTOLIC BLOOD PRESSURE: 60 MMHG

## 2020-09-21 DIAGNOSIS — H53.9 VISUAL CHANGES: Primary | ICD-10-CM

## 2020-09-21 DIAGNOSIS — S09.90XA INJURY OF HEAD, INITIAL ENCOUNTER: Primary | ICD-10-CM

## 2020-09-21 DIAGNOSIS — H53.9 VISUAL CHANGES: ICD-10-CM

## 2020-09-21 DIAGNOSIS — R51.9 NONINTRACTABLE HEADACHE, UNSPECIFIED CHRONICITY PATTERN, UNSPECIFIED HEADACHE TYPE: ICD-10-CM

## 2020-09-21 DIAGNOSIS — R42 DIZZINESS: ICD-10-CM

## 2020-09-21 DIAGNOSIS — Z12.11 ENCOUNTER FOR SCREENING COLONOSCOPY: ICD-10-CM

## 2020-09-21 LAB
ALBUMIN SERPL-MCNC: 4.6 G/DL (ref 3.5–5.2)
ALBUMIN/GLOB SERPL: 1.8 G/DL
ALP SERPL-CCNC: 55 U/L (ref 39–117)
ALT SERPL W P-5'-P-CCNC: 30 U/L (ref 1–41)
ANION GAP SERPL CALCULATED.3IONS-SCNC: 5.9 MMOL/L (ref 5–15)
AST SERPL-CCNC: 12 U/L (ref 1–40)
B PARAPERT DNA SPEC QL NAA+PROBE: NOT DETECTED
B PERT DNA SPEC QL NAA+PROBE: NOT DETECTED
BASOPHILS # BLD AUTO: 0.06 10*3/MM3 (ref 0–0.2)
BASOPHILS NFR BLD AUTO: 0.6 % (ref 0–1.5)
BILIRUB SERPL-MCNC: 0.8 MG/DL (ref 0–1.2)
BUN SERPL-MCNC: 24 MG/DL (ref 8–23)
BUN/CREAT SERPL: 26.7 (ref 7–25)
C PNEUM DNA NPH QL NAA+NON-PROBE: NOT DETECTED
CALCIUM SPEC-SCNC: 9.4 MG/DL (ref 8.6–10.5)
CHLORIDE SERPL-SCNC: 105 MMOL/L (ref 98–107)
CO2 SERPL-SCNC: 31.1 MMOL/L (ref 22–29)
CREAT SERPL-MCNC: 0.9 MG/DL (ref 0.76–1.27)
DEPRECATED RDW RBC AUTO: 43.9 FL (ref 37–54)
EOSINOPHIL # BLD AUTO: 0.09 10*3/MM3 (ref 0–0.4)
EOSINOPHIL NFR BLD AUTO: 0.9 % (ref 0.3–6.2)
ERYTHROCYTE [DISTWIDTH] IN BLOOD BY AUTOMATED COUNT: 13 % (ref 12.3–15.4)
FLUAV H1 2009 PAND RNA NPH QL NAA+PROBE: NOT DETECTED
FLUAV H1 HA GENE NPH QL NAA+PROBE: NOT DETECTED
FLUAV H3 RNA NPH QL NAA+PROBE: NOT DETECTED
FLUAV SUBTYP SPEC NAA+PROBE: NOT DETECTED
FLUBV RNA ISLT QL NAA+PROBE: NOT DETECTED
GFR SERPL CREATININE-BSD FRML MDRD: 81 ML/MIN/1.73
GLOBULIN UR ELPH-MCNC: 2.6 GM/DL
GLUCOSE SERPL-MCNC: 132 MG/DL (ref 65–99)
HADV DNA SPEC NAA+PROBE: NOT DETECTED
HCOV 229E RNA SPEC QL NAA+PROBE: NOT DETECTED
HCOV HKU1 RNA SPEC QL NAA+PROBE: NOT DETECTED
HCOV NL63 RNA SPEC QL NAA+PROBE: NOT DETECTED
HCOV OC43 RNA SPEC QL NAA+PROBE: NOT DETECTED
HCT VFR BLD AUTO: 47 % (ref 37.5–51)
HGB BLD-MCNC: 15.5 G/DL (ref 13–17.7)
HMPV RNA NPH QL NAA+NON-PROBE: NOT DETECTED
HPIV1 RNA SPEC QL NAA+PROBE: NOT DETECTED
HPIV2 RNA SPEC QL NAA+PROBE: NOT DETECTED
HPIV3 RNA NPH QL NAA+PROBE: NOT DETECTED
HPIV4 P GENE NPH QL NAA+PROBE: NOT DETECTED
IMM GRANULOCYTES # BLD AUTO: 0.18 10*3/MM3 (ref 0–0.05)
IMM GRANULOCYTES NFR BLD AUTO: 1.7 % (ref 0–0.5)
INR PPP: 2.23 (ref 0.9–1.1)
LYMPHOCYTES # BLD AUTO: 3.45 10*3/MM3 (ref 0.7–3.1)
LYMPHOCYTES NFR BLD AUTO: 33.5 % (ref 19.6–45.3)
M PNEUMO IGG SER IA-ACNC: NOT DETECTED
MCH RBC QN AUTO: 30.3 PG (ref 26.6–33)
MCHC RBC AUTO-ENTMCNC: 33 G/DL (ref 31.5–35.7)
MCV RBC AUTO: 91.8 FL (ref 79–97)
MONOCYTES # BLD AUTO: 0.73 10*3/MM3 (ref 0.1–0.9)
MONOCYTES NFR BLD AUTO: 7.1 % (ref 5–12)
NEUTROPHILS NFR BLD AUTO: 5.8 10*3/MM3 (ref 1.7–7)
NEUTROPHILS NFR BLD AUTO: 56.2 % (ref 42.7–76)
NRBC BLD AUTO-RTO: 0 /100 WBC (ref 0–0.2)
PLATELET # BLD AUTO: 157 10*3/MM3 (ref 140–450)
PMV BLD AUTO: 10.2 FL (ref 6–12)
POTASSIUM SERPL-SCNC: 4.5 MMOL/L (ref 3.5–5.2)
PROT SERPL-MCNC: 7.2 G/DL (ref 6–8.5)
PROTHROMBIN TIME: 24.1 SECONDS (ref 11.7–14.2)
RBC # BLD AUTO: 5.12 10*6/MM3 (ref 4.14–5.8)
RHINOVIRUS RNA SPEC NAA+PROBE: NOT DETECTED
RSV RNA NPH QL NAA+NON-PROBE: NOT DETECTED
SARS-COV-2 RNA NPH QL NAA+NON-PROBE: NOT DETECTED
SODIUM SERPL-SCNC: 142 MMOL/L (ref 136–145)
WBC # BLD AUTO: 10.31 10*3/MM3 (ref 3.4–10.8)

## 2020-09-21 PROCEDURE — 93005 ELECTROCARDIOGRAM TRACING: CPT | Performed by: INTERNAL MEDICINE

## 2020-09-21 PROCEDURE — G0378 HOSPITAL OBSERVATION PER HR: HCPCS

## 2020-09-21 PROCEDURE — 70498 CT ANGIOGRAPHY NECK: CPT

## 2020-09-21 PROCEDURE — 99214 OFFICE O/P EST MOD 30 MIN: CPT | Performed by: NURSE PRACTITIONER

## 2020-09-21 PROCEDURE — 85025 COMPLETE CBC W/AUTO DIFF WBC: CPT | Performed by: NURSE PRACTITIONER

## 2020-09-21 PROCEDURE — 0 IOPAMIDOL PER 1 ML: Performed by: EMERGENCY MEDICINE

## 2020-09-21 PROCEDURE — 70496 CT ANGIOGRAPHY HEAD: CPT

## 2020-09-21 PROCEDURE — 93010 ELECTROCARDIOGRAM REPORT: CPT | Performed by: INTERNAL MEDICINE

## 2020-09-21 PROCEDURE — 85610 PROTHROMBIN TIME: CPT | Performed by: NURSE PRACTITIONER

## 2020-09-21 PROCEDURE — 99285 EMERGENCY DEPT VISIT HI MDM: CPT

## 2020-09-21 PROCEDURE — 70450 CT HEAD/BRAIN W/O DYE: CPT

## 2020-09-21 PROCEDURE — 0202U NFCT DS 22 TRGT SARS-COV-2: CPT | Performed by: NURSE PRACTITIONER

## 2020-09-21 PROCEDURE — 80053 COMPREHEN METABOLIC PANEL: CPT | Performed by: NURSE PRACTITIONER

## 2020-09-21 RX ORDER — SODIUM CHLORIDE 0.9 % (FLUSH) 0.9 %
10 SYRINGE (ML) INJECTION EVERY 12 HOURS SCHEDULED
Status: DISCONTINUED | OUTPATIENT
Start: 2020-09-21 | End: 2020-09-22 | Stop reason: HOSPADM

## 2020-09-21 RX ORDER — CETIRIZINE HYDROCHLORIDE 10 MG/1
5 TABLET ORAL DAILY
Status: DISCONTINUED | OUTPATIENT
Start: 2020-09-22 | End: 2020-09-22 | Stop reason: HOSPADM

## 2020-09-21 RX ORDER — PANTOPRAZOLE SODIUM 40 MG/1
40 TABLET, DELAYED RELEASE ORAL DAILY
Status: DISCONTINUED | OUTPATIENT
Start: 2020-09-22 | End: 2020-09-22 | Stop reason: HOSPADM

## 2020-09-21 RX ORDER — TAMSULOSIN HYDROCHLORIDE 0.4 MG/1
0.4 CAPSULE ORAL DAILY
Status: DISCONTINUED | OUTPATIENT
Start: 2020-09-22 | End: 2020-09-22 | Stop reason: HOSPADM

## 2020-09-21 RX ORDER — SODIUM CHLORIDE 0.9 % (FLUSH) 0.9 %
10 SYRINGE (ML) INJECTION AS NEEDED
Status: DISCONTINUED | OUTPATIENT
Start: 2020-09-21 | End: 2020-09-22 | Stop reason: HOSPADM

## 2020-09-21 RX ORDER — METOPROLOL TARTRATE 50 MG/1
50 TABLET, FILM COATED ORAL DAILY
Status: DISCONTINUED | OUTPATIENT
Start: 2020-09-22 | End: 2020-09-22 | Stop reason: HOSPADM

## 2020-09-21 RX ORDER — ATORVASTATIN CALCIUM 80 MG/1
80 TABLET, FILM COATED ORAL NIGHTLY
Status: DISCONTINUED | OUTPATIENT
Start: 2020-09-21 | End: 2020-09-22 | Stop reason: HOSPADM

## 2020-09-21 RX ORDER — ASPIRIN 325 MG
325 TABLET ORAL DAILY
Status: DISCONTINUED | OUTPATIENT
Start: 2020-09-22 | End: 2020-09-22 | Stop reason: HOSPADM

## 2020-09-21 RX ORDER — ASPIRIN 300 MG/1
300 SUPPOSITORY RECTAL DAILY
Status: DISCONTINUED | OUTPATIENT
Start: 2020-09-22 | End: 2020-09-22 | Stop reason: HOSPADM

## 2020-09-21 RX ORDER — ACETAMINOPHEN 325 MG/1
650 TABLET ORAL EVERY 4 HOURS PRN
Status: DISCONTINUED | OUTPATIENT
Start: 2020-09-21 | End: 2020-09-22 | Stop reason: HOSPADM

## 2020-09-21 RX ORDER — ONDANSETRON 2 MG/ML
4 INJECTION INTRAMUSCULAR; INTRAVENOUS EVERY 6 HOURS PRN
Status: DISCONTINUED | OUTPATIENT
Start: 2020-09-21 | End: 2020-09-22 | Stop reason: HOSPADM

## 2020-09-21 RX ORDER — INFLUENZA A VIRUS A/MICHIGAN/45/2015 X-275 (H1N1) ANTIGEN (FORMALDEHYDE INACTIVATED), INFLUENZA A VIRUS A/SINGAPORE/INFIMH-16-0019/2016 IVR-186 (H3N2) ANTIGEN (FORMALDEHYDE INACTIVATED), INFLUENZA B VIRUS B/PHUKET/3073/2013 ANTIGEN (FORMALDEHYDE INACTIVATED), AND INFLUENZA B VIRUS B/MARYLAND/15/2016 BX-69A ANTIGEN (FORMALDEHYDE INACTIVATED) 60; 60; 60; 60 UG/.7ML; UG/.7ML; UG/.7ML; UG/.7ML
INJECTION, SUSPENSION INTRAMUSCULAR
COMMUNITY
Start: 2020-08-25 | End: 2021-08-29

## 2020-09-21 RX ORDER — DOCUSATE SODIUM 100 MG/1
100 CAPSULE, LIQUID FILLED ORAL 2 TIMES DAILY PRN
Status: DISCONTINUED | OUTPATIENT
Start: 2020-09-21 | End: 2020-09-22 | Stop reason: HOSPADM

## 2020-09-21 RX ORDER — DILTIAZEM HYDROCHLORIDE 120 MG/1
120 CAPSULE, COATED, EXTENDED RELEASE ORAL DAILY
Status: DISCONTINUED | OUTPATIENT
Start: 2020-09-22 | End: 2020-09-22 | Stop reason: HOSPADM

## 2020-09-21 RX ADMIN — ATORVASTATIN CALCIUM 80 MG: 80 TABLET, FILM COATED ORAL at 21:38

## 2020-09-21 RX ADMIN — IOPAMIDOL 95 ML: 755 INJECTION, SOLUTION INTRAVENOUS at 22:33

## 2020-09-21 NOTE — PROGRESS NOTES
"Subjective   Doc Coyne is a 80 y.o. male.     History of Present Illness   C/o eye \"spasms\" states he hit head on a glass door about 2 months ago, he has had eye spasms since then. States he slid on [employer] floor forward, slid into [employer] glass door way, states he hit head and fell to floor, denies syncope, states he did have bruising, he denies eye pain, denies blurred vision, states he feels like eye is burning, states he sees flash behind the eyes at times. He does not see eye doctor, he denies HA, does have some dizziness. States eye pain feels like a sting. Does have photophobia at times. He does take xarelto, hx of TIA, sees neurology Dr. Timmons.       The following portions of the patient's history were reviewed and updated as appropriate: allergies, current medications, past family history, past medical history, past social history, past surgical history and problem list.    Review of Systems   Constitutional: Negative for chills, diaphoresis and fever.   Eyes: Positive for blurred vision, photophobia and visual disturbance. Negative for pain, discharge and itching.   Respiratory: Negative for cough and shortness of breath.    Cardiovascular: Negative for chest pain.   Musculoskeletal: Negative for arthralgias and myalgias.   Neurological: Positive for dizziness. Negative for light-headedness and headache.   All other systems reviewed and are negative.      Objective   Physical Exam  Vitals signs and nursing note reviewed.   Constitutional:       Appearance: He is well-developed.   HENT:      Head: Normocephalic.   Eyes:      Pupils: Pupils are equal, round, and reactive to light.   Cardiovascular:      Rate and Rhythm: Normal rate and regular rhythm.      Heart sounds: Normal heart sounds.   Pulmonary:      Effort: Pulmonary effort is normal.      Breath sounds: Normal breath sounds.   Skin:     General: Skin is warm and dry.   Neurological:      Mental Status: He is alert and oriented to person, " place, and time.      Cranial Nerves: Cranial nerves are intact.      Sensory: Sensation is intact.      Motor: Motor function is intact.      Coordination: Coordination is intact.      Gait: Gait is intact.   Psychiatric:         Behavior: Behavior normal.         Judgment: Judgment normal.           Assessment/Plan   Doc was seen today for hit glass door 2 month ago eye spasms.    Diagnoses and all orders for this visit:    Injury of head, initial encounter    Visual changes    Dizziness    Encounter for screening colonoscopy  -     Ambulatory Referral For Screening Colonoscopy        Patient sent to ER for further eval at this time, report [facility] to [facility], patient advised to go straight to Er at this time, deferred ambulance.   List of eye doctors given he will call for appt.   If any worsening symptoms, dizziness advised ER>   Increase fluid intake, get plenty of rest.   Patient agrees with plan of care and understands instructions. Call if worsening symptoms or any problems or concerns.

## 2020-09-21 NOTE — ED TRIAGE NOTES
"Pt had a fall two months ago. Since then pt states \"I have had weird sensations behind my eyes\". Pt describes it has flashing lights. Pt states it makes him lightheaded. Pt was sent here from Dr Shannan Aguirre office for possible MRI. Pt has not been seen opthalmology yet. Pt is alert and oriented x 4. NAD. Pt masked at triage.   "

## 2020-09-21 NOTE — PATIENT INSTRUCTIONS
Patient sent to ER for further eval at this time, report called to [facility] ER, patient advised to go straight to Er at this time, deferred ambulance.   List of eye doctors given he will call for appt.   If any worsening symptoms, dizziness advised ER>   Increase fluid intake, get plenty of rest.   Patient agrees with plan of care and understands instructions. Call if worsening symptoms or any problems or concerns.

## 2020-09-22 ENCOUNTER — READMISSION MANAGEMENT (OUTPATIENT)
Dept: CALL CENTER | Facility: HOSPITAL | Age: 80
End: 2020-09-22

## 2020-09-22 ENCOUNTER — APPOINTMENT (OUTPATIENT)
Dept: MRI IMAGING | Facility: HOSPITAL | Age: 80
End: 2020-09-22

## 2020-09-22 ENCOUNTER — APPOINTMENT (OUTPATIENT)
Dept: CARDIOLOGY | Facility: HOSPITAL | Age: 80
End: 2020-09-22

## 2020-09-22 VITALS
RESPIRATION RATE: 16 BRPM | HEIGHT: 73 IN | DIASTOLIC BLOOD PRESSURE: 59 MMHG | SYSTOLIC BLOOD PRESSURE: 112 MMHG | BODY MASS INDEX: 26.88 KG/M2 | OXYGEN SATURATION: 98 % | TEMPERATURE: 98 F | HEART RATE: 56 BPM | WEIGHT: 202.82 LBS

## 2020-09-22 LAB
ALBUMIN SERPL-MCNC: 4.8 G/DL (ref 3.5–5.2)
ALBUMIN/GLOB SERPL: 2.1 G/DL
ALP SERPL-CCNC: 56 U/L (ref 39–117)
ALT SERPL W P-5'-P-CCNC: 28 U/L (ref 1–41)
ANION GAP SERPL CALCULATED.3IONS-SCNC: 9 MMOL/L (ref 5–15)
AORTIC DIMENSIONLESS INDEX: 0.5 (DI)
AST SERPL-CCNC: 11 U/L (ref 1–40)
BH CV ECHO MEAS - AI DEC SLOPE: 171.9 CM/SEC^2
BH CV ECHO MEAS - AI MAX PG: 66.5 MMHG
BH CV ECHO MEAS - AI MAX VEL: 407.7 CM/SEC
BH CV ECHO MEAS - AI P1/2T: 694.8 MSEC
BH CV ECHO MEAS - AO MAX PG (FULL): 6.3 MMHG
BH CV ECHO MEAS - AO MAX PG: 8.4 MMHG
BH CV ECHO MEAS - AO MEAN PG (FULL): 4 MMHG
BH CV ECHO MEAS - AO MEAN PG: 5.1 MMHG
BH CV ECHO MEAS - AO ROOT AREA (BSA CORRECTED): 1.7
BH CV ECHO MEAS - AO ROOT AREA: 10.9 CM^2
BH CV ECHO MEAS - AO ROOT DIAM: 3.7 CM
BH CV ECHO MEAS - AO V2 MAX: 144.6 CM/SEC
BH CV ECHO MEAS - AO V2 MEAN: 109.3 CM/SEC
BH CV ECHO MEAS - AO V2 VTI: 31.2 CM
BH CV ECHO MEAS - ASC AORTA: 3.7 CM
BH CV ECHO MEAS - AVA(I,A): 1.7 CM^2
BH CV ECHO MEAS - AVA(I,D): 1.7 CM^2
BH CV ECHO MEAS - AVA(V,A): 1.8 CM^2
BH CV ECHO MEAS - AVA(V,D): 1.8 CM^2
BH CV ECHO MEAS - BSA(HAYCOCK): 2.2 M^2
BH CV ECHO MEAS - BSA: 2.2 M^2
BH CV ECHO MEAS - BZI_BMI: 26.8 KILOGRAMS/M^2
BH CV ECHO MEAS - BZI_METRIC_HEIGHT: 185.4 CM
BH CV ECHO MEAS - BZI_METRIC_WEIGHT: 92.1 KG
BH CV ECHO MEAS - EDV(CUBED): 154.9 ML
BH CV ECHO MEAS - EDV(MOD-SP2): 103 ML
BH CV ECHO MEAS - EDV(MOD-SP4): 133 ML
BH CV ECHO MEAS - EDV(TEICH): 139.6 ML
BH CV ECHO MEAS - EF(CUBED): 54.9 %
BH CV ECHO MEAS - EF(MOD-BP): 45 %
BH CV ECHO MEAS - EF(MOD-SP2): 52.4 %
BH CV ECHO MEAS - EF(MOD-SP4): 53.4 %
BH CV ECHO MEAS - EF(TEICH): 46.2 %
BH CV ECHO MEAS - ESV(CUBED): 69.9 ML
BH CV ECHO MEAS - ESV(MOD-SP2): 49 ML
BH CV ECHO MEAS - ESV(MOD-SP4): 62 ML
BH CV ECHO MEAS - ESV(TEICH): 75.1 ML
BH CV ECHO MEAS - FS: 23.3 %
BH CV ECHO MEAS - IVS/LVPW: 1.3
BH CV ECHO MEAS - IVSD: 1.3 CM
BH CV ECHO MEAS - LAT PEAK E' VEL: 9.7 CM/SEC
BH CV ECHO MEAS - LV DIASTOLIC VOL/BSA (35-75): 61.4 ML/M^2
BH CV ECHO MEAS - LV MASS(C)D: 244.7 GRAMS
BH CV ECHO MEAS - LV MASS(C)DI: 113 GRAMS/M^2
BH CV ECHO MEAS - LV MAX PG: 2 MMHG
BH CV ECHO MEAS - LV MEAN PG: 1.1 MMHG
BH CV ECHO MEAS - LV SYSTOLIC VOL/BSA (12-30): 28.6 ML/M^2
BH CV ECHO MEAS - LV V1 MAX: 71.3 CM/SEC
BH CV ECHO MEAS - LV V1 MEAN: 48.5 CM/SEC
BH CV ECHO MEAS - LV V1 VTI: 15 CM
BH CV ECHO MEAS - LVIDD: 5.4 CM
BH CV ECHO MEAS - LVIDS: 4.1 CM
BH CV ECHO MEAS - LVLD AP2: 7.7 CM
BH CV ECHO MEAS - LVLD AP4: 7.6 CM
BH CV ECHO MEAS - LVLS AP2: 6.3 CM
BH CV ECHO MEAS - LVLS AP4: 6.7 CM
BH CV ECHO MEAS - LVOT AREA (M): 3.5 CM^2
BH CV ECHO MEAS - LVOT AREA: 3.6 CM^2
BH CV ECHO MEAS - LVOT DIAM: 2.1 CM
BH CV ECHO MEAS - LVPWD: 0.98 CM
BH CV ECHO MEAS - MR MAX PG: 139.5 MMHG
BH CV ECHO MEAS - MR MAX VEL: 590.6 CM/SEC
BH CV ECHO MEAS - MR MEAN PG: 76.5 MMHG
BH CV ECHO MEAS - MR MEAN VEL: 409.8 CM/SEC
BH CV ECHO MEAS - MR VTI: 200.2 CM
BH CV ECHO MEAS - MV DEC SLOPE: 455.9 CM/SEC^2
BH CV ECHO MEAS - MV MAX PG: 3.7 MMHG
BH CV ECHO MEAS - MV MEAN PG: 1.1 MMHG
BH CV ECHO MEAS - MV P1/2T MAX VEL: 101.8 CM/SEC
BH CV ECHO MEAS - MV P1/2T: 65.4 MSEC
BH CV ECHO MEAS - MV V2 MAX: 95.7 CM/SEC
BH CV ECHO MEAS - MV V2 MEAN: 47.9 CM/SEC
BH CV ECHO MEAS - MV V2 VTI: 27.4 CM
BH CV ECHO MEAS - MVA P1/2T LCG: 2.2 CM^2
BH CV ECHO MEAS - MVA(P1/2T): 3.4 CM^2
BH CV ECHO MEAS - MVA(VTI): 2 CM^2
BH CV ECHO MEAS - SI(AO): 156.2 ML/M^2
BH CV ECHO MEAS - SI(CUBED): 39.3 ML/M^2
BH CV ECHO MEAS - SI(LVOT): 25.1 ML/M^2
BH CV ECHO MEAS - SI(MOD-SP2): 24.9 ML/M^2
BH CV ECHO MEAS - SI(MOD-SP4): 32.8 ML/M^2
BH CV ECHO MEAS - SI(TEICH): 29.8 ML/M^2
BH CV ECHO MEAS - SV(AO): 338.3 ML
BH CV ECHO MEAS - SV(CUBED): 85 ML
BH CV ECHO MEAS - SV(LVOT): 54.3 ML
BH CV ECHO MEAS - SV(MOD-SP2): 54 ML
BH CV ECHO MEAS - SV(MOD-SP4): 71 ML
BH CV ECHO MEAS - SV(TEICH): 64.5 ML
BH CV ECHO MEAS - TAPSE (>1.6): 2.1 CM
BH CV ECHO MEAS - TR MAX VEL: 183.5 CM/SEC
BH CV XLRA - RV BASE: 4.3 CM
BH CV XLRA - RV LENGTH: 6.1 CM
BH CV XLRA - RV MID: 2.8 CM
BILIRUB SERPL-MCNC: 1 MG/DL (ref 0–1.2)
BUN SERPL-MCNC: 21 MG/DL (ref 8–23)
BUN/CREAT SERPL: 24.4 (ref 7–25)
CALCIUM SPEC-SCNC: 9.3 MG/DL (ref 8.6–10.5)
CHLORIDE SERPL-SCNC: 102 MMOL/L (ref 98–107)
CHOLEST SERPL-MCNC: 106 MG/DL (ref 0–200)
CO2 SERPL-SCNC: 31 MMOL/L (ref 22–29)
CREAT SERPL-MCNC: 0.86 MG/DL (ref 0.76–1.27)
DEPRECATED RDW RBC AUTO: 44.3 FL (ref 37–54)
ERYTHROCYTE [DISTWIDTH] IN BLOOD BY AUTOMATED COUNT: 13 % (ref 12.3–15.4)
GFR SERPL CREATININE-BSD FRML MDRD: 86 ML/MIN/1.73
GLOBULIN UR ELPH-MCNC: 2.3 GM/DL
GLUCOSE BLDC GLUCOMTR-MCNC: 130 MG/DL (ref 70–130)
GLUCOSE BLDC GLUCOMTR-MCNC: 154 MG/DL (ref 70–130)
GLUCOSE BLDC GLUCOMTR-MCNC: 168 MG/DL (ref 70–130)
GLUCOSE BLDC GLUCOMTR-MCNC: 176 MG/DL (ref 70–130)
GLUCOSE SERPL-MCNC: 157 MG/DL (ref 65–99)
HBA1C MFR BLD: 6.1 % (ref 4.8–5.6)
HCT VFR BLD AUTO: 48.2 % (ref 37.5–51)
HDLC SERPL-MCNC: 42 MG/DL (ref 40–60)
HGB BLD-MCNC: 15.8 G/DL (ref 13–17.7)
LDLC SERPL CALC-MCNC: 46 MG/DL (ref 0–100)
LDLC/HDLC SERPL: 1.09 {RATIO}
LEFT ATRIUM VOLUME INDEX: 57 ML/M2
MCH RBC QN AUTO: 30.6 PG (ref 26.6–33)
MCHC RBC AUTO-ENTMCNC: 32.8 G/DL (ref 31.5–35.7)
MCV RBC AUTO: 93.4 FL (ref 79–97)
PLATELET # BLD AUTO: 156 10*3/MM3 (ref 140–450)
PMV BLD AUTO: 10.4 FL (ref 6–12)
POTASSIUM SERPL-SCNC: 4.8 MMOL/L (ref 3.5–5.2)
PROT SERPL-MCNC: 7.1 G/DL (ref 6–8.5)
RBC # BLD AUTO: 5.16 10*6/MM3 (ref 4.14–5.8)
SODIUM SERPL-SCNC: 142 MMOL/L (ref 136–145)
TRIGL SERPL-MCNC: 91 MG/DL (ref 0–150)
VLDLC SERPL-MCNC: 18.2 MG/DL
WBC # BLD AUTO: 10.33 10*3/MM3 (ref 3.4–10.8)

## 2020-09-22 PROCEDURE — 85027 COMPLETE CBC AUTOMATED: CPT | Performed by: NURSE PRACTITIONER

## 2020-09-22 PROCEDURE — 93306 TTE W/DOPPLER COMPLETE: CPT

## 2020-09-22 PROCEDURE — 82962 GLUCOSE BLOOD TEST: CPT

## 2020-09-22 PROCEDURE — 83036 HEMOGLOBIN GLYCOSYLATED A1C: CPT | Performed by: NURSE PRACTITIONER

## 2020-09-22 PROCEDURE — 97162 PT EVAL MOD COMPLEX 30 MIN: CPT

## 2020-09-22 PROCEDURE — 99214 OFFICE O/P EST MOD 30 MIN: CPT | Performed by: PSYCHIATRY & NEUROLOGY

## 2020-09-22 PROCEDURE — G0378 HOSPITAL OBSERVATION PER HR: HCPCS

## 2020-09-22 PROCEDURE — 93306 TTE W/DOPPLER COMPLETE: CPT | Performed by: INTERNAL MEDICINE

## 2020-09-22 PROCEDURE — 80053 COMPREHEN METABOLIC PANEL: CPT | Performed by: NURSE PRACTITIONER

## 2020-09-22 PROCEDURE — 25010000002 PERFLUTREN (DEFINITY) 8.476 MG IN SODIUM CHLORIDE 0.9 % 10 ML INJECTION: Performed by: NURSE PRACTITIONER

## 2020-09-22 PROCEDURE — 80061 LIPID PANEL: CPT | Performed by: NURSE PRACTITIONER

## 2020-09-22 PROCEDURE — 97110 THERAPEUTIC EXERCISES: CPT

## 2020-09-22 PROCEDURE — 70551 MRI BRAIN STEM W/O DYE: CPT

## 2020-09-22 RX ORDER — AMITRIPTYLINE HYDROCHLORIDE 25 MG/1
25 TABLET, FILM COATED ORAL NIGHTLY
Qty: 30 TABLET | Refills: 0 | Status: SHIPPED | OUTPATIENT
Start: 2020-09-22 | End: 2020-11-05 | Stop reason: ALTCHOICE

## 2020-09-22 RX ORDER — AMITRIPTYLINE HYDROCHLORIDE 25 MG/1
25 TABLET, FILM COATED ORAL NIGHTLY
Status: DISCONTINUED | OUTPATIENT
Start: 2020-09-22 | End: 2020-09-22 | Stop reason: HOSPADM

## 2020-09-22 RX ADMIN — SODIUM CHLORIDE, PRESERVATIVE FREE 10 ML: 5 INJECTION INTRAVENOUS at 09:41

## 2020-09-22 RX ADMIN — PANTOPRAZOLE SODIUM 40 MG: 40 TABLET, DELAYED RELEASE ORAL at 09:38

## 2020-09-22 RX ADMIN — PERFLUTREN 2 ML: 6.52 INJECTION, SUSPENSION INTRAVENOUS at 12:40

## 2020-09-22 RX ADMIN — METOPROLOL TARTRATE 50 MG: 50 TABLET, FILM COATED ORAL at 09:38

## 2020-09-22 RX ADMIN — CETIRIZINE HYDROCHLORIDE 5 MG: 10 TABLET, FILM COATED ORAL at 09:37

## 2020-09-22 RX ADMIN — ASPIRIN 325 MG: 325 TABLET ORAL at 09:37

## 2020-09-22 RX ADMIN — TAMSULOSIN HYDROCHLORIDE 0.4 MG: 0.4 CAPSULE ORAL at 09:38

## 2020-09-22 RX ADMIN — DILTIAZEM HYDROCHLORIDE 120 MG: 120 CAPSULE, COATED, EXTENDED RELEASE ORAL at 09:39

## 2020-09-22 NOTE — SIGNIFICANT NOTE
09/22/20 0904   OTHER   Discipline speech language pathologist   Rehab Time/Intention   Session Not Performed other (see comments)  (SLP consult received for stroke workup. Patient passed swallow screen, CT negative. Eval not warranted at this point.SLP to sign off.)

## 2020-09-22 NOTE — ED NOTES
"Pt given MRI screening sheet. This RN went back to check on pt who says, \"I don't want to fill anymore of the sheet out. I will do it tomorrow when my girlfriend is here.\"       Vidhya Szymanski, RHINA  09/21/20 0634    "

## 2020-09-22 NOTE — DISCHARGE SUMMARY
"Date of Admission: 9/21/2020  Date of Discharge:  9/22/2020  Primary Care Physician: Shannan Aguirre, APRN     Discharge Diagnosis:  Active Hospital Problems    Diagnosis  POA   • **Visual changes [H53.9]  Yes   • Hypertension [I10]  Yes   • Benign prostatic hyperplasia [N40.0]  Yes   • COPD (chronic obstructive pulmonary disease) (CMS/[facility]) [J44.9]  Yes   • Type 2 diabetes mellitus, without long-term current use of insulin (CMS/[facility]) [E11.9]  Yes   • Atrial fibrillation (CMS/[facility]) [I48.91]  Yes      Resolved Hospital Problems   No resolved problems to display.       Presenting Problem/History of Present Illness:  Visual changes [H53.9]  Nonintractable headache, unspecified chronicity pattern, unspecified headache type [R51]     Hospital Course:  The patient is a 80 y.o. male with a history of chronic afib on xarelto, type 2 DM, HTN, and BPH who presented with intermittent vision changes and \"electrical-like\" sensations in the frontal area of the head after he struck his face on a glass door about 2 weeks ago.  He had another episode last night and came to the ER to get this checked out. Please see admission H&P from 9/21/20 for further details. He underwent CTA of the head and neck and MRI which were negative. His symptoms had resolved. 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 They recommended elavil 25mg nightly for the time being. He is very anxious to go home and I think this is reasonable. He should follow up with his PCP in a week or two.    Exam Today:  Blood pressure 112/59, pulse 56, temperature 98 °F (36.7 °C), temperature source Oral, resp. rate 16, height 185 cm (72.84\"), weight 92 kg (202 lb 13.2 oz), SpO2 98 %.  Vitals signs and nursing note reviewed.   Constitutional:       General: He is not in acute distress.  HENT:      Head: Normocephalic and atraumatic.   Eyes:      Extraocular Movements: Extraocular movements intact.      " Conjunctiva/sclera: Conjunctivae normal.      Pupils: Pupils are equal, round, and reactive to light.   Neck:      Musculoskeletal: Normal range of motion. No muscular tenderness.   Cardiovascular:      Rate and Rhythm: Normal rate.  Irregularly irregular rhythm.     Pulses: Normal pulses.   Pulmonary:      Effort: Pulmonary effort is normal.      Breath sounds: No wheezing or rales.   Abdominal:      General: There is no distension.      Palpations: Abdomen is soft.      Tenderness: There is no abdominal tenderness. There is no guarding or rebound.   Musculoskeletal: Normal range of motion.         General: No swelling.   Skin:     General: Skin is warm and dry.   Neurological:      Mental Status: He is alert.      Cranial Nerves: No cranial nerve deficit.   Psychiatric:         Mood and Affect: Mood normal.         Behavior: Behavior normal.     Procedures Performed:  STAT MRI OF THE BRAIN WITHOUT CONTRAST 09/22/2020   CLINICAL HISTORY: Patient hit head 2 months ago on door and fell  complains of blurry vision flashing lights behind both eyes prior  history of stroke in 2019.     TECHNIQUE: Axial T1, FLAIR, fat-suppressed T2, axial diffusion and  gradient echo T2 and sagittal T1-weighted images were obtained of the  entire head.     This is correlated to prior MRI of the brain from [facility] on 07/14/2020.     FINDINGS: There is some patchy nodular T2 high signal in periventricular  white matter extending into the subcortical white matter cerebral  hemispheres consistent with mild-to-moderate small vessel disease. There  is perhaps a tiny 3-4 mm old posterior superior right frontal  subcortical white matter infarctive unchanged. The remainder of the  brain parenchyma is normal in signal intensity. Specifically no  diffusion weighted abnormality is seen with no acute infarct identified.  On the gradient echo T2 weighted images no acute or old blood breakdown  products are seen intracranially. The  ventricles are normal in size. I  see no focal mass effect. There is no midline shift. No extra-axial  fluid collections are identified. There is minimal bilateral ethmoid  sinus and right maxillary sinus mucosal thickening. The remainder of the  paranasal sinuses and mastoid air cells and middle ear cavities are  clear. Good flow voids are demonstrated within the cerebral vessels and  in the dural venous sinuses. The calvarium and skull base demonstrate  normal marrow signal intensity. The orbits are unremarkable.     IMPRESSION:  No change when compared to prior [facility] the brain [facility]. There is mild-to-moderate small  vessel disease in the cerebral white matter and a small 4 mm old  posterior superior right frontal subcortical white matter infarct. The  remainder of MRI of the brain is within normal limits with no acute  infarct identified.    NONCONTRAST CRANIAL CT SCAN, CT ANGIOGRAM NECK, CT ANGIOGRAM HEAD-9/21/20   HISTORY: Visual changes     COMPARISON: 07/14/2020.     TECHNIQUE:  Radiation dose reduction techniques were utilized, including  automated exposure control and exposure modulation based on body size.   Initially, a routine noncontrast cranial CT performed from the skull  base through the vertex region. After review, thin-section contrast  enhanced CT angiogram images obtained from the aortic arch through the  calvarial vertex utilizing angiographic technique. Multi projection 3-D  MIP reformatted images were supplemented and reviewed.        FINDINGS CRANIAL CT: No acute hemorrhage or midline shift is  demonstrated..  There is diffuse atrophy. There is periventricular and  deep white matter microangiopathic change. There are basal ganglia  calcifications.Postcontrast imaging does not demonstrate any suspicious  enhancement of venous occlusion.  Bone window images demonstrate a  mucous retention cyst within the right ethmoid sinus..        CERVICAL CAROTID CT  ANGIOGRAM:     FINDINGS: There is normal arch anatomy. There is some calcification of  the aortic arch the right common carotid artery is widely patent. There  is some calcified plaque seen at the right carotid bifurcation, but does  not appear to result in hemodynamically significant stenosis. Distal  right internal carotid arteries widely patent. Left common carotid  arteries widely patent. There is some calcified plaque noted at the left  carotid bifurcation, without hemodynamically significant stenosis.  Distal left internal carotid artery is widely patent. No evidence of  stenosis or dissection involving the vertebral arteries is seen. The  left vertebral artery is dominant. There is some calcification noted at  the origin of the left vertebral artery, although I don't think it  results in any stenosis.     Images through the lung apices do not demonstrate any acute  abnormalities. Peripherally calcified nodule is seen within the left  lobe of the thyroid gland.  NASCET criteria utilized in stenosis measurements. Stenosis mild, 0-49%.        CRANIAL CTA ANGIOGRAM:     FINDINGS:  The intracranial internal carotid arteries widely patent,  although there is some mild calcification noted. This does not appear to  result in any hemodynamically significant stenosis. The middle cerebral  arteries are widely patent. There is a patent anterior communicating  artery. The anterior cerebral arteries are normal.  The intracranial  vertebral arteries are patent. There is some calcified plaque involving  the intracranial vertebral arteries bilaterally. The patient did have an  MRA the brain performed July 2020. There did not appear to show any  hemodynamically significant stenosis of the right vertebral artery.  Right vertebral artery does appear narrowed due to the calcification,  but given recent normal MRA, this may be artifactual. The posterior  cerebral arteries are widely patent.        IMPRESSION:  1. No acute  intracranial findings.  2. No hemodynamically significant stenosis of the cervical vasculature.  3. The patient is noted to have some calcification of the intracranial  right vertebral artery. This appears to result in moderate narrowing on  these images, but patient had a recent MRA in July 2020 which did not  show any evidence of stenosis involving the right vertebral artery.  Appearance on the current examination may be artifactual. No other  intracranial stenosis or occlusion seen.      Consults:   Consults     Date and Time Order Name Status Description    9/21/2020 2120 Inpatient Neurology Consult Stroke Completed     9/21/2020 2108 [facility] (on-call MD unless specified) Details Completed            Discharge Disposition:  Home or Self Care    Discharge Medications:     Discharge Medications      New Medications      Instructions Start Date   amitriptyline 25 MG tablet  Commonly known as: ELAVIL   25 mg, Oral, Nightly         Continue These Medications      Instructions Start Date   Accu-Chek Janae Plus w/Device kit   1 kit, Does not apply, 4 Times Daily      Accu-Chek FastClix Lancet kit   1 kit, Does not apply, 4 Times Daily      Accu-Chek FastClix Lancets misc   No dose, route, or frequency recorded.      Cartia  MG 24 hr capsule  Generic drug: dilTIAZem CD   TAKE 1 CAPSULE BY MOUTH DAILY      diazePAM 2 MG tablet  Commonly known as: Valium   Take one tab 60 minutes prior to test.  May take another dose after 30 minutes if needed.      Fluzone High-Dose Quadrivalent 0.7 ML suspension prefilled syringe  Generic drug: Influenza Vac High-Dose Quad   TO BE ADMINISTERED BY PHARMACIST FOR IMMUNIZATION      glucose blood test strip   Use as instructed      lisinopril 30 MG tablet  Commonly known as: PRINIVIL,ZESTRIL   30 mg, Oral, Daily      metFORMIN 500 MG tablet  Commonly known as: GLUCOPHAGE   500 mg, Oral, 2 Times Daily With Meals, TAKE 1 TABLET BY MOUTH EVERY 12 HOURS WITH FOOD      metoprolol tartrate 50  MG tablet  Commonly known as: LOPRESSOR   50 mg, Oral, Daily      Nitrostat 0.4 MG SL tablet  Generic drug: nitroglycerin   0.4 mg, Sublingual, Every 5 Minutes PRN      pantoprazole 40 MG EC tablet  Commonly known as: PROTONIX   40 mg, Oral, Daily      rosuvastatin 10 MG tablet  Commonly known as: CRESTOR   No dose, route, or frequency recorded.      tamsulosin 0.4 MG capsule 24 hr capsule  Commonly known as: FLOMAX   1 capsule, Oral, Daily      Xarelto 20 MG tablet  Generic drug: rivaroxaban   TAKE 1 TABLET BY MOUTH DAILY             Discharge Diet:   Diet Instructions     Diet: Consistent Carbohydrate, Cardiac; Thin      Discharge Diet:  Consistent Carbohydrate  Cardiac       Fluid Consistency: Thin          Activity at Discharge: As tolerated    Follow-up Appointments:  Future Appointments   Date Time Provider Department Center   12/11/2020 10:00 AM Leticia Aguirre MD [facility]     Additional Instructions for the Follow-ups that You Need to Schedule     Discharge Follow-up with PCP   As directed       Currently Documented PCP:    Shannan Aguirre APRN    PCP Phone Number:    [phone]     Follow Up Details: 1-2 weeks               Sagar Mares MD  09/22/20  16:33 EDT    Time Spent on Discharge Activities: Greater than 30 minutes.

## 2020-09-22 NOTE — CONSULTS
"  Patient Identification:  NAME:  Doc Coyne  Age:  80 y.o.   Sex:  male   :  1940   MRN:  [id number]       Chief complaint: My eyes, reason for consult abnormal episodes of vision abnormality    History of present illness: This patient is a 80-year-old right-handed white male with history of atrial fibrillation chest pain hypertension peripheral neuropathy and has had a stroke back in February which affected the left side of his body any pretty much got over that he had a fall at [employer] about 2 weeks ago in which he struck his forehead on a plate glass door he did not break the glass out but it really \"knocked him silly\".  Since then he has had 5 episodes of when he gets a spot in his vision or flashing electrical-like thing in his vision he believes it is both eyes in location duration more than a few seconds no associated definite headache at all modifying factors none it happened again last night and he came to the hospital to have it checked out CT and CTA of the head by my independent eyeball review showed chronic changes only but nothing acute  MRI by my independent eyeball review shows chronic changes the old right hemisphere stroke but no acute stroke      Past medical history:  Past Medical History:   Diagnosis Date   • Atherosclerotic heart disease of native coronary artery without angina pectoris    • Atrial fibrillation (CMS/HCC)    • Benign prostatic hypertrophy    • Chest pain    • COPD (chronic obstructive pulmonary disease) (CMS/HCC)    • Diabetes mellitus (CMS/HCC)    • Difficulty walking    • Hypertension    • Peripheral neuropathy    • Prostate cancer (CMS/HCC)        Past surgical history:  Past Surgical History:   Procedure Laterality Date   • COLONOSCOPY     • HEMORRHOIDECTOMY     • PROSTATE SURGERY         Allergies:  Isosorbide nitrate, Novocain  [procaine], Penicillins, Propoxyphene, Cephalexin, Doxycycline, and Sulfa antibiotics    Home medications:  Medications Prior to " Admission   Medication Sig Dispense Refill Last Dose   • CARTIA  MG 24 hr capsule TAKE 1 CAPSULE BY MOUTH DAILY 90 capsule 5    • diazePAM (VALIUM) 2 MG tablet Take one tab 60 minutes prior to test.  May take another dose after 30 minutes if needed. 2 tablet 0    • lisinopril (PRINIVIL,ZESTRIL) 30 MG tablet Take 30 mg by mouth Daily.  2    • metFORMIN (GLUCOPHAGE) 500 MG tablet Take 1 tablet by mouth 2 (Two) Times a Day With Meals. TAKE 1 TABLET BY MOUTH EVERY 12 HOURS WITH FOOD 180 tablet 1    • metoprolol tartrate (LOPRESSOR) 50 MG tablet Take 1 tablet by mouth Daily. 90 tablet 1    • NITROSTAT 0.4 MG SL tablet Place 0.4 mg under the tongue Every 5 (Five) Minutes As Needed.  1    • pantoprazole (PROTONIX) 40 MG EC tablet Take 1 tablet by mouth Daily. 30 tablet 0    • tamsulosin (FLOMAX) 0.4 MG capsule 24 hr capsule Take 1 capsule by mouth Daily.      • XARELTO 20 MG tablet TAKE 1 TABLET BY MOUTH DAILY 30 tablet 0    • Accu-Chek FastClix Lancets misc       • Blood Glucose Monitoring Suppl (ACCU-CHEK TOMAS PLUS) w/Device kit 1 kit 4 (Four) Times a Day. 1 kit 1    • Fluzone High-Dose Quadrivalent 0.7 ML suspension prefilled syringe TO BE ADMINISTERED BY PHARMACIST FOR IMMUNIZATION      • glucose blood test strip Use as instructed 100 each 12    • Lancets Misc. (ACCU-CHEK FASTCLIX LANCET) kit 1 kit 4 (Four) Times a Day. 1 kit 1    • rosuvastatin (CRESTOR) 10 MG tablet            Hospital medications:  aspirin, 325 mg, Oral, Daily    Or  aspirin, 300 mg, Rectal, Daily  atorvastatin, 80 mg, Oral, Nightly  cetirizine, 5 mg, Oral, Daily  dilTIAZem CD, 120 mg, Oral, Daily  metoprolol tartrate, 50 mg, Oral, Daily  pantoprazole, 40 mg, Oral, Daily  sodium chloride, 10 mL, Intravenous, Q12H  tamsulosin, 0.4 mg, Oral, Daily         •  acetaminophen  •  docusate sodium  •  ondansetron  •  [COMPLETED] Insert peripheral IV **AND** sodium chloride  •  sodium chloride    Family history:  Family History   Problem Relation Age  of Onset   • Hypertension Father    • Heart failure Father    • Hyperlipidemia Father    • Heart disease Father    • Heart attack Father    • Heart attack Sister    • Stroke Mother    • Heart disease Mother    • Diabetes Mother    • Kidney disease Maternal Grandmother        Social history:  Social History     Tobacco Use   • Smoking status: Former Smoker     Packs/day: 0.50     Types: Cigarettes     Quit date: 2020     Years since quittin.1   • Smokeless tobacco: Never Used   • Tobacco comment: QUIT 2 MONTHS AGO   Substance Use Topics   • Alcohol use: Yes     Frequency: Never     Comment: OCCASIONALLY/ wine   • Drug use: No       Review of systems:    No hair eyes ears nose throat skin bone joint  lymphatic hematologic oncologic complaints no neck pain chest pain abdominal pain bowel bladder incontinence fever chills or rash he denies headache or diplopia    Objective:  Vitals Ranges:   Temp:  [97 °F (36.1 °C)-98 °F (36.7 °C)] 98 °F (36.7 °C)  Heart Rate:  [51-83] 56  Resp:  [16] 16  BP: (112-153)/() 112/59      Physical Exam:  He is awake alert oriented x3 in no distress fund of knowledge good attention span concentration good recent remote memory fair language function normal well-developed well-nourished no distress pupils 2 constricting to 1-1/2 unable to visualize disc retinas extraocular movements are full without nystagmus nasolabial folds palate tongue symmetrical decreased hearing normal facial sensation head turning shoulder shrug visual fields are full motor 5 out of 5 x 4 extremities no pronator drift atrophy fasciculations resting tremor reflexes trace throughout symmetrical toes downgoing bilaterally sensation normal light touch face both arms and both legs coordination normal in the upper extremities he is able to ambulate without ataxia heart is regular without murmur neck supple without bruits extremities no clubbing cyanosis or edema    Results review:   I reviewed the patient's  new clinical results.    Data review:  Lab Results (last 24 hours)     Procedure Component Value Units Date/Time    POC Glucose Once [[id number]]  (Abnormal) Collected: 09/22/20 1114    Specimen: Blood Updated: 09/22/20 1123     Glucose 154 mg/dL     Lipid Panel [[id number]] Collected: 09/22/20 0438    Specimen: Blood Updated: 09/22/20 0628     Total Cholesterol 106 mg/dL      Triglycerides 91 mg/dL      HDL Cholesterol 42 mg/dL      LDL Cholesterol  46 mg/dL      VLDL Cholesterol 18.2 mg/dL      LDL/HDL Ratio 1.09    Narrative:      Cholesterol Reference Ranges  (U.S. Department of Health and Human Services ATP III Classifications)    Desirable          <200 mg/dL  Borderline High    200-239 mg/dL  High Risk          >240 mg/dL      Triglyceride Reference Ranges  (U.S. Department of Health and Human Services ATP III Classifications)    Normal           <150 mg/dL  Borderline High  150-199 mg/dL  High             200-499 mg/dL  Very High        >500 mg/dL    HDL Reference Ranges  (U.S. Department of Health and Human Services ATP III Classifcations)    Low     <40 mg/dl (major risk factor for CHD)  High    >60 mg/dl ('negative' risk factor for CHD)        LDL Reference Ranges  (U.S. Department of Health and Human Services ATP III Classifcations)    Optimal          <100 mg/dL  Near Optimal     100-129 mg/dL  Borderline High  130-159 mg/dL  High             160-189 mg/dL  Very High        >189 mg/dL    Comprehensive Metabolic Panel [[id number]]  (Abnormal) Collected: 09/22/20 0438    Specimen: Blood Updated: 09/22/20 0628     Glucose 157 mg/dL      BUN 21 mg/dL      Creatinine 0.86 mg/dL      Sodium 142 mmol/L      Potassium 4.8 mmol/L      Chloride 102 mmol/L      CO2 31.0 mmol/L      Calcium 9.3 mg/dL      Total Protein 7.1 g/dL      Albumin 4.80 g/dL      ALT (SGPT) 28 U/L      AST (SGOT) 11 U/L      Alkaline Phosphatase 56 U/L      Total Bilirubin 1.0 mg/dL      eGFR Non African Amer 86 mL/min/1.73      Globulin 2.3  gm/dL      A/G Ratio 2.1 g/dL      BUN/Creatinine Ratio 24.4     Anion Gap 9.0 mmol/L     Narrative:      GFR Normal >60  Chronic Kidney Disease <60  Kidney Failure <15      POC Glucose Once [[id number]]  (Normal) Collected: 09/22/20 0624    Specimen: Blood Updated: 09/22/20 0626     Glucose 130 mg/dL     Hemoglobin A1c [[id number]]  (Abnormal) Collected: 09/22/20 0438    Specimen: Blood Updated: 09/22/20 0615     Hemoglobin A1C 6.10 %     Narrative:      Hemoglobin A1C Ranges:    Increased Risk for Diabetes  5.7% to 6.4%  Diabetes                     >= 6.5%  Diabetic Goal                < 7.0%    CBC (No Diff) [[id number]]  (Normal) Collected: 09/22/20 0438    Specimen: Blood Updated: 09/22/20 0558     WBC 10.33 10*3/mm3      RBC 5.16 10*6/mm3      Hemoglobin 15.8 g/dL      Hematocrit 48.2 %      MCV 93.4 fL      MCH 30.6 pg      MCHC 32.8 g/dL      RDW 13.0 %      RDW-SD 44.3 fl      MPV 10.4 fL      Platelets 156 10*3/mm3     POC Glucose Once [[id number]]  (Abnormal) Collected: 09/22/20 0023    Specimen: Blood Updated: 09/22/20 0025     Glucose 176 mg/dL     COVID PRE-OP / PRE-PROCEDURE SCREENING ORDER (NO ISOLATION) - Swab, Nasopharynx [[id number]]  (Normal) Collected: 09/21/20 2138    Specimen: Swab from Nasopharynx Updated: 09/21/20 2241    Narrative:      The following orders were created for panel order COVID PRE-OP / PRE-PROCEDURE SCREENING ORDER (NO ISOLATION) - Swab, Nasopharynx.  Procedure                               Abnormality         Status                     ---------                               -----------         ------                     Respiratory Panel PCR w/...[[id number]]  Normal              Final result                 Please view results for these tests on the individual orders.    Respiratory Panel PCR w/COVID-19(SARS-CoV-2) ECHO/JODI/JENNIFER/PAD/COR/MAD In-House, NP Swab in UTM/VTM, 3-4 HR TAT - Swab, Nasopharynx [[id number]]  (Normal) Collected: 09/21/20 6094    Specimen: Swab from  Nasopharynx Updated: 09/21/20 2241     ADENOVIRUS, PCR Not Detected     Coronavirus 229E Not Detected     Coronavirus HKU1 Not Detected     Coronavirus NL63 Not Detected     Coronavirus OC43 Not Detected     COVID19 Not Detected     Human Metapneumovirus Not Detected     Human Rhinovirus/Enterovirus Not Detected     Influenza A PCR Not Detected     Influenza A H1 Not Detected     Influenza A H1 2009 PCR Not Detected     Influenza A H3 Not Detected     Influenza B PCR Not Detected     Parainfluenza Virus 1 Not Detected     Parainfluenza Virus 2 Not Detected     Parainfluenza Virus 3 Not Detected     Parainfluenza Virus 4 Not Detected     RSV, PCR Not Detected     Bordetella pertussis pcr Not Detected     Bordetella parapertussis PCR Not Detected     Chlamydophila pneumoniae PCR Not Detected     Mycoplasma pneumo by PCR Not Detected    Narrative:      Fact sheet for providers: https://docs.[employer]look[employer]/wp-content/uploads/CED1267-1303-IM5.1-EUA-Provider-Fact-Sheet-3.pdf    Fact sheet for patients: https://docs.[employer]look[employer]/wp-content/uploads/AXS3639-1155-NZ5.1-EUA-Patient-Fact-Sheet-1.pdf    Comprehensive Metabolic Panel [[id number]]  (Abnormal) Collected: 09/21/20 2120    Specimen: Blood Updated: 09/21/20 2144     Glucose 132 mg/dL      BUN 24 mg/dL      Creatinine 0.90 mg/dL      Sodium 142 mmol/L      Potassium 4.5 mmol/L      Chloride 105 mmol/L      CO2 31.1 mmol/L      Calcium 9.4 mg/dL      Total Protein 7.2 g/dL      Albumin 4.60 g/dL      ALT (SGPT) 30 U/L      AST (SGOT) 12 U/L      Alkaline Phosphatase 55 U/L      Total Bilirubin 0.8 mg/dL      eGFR Non African Amer 81 mL/min/1.73      Globulin 2.6 gm/dL      A/G Ratio 1.8 g/dL      BUN/Creatinine Ratio 26.7     Anion Gap 5.9 mmol/L     Narrative:      GFR Normal >60  Chronic Kidney Disease <60  Kidney Failure <15      Protime-INR [[id number]]  (Abnormal) Collected: 09/21/20 2120    Specimen: Blood Updated: 09/21/20 2144     Protime 24.1 Seconds      INR  2.23    CBC & Differential [[id number]]  (Abnormal) Collected: 09/21/20 2120    Specimen: Blood Updated: 09/21/20 2128    Narrative:      The following orders were created for panel order CBC & Differential.  Procedure                               Abnormality         Status                     ---------                               -----------         ------                     CBC Auto Differential[[id number]]        Abnormal            Final result                 Please view results for these tests on the individual orders.    CBC Auto Differential [[id number]]  (Abnormal) Collected: 09/21/20 2120    Specimen: Blood Updated: 09/21/20 2128     WBC 10.31 10*3/mm3      RBC 5.12 10*6/mm3      Hemoglobin 15.5 g/dL      Hematocrit 47.0 %      MCV 91.8 fL      MCH 30.3 pg      MCHC 33.0 g/dL      RDW 13.0 %      RDW-SD 43.9 fl      MPV 10.2 fL      Platelets 157 10*3/mm3      Neutrophil % 56.2 %      Lymphocyte % 33.5 %      Monocyte % 7.1 %      Eosinophil % 0.9 %      Basophil % 0.6 %      Immature Grans % 1.7 %      Neutrophils, Absolute 5.80 10*3/mm3      Lymphocytes, Absolute 3.45 10*3/mm3      Monocytes, Absolute 0.73 10*3/mm3      Eosinophils, Absolute 0.09 10*3/mm3      Basophils, Absolute 0.06 10*3/mm3      Immature Grans, Absolute 0.18 10*3/mm3      nRBC 0.0 /100 WBC            Imaging:  Imaging Results (Last 24 Hours)     Procedure Component Value Units Date/Time    MRI Brain Without Contrast [[id number]] Collected: 09/22/20 1454     Updated: 09/22/20 1454    Narrative:      STAT MRI OF THE BRAIN WITHOUT CONTRAST 09/22/2020     CLINICAL HISTORY: Patient hit head 2 months ago on door and fell  complains of blurry vision flashing lights behind both eyes prior  history of stroke in 2019.     TECHNIQUE: Axial T1, FLAIR, fat-suppressed T2, axial diffusion and  gradient echo T2 and sagittal T1-weighted images were obtained of the  entire head.     This is correlated to prior MRI of the brain from [facility]  [facility] on 07/14/2020.     FINDINGS: There is some patchy nodular T2 high signal in periventricular  white matter extending into the subcortical white matter cerebral  hemispheres consistent with mild-to-moderate small vessel disease. There  is perhaps a tiny 3-4 mm old posterior superior right frontal  subcortical white matter infarctive unchanged. The remainder of the  brain parenchyma is normal in signal intensity. Specifically no  diffusion weighted abnormality is seen with no acute infarct identified.  On the gradient echo T2 weighted images no acute or old blood breakdown  products are seen intracranially. The ventricles are normal in size. I  see no focal mass effect. There is no midline shift. No extra-axial  fluid collections are identified. There is minimal bilateral ethmoid  sinus and right maxillary sinus mucosal thickening. The remainder of the  paranasal sinuses and mastoid air cells and middle ear cavities are  clear. Good flow voids are demonstrated within the cerebral vessels and  in the dural venous sinuses. The calvarium and skull base demonstrate  normal marrow signal intensity. The orbits are unremarkable.       Impression:      No change when compared to prior MRI of the brain from  [facility] on 07/14/2020. There is mild-to-moderate small  vessel disease in the cerebral white matter and a small 4 mm old  posterior superior right frontal subcortical white matter infarct. The  remainder of MRI of the brain is within normal limits with no acute  infarct identified. The results were communicated to Dr. Sagar Lan  by telephone 09/22/2020 at 2:30 PM.       CT Head Without Contrast [[id number]] Collected: 09/21/20 2015     Updated: 09/22/20 0746    Narrative:      CT HEAD WITHOUT CONTRAST     HISTORY: Blurry vision.     COMPARISON: MRI brain 07/14/2020.     FINDINGS: The brain ventricles are symmetrical. There is age-appropriate  atrophy. There is no evidence of  hemorrhage, hydrocephalus or of  abnormal extra-axial fluid. No focal area of decreased attenuation to  suggest acute infarction is identified. Mild to moderate vascular  calcification is noted.       Impression:      No evidence of acute infarction or hemorrhage. There is  age-appropriate atrophy, mild small vessel ischemic disease and mild to  moderate vascular calcification appreciated. Further evaluation could be  performed with a MRI examination of the brain as indicated.           Radiation dose reduction techniques were utilized, including automated  exposure control and exposure modulation based on body size.     This report was finalized on 9/22/2020 7:43 AM by Dr. Doc Molina M.D.       CT Angiogram Head [[id number]] Collected: 09/22/20 0110     Updated: 09/22/20 0117    Narrative:      NONCONTRAST CRANIAL CT SCAN, CT ANGIOGRAM NECK, CT ANGIOGRAM HEAD        HISTORY: Visual changes     COMPARISON: 07/14/2020.     TECHNIQUE:  Radiation dose reduction techniques were utilized, including  automated exposure control and exposure modulation based on body size.   Initially, a routine noncontrast cranial CT performed from the skull  base through the vertex region. After review, thin-section contrast  enhanced CT angiogram images obtained from the aortic arch through the  calvarial vertex utilizing angiographic technique. Multi projection 3-D  MIP reformatted images were supplemented and reviewed.        FINDINGS CRANIAL CT: No acute hemorrhage or midline shift is  demonstrated..  There is diffuse atrophy. There is periventricular and  deep white matter microangiopathic change. There are basal ganglia  calcifications.Postcontrast imaging does not demonstrate any suspicious  enhancement of venous occlusion.  Bone window images demonstrate a  mucous retention cyst within the right ethmoid sinus..        CERVICAL CAROTID CT ANGIOGRAM:     FINDINGS: There is normal arch anatomy. There is some calcification of  the  aortic arch the right common carotid artery is widely patent. There  is some calcified plaque seen at the right carotid bifurcation, but does  not appear to result in hemodynamically significant stenosis. Distal  right internal carotid arteries widely patent. Left common carotid  arteries widely patent. There is some calcified plaque noted at the left  carotid bifurcation, without hemodynamically significant stenosis.  Distal left internal carotid artery is widely patent. No evidence of  stenosis or dissection involving the vertebral arteries is seen. The  left vertebral artery is dominant. There is some calcification noted at  the origin of the left vertebral artery, although I don't think it  results in any stenosis.     Images through the lung apices do not demonstrate any acute  abnormalities. Peripherally calcified nodule is seen within the left  lobe of the thyroid gland.  NASCET criteria utilized in stenosis measurements. Stenosis mild, 0-49%.        CRANIAL CTA ANGIOGRAM:     FINDINGS:  The intracranial internal carotid arteries widely patent,  although there is some mild calcification noted. This does not appear to  result in any hemodynamically significant stenosis. The middle cerebral  arteries are widely patent. There is a patent anterior communicating  artery. The anterior cerebral arteries are normal.  The intracranial  vertebral arteries are patent. There is some calcified plaque involving  the intracranial vertebral arteries bilaterally. The patient did have an  MRA the brain performed July 2020. There did not appear to show any  hemodynamically significant stenosis of the right vertebral artery.  Right vertebral artery does appear narrowed due to the calcification,  but given recent normal MRA, this may be artifactual. The posterior  cerebral arteries are widely patent.          Impression:      1. No acute intracranial findings.  2. No hemodynamically significant stenosis of the cervical  vasculature.  3. The patient is noted to have some calcification of the intracranial  right vertebral artery. This appears to result in moderate narrowing on  these images, but patient had a recent MRA in July 2020 which did not  show any evidence of stenosis involving the right vertebral artery.  Appearance on the current examination may be artifactual. No other  intracranial stenosis or occlusion seen.        Radiation dose reduction techniques were utilized, including automated  exposure control and exposure modulation based on body size.     This report was finalized on 9/22/2020 1:14 AM by Dr. Kandi Drew M.D.       CT Angiogram Neck [[id number]] Collected: 09/22/20 0110     Updated: 09/22/20 0117    Narrative:      NONCONTRAST CRANIAL CT SCAN, CT ANGIOGRAM NECK, CT ANGIOGRAM HEAD        HISTORY: Visual changes     COMPARISON: 07/14/2020.     TECHNIQUE:  Radiation dose reduction techniques were utilized, including  automated exposure control and exposure modulation based on body size.   Initially, a routine noncontrast cranial CT performed from the skull  base through the vertex region. After review, thin-section contrast  enhanced CT angiogram images obtained from the aortic arch through the  calvarial vertex utilizing angiographic technique. Multi projection 3-D  MIP reformatted images were supplemented and reviewed.        FINDINGS CRANIAL CT: No acute hemorrhage or midline shift is  demonstrated..  There is diffuse atrophy. There is periventricular and  deep white matter microangiopathic change. There are basal ganglia  calcifications.Postcontrast imaging does not demonstrate any suspicious  enhancement of venous occlusion.  Bone window images demonstrate a  mucous retention cyst within the right ethmoid sinus..        CERVICAL CAROTID CT ANGIOGRAM:     FINDINGS: There is normal arch anatomy. There is some calcification of  the aortic arch the right common carotid artery is widely patent. There  is  some calcified plaque seen at the right carotid bifurcation, but does  not appear to result in hemodynamically significant stenosis. Distal  right internal carotid arteries widely patent. Left common carotid  arteries widely patent. There is some calcified plaque noted at the left  carotid bifurcation, without hemodynamically significant stenosis.  Distal left internal carotid artery is widely patent. No evidence of  stenosis or dissection involving the vertebral arteries is seen. The  left vertebral artery is dominant. There is some calcification noted at  the origin of the left vertebral artery, although I don't think it  results in any stenosis.     Images through the lung apices do not demonstrate any acute  abnormalities. Peripherally calcified nodule is seen within the left  lobe of the thyroid gland.  NASCET criteria utilized in stenosis measurements. Stenosis mild, 0-49%.        CRANIAL CTA ANGIOGRAM:     FINDINGS:  The intracranial internal carotid arteries widely patent,  although there is some mild calcification noted. This does not appear to  result in any hemodynamically significant stenosis. The middle cerebral  arteries are widely patent. There is a patent anterior communicating  artery. The anterior cerebral arteries are normal.  The intracranial  vertebral arteries are patent. There is some calcified plaque involving  the intracranial vertebral arteries bilaterally. The patient did have an  MRA the brain performed July 2020. There did not appear to show any  hemodynamically significant stenosis of the right vertebral artery.  Right vertebral artery does appear narrowed due to the calcification,  but given recent normal MRA, this may be artifactual. The posterior  cerebral arteries are widely patent.          Impression:      1. No acute intracranial findings.  2. No hemodynamically significant stenosis of the cervical vasculature.  3. The patient is noted to have some calcification of the  intracranial  right vertebral artery. This appears to result in moderate narrowing on  these images, but patient had a recent MRA in July 2020 which did not  show any evidence of stenosis involving the right vertebral artery.  Appearance on the current examination may be artifactual. No other  intracranial stenosis or occlusion seen.        Radiation dose reduction techniques were utilized, including automated  exposure control and exposure modulation based on body size.     This report was finalized on 9/22/2020 1:14 AM by Dr. Kandi Drew M.D.            PPE worn by me at all times washed before washed up afterwards disposed of everything properly was not within 6 feet of him for more than a few minutes during my exam no aerosols were used    Assessment and Plan:     Variant migraine/acephalgic migraine following concussion a couple of weeks ago.  His MRI by my independent eyeball review does show evidence of previous stroke but he does not look or sound like any new stroke or TIA syndrome tonight he will get Elavil 25 mg p.o. nightly and see how he does overnight he has complained of decreased sleep and that should also help that problem too.    Cali Joshi MD  09/22/20  15:57 EDT

## 2020-09-22 NOTE — PLAN OF CARE
Problem: Adult Inpatient Plan of Care  Goal: Plan of Care Review  Outcome: Met  Flowsheets (Taken 9/22/2020 1650)  Progress: improving  Plan of Care Reviewed With:   patient   significant other  Goal: Patient-Specific Goal (Individualized)  Outcome: Met  Goal: Absence of Hospital-Acquired Illness or Injury  Outcome: Met  Intervention: Identify and Manage Fall Risk  Recent Flowsheet Documentation  Taken 9/22/2020 1633 by Mariela Mills RN  Safety Promotion/Fall Prevention:   activity supervised   assistive device/personal items within reach   clutter free environment maintained   safety round/check completed   nonskid shoes/slippers when out of bed  Taken 9/22/2020 1510 by Mariela Mills RN  Safety Promotion/Fall Prevention:   activity supervised   assistive device/personal items within reach   clutter free environment maintained   safety round/check completed   room organization consistent  Taken 9/22/2020 1200 by Mariela Mills RN  Safety Promotion/Fall Prevention: patient off unit  Taken 9/22/2020 1000 by Mariela Mills RN  Safety Promotion/Fall Prevention:   activity supervised   assistive device/personal items within reach   clutter free environment maintained   nonskid shoes/slippers when out of bed   safety round/check completed  Taken 9/22/2020 0936 by Mariela Mills RN  Safety Promotion/Fall Prevention:   activity supervised   assistive device/personal items within reach   clutter free environment maintained   safety round/check completed   room organization consistent  Taken 9/22/2020 0800 by Mariela Mills RN  Safety Promotion/Fall Prevention:   safety round/check completed   clutter free environment maintained   assistive device/personal items within reach  Intervention: Prevent Skin Injury  Recent Flowsheet Documentation  Taken 9/22/2020 1633 by Mariela Mills RN  Body Position: position changed independently  Taken 9/22/2020 1510 by Mariela Mills RN  Body Position: position changed independently  Taken  9/22/2020 1000 by Mariela Mills RN  Body Position: position changed independently  Taken 9/22/2020 0936 by Mariela Mills RN  Body Position: position changed independently  Taken 9/22/2020 0800 by Mariela Mills RN  Body Position: position changed independently  Intervention: Prevent and Manage VTE (venous thromboembolism) Risk  Recent Flowsheet Documentation  Taken 9/22/2020 1510 by Mariela Mills RN  VTE Prevention/Management:   bilateral   sequential compression devices off  Taken 9/22/2020 0936 by Mariela Mills RN  VTE Prevention/Management:   bilateral   dorsiflexion/plantar flexion performed  Goal: Optimal Comfort and Wellbeing  Outcome: Met  Intervention: Provide Person-Centered Care  Recent Flowsheet Documentation  Taken 9/22/2020 1510 by Mariela Mills RN  Trust Relationship/Rapport:   care explained   questions answered   thoughts/feelings acknowledged  Taken 9/22/2020 0936 by Mariela Mills RN  Trust Relationship/Rapport:   care explained   thoughts/feelings acknowledged   questions encouraged  Goal: Readiness for Transition of Care  Outcome: Met     Problem: Adjustment to Illness (Stroke, Ischemic/Transient Ischemic Attack)  Goal: Optimal Coping  Outcome: Met  Intervention: Support Patient/Family Psychosocial Response to Stroke  Recent Flowsheet Documentation  Taken 9/22/2020 1510 by Mariela Mills RN  Family/Support System Care:   caregiver stress acknowledged   self-care encouraged   support provided  Taken 9/22/2020 0936 by Mariela Mills RN  Family/Support System Care:   self-care encouraged   support provided     Problem: Bowel Elimination Impaired (Stroke, Ischemic/Transient Ischemic Attack)  Goal: Effective Bowel Elimination  Outcome: Met  Intervention: Promote Effective Bowel Elimination  Recent Flowsheet Documentation  Taken 9/22/2020 1510 by Mariela Mills RN  Bowel Elimination Management: toileting offered     Problem: Cerebral Tissue Perfusion Risk (Stroke, Ischemic/Transient Ischemic Attack)  Goal:  Optimal Cerebral Tissue Perfusion  Outcome: Met     Problem: Communication Impairment (Stroke, Ischemic/Transient Ischemic Attack)  Goal: Improved Communication Skills  Outcome: Met     Problem: Eating/Swallowing Impairment (Stroke, Ischemic/Transient Ischemic Attack)  Goal: Oral Intake without Aspiration  Outcome: Met     Problem: Functional Ability Impaired (Stroke, Ischemic/Transient Ischemic Attack)  Goal: Optimal Functional Ability  Outcome: Met  Intervention: Optimize Functional Ability  Recent Flowsheet Documentation  Taken 9/22/2020 1633 by Mariela Mills RN  Activity Management: up in chair  Taken 9/22/2020 1510 by Mariela Mills RN  Activity Management: activity adjusted per tolerance  Taken 9/22/2020 1000 by Mariela Mills RN  Activity Management: activity adjusted per tolerance  Taken 9/22/2020 0936 by Mariela Mills RN  Activity Management:   activity adjusted per tolerance   up in chair  Taken 9/22/2020 0800 by Mariela Mills RN  Activity Management:   activity adjusted per tolerance   up in chair     Problem: Hemodynamic Instability (Stroke, Ischemic/Transient Ischemic Attack)  Goal: Vital Signs Remain in Desired Range  Outcome: Met     Problem: Pain (Stroke, Ischemic/Transient Ischemic Attack)  Goal: Acceptable Pain Control  Outcome: Met     Problem: Sensorimotor Impairment (Stroke, Ischemic/Transient Ischemic Attack)  Goal: Improved Sensorimotor Function  Outcome: Met     Problem: Urinary Elimination Impaired (Stroke, Ischemic/Transient Ischemic Attack)  Goal: Effective Urinary Elimination  Outcome: Met   Goal Outcome Evaluation:  Plan of Care Reviewed With: patient, significant other  Progress: improving    Vitals signs stable, patient in A-fib, RA. Up adlib, alert and orientated x4. NIH: 0  No complaints of vision changes this shift. MRI and echo today. D/C home with significant other. Will continue to monitor.

## 2020-09-22 NOTE — CONSULTS
Initial hospitality visit. Pt and spouse had just returned from tests and were sitting down to lunch. They requested prayer, and I left them to their meal.

## 2020-09-22 NOTE — SIGNIFICANT NOTE
09/22/20 0909   OTHER   Discipline occupational therapist   Rehab Time/Intention   Session Not Performed other (see comments)  (Screened pt for OT needs; pt reports no unilateral weakness and denies need for skilled OT services. Reports he ambulated fine w/ PT just prior to OT arrival. Formal OT eval not warranted. OT to sign off.)

## 2020-09-22 NOTE — NURSING NOTE
[facility] Acute [facility] Rehab Note     Received referral via stroke order set.  Imaging negative for stroke and therapies have signed off.  We will sign off at this time.    Thank you,  Bianka Fernandez RN  Rehab Admission Office  [phone]

## 2020-09-22 NOTE — PLAN OF CARE
Patient is a 80 y.o. male admitted to [facility] for blurry vision on 9/21/2020- patient reports this has not resolve but did initial mobility PT as patient reported some generalized weakness complaints. Patient is currently functioning at or near their baseline, SV for STS transfers, and SV for ambulation with no AD for 300 ft. No safety concerns demo'd. Patient denies any questions or concerns for PT at this time. Patient has no further acute skilled PT needs at this time and is appropriate for DC home with assist once medically appropriate. Encouraged patient to continue to ambulate during the remainder of his hospital stay.    Patient was wearing a face mask during this therapy encounter. Therapist used appropriate personal protective equipment including eye protection, mask, and gloves.  Mask used was standard procedure mask. Appropriate PPE was worn during the entire therapy session. Hand hygiene was completed before and after therapy session. Patient is not in enhanced droplet precautions.

## 2020-09-22 NOTE — ED PROVIDER NOTES
MD ATTESTATION NOTE    The GISELLA and I have discussed this patient's history, physical exam, and treatment plan.  I have reviewed the documentation and personally had a face to face interaction with the patient. I affirm the documentation and agree with the treatment and plan.  The attached note describes my personal findings.    80-year-old gentleman presents with a pretty poorly described, episodic visual disturbance is been going on for about 2 months.  He has had a stroke in the past, and he does have some moderate expressive deficits from that.  He has no new focal neurologic deficits, and this all started after he fell about 2 months ago.    His head CT shows nothing acute, TPA is not indicated because his symptoms have been off and on for 2 months and his NIH is 0.  He is been to be admitted for further evaluation by neurology.     Frank Gonzalez MD  09/21/20 9262

## 2020-09-22 NOTE — OUTREACH NOTE
Prep Survey      Responses   [facility] facility patient discharged from?  [place]   Is LACE score < 7 ?  No   Eligibility  [facility]   Date of Admission  09/21/20   Date of Discharge  09/22/20   Discharge Disposition  Home or Self Care   Discharge diagnosis  Visual changes variant migraine following the concussion.    COVID-19 Test Status  Negative   Does the patient have one of the following disease processes/diagnoses(primary or secondary)?  Other   Does the patient have Home health ordered?  No   Is there a DME ordered?  No   Prep survey completed?  Yes          Ira Varela RN

## 2020-09-22 NOTE — PROGRESS NOTES
Discharge Planning Assessment  [facility]     Patient Name: Doc Coyne  MRN: [id number]  Today's Date: 9/22/2020    Admit Date: 9/21/2020    Discharge Needs Assessment     Row Name 09/22/20 0924       Living Environment    Lives With  significant other    Current Living Arrangements  home/apartment/condo    Primary Care Provided by  self    Provides Primary Care For  no one    Family Caregiver if Needed  significant other    Quality of Family Relationships  helpful;supportive       Resource/Environmental Concerns    Resource/Environmental Concerns  none       Transition Planning    Patient/Family Anticipates Transition to  home    Patient/Family Anticipated Services at Transition  none       Discharge Needs Assessment    Readmission Within the Last 30 Days  no previous admission in last 30 days    Equipment Currently Used at Home  none    Provided Post Acute Provider List?  N/A    Provided Post Acute Provider Quality & Resource List?  N/A        Discharge Plan     Row Name 09/22/20 0925       Plan    Plan  Home with significant other    Plan Comments  S/W pt at chairside.  Facehseet and pharmacy info confirmed.  Pt lives with his significant other Susannah in a patio home, is IADLs and can drive.  No hx of DME, HH or SNF.  He states Susannah assists him with his meds at times, and they assist each other with household chores. Pt states he has 4 children who live locally, but he does not communicate with them. He confirms Susannah should be his only emergency contact.  Pt denies DC needs at this time.  CCP will continue to follow.        Continued Care and Services - Admitted Since 9/21/2020    Coordination has not been started for this encounter.         Demographic Summary     Row Name 09/22/20 0954       General Information    Admission Type  observation    Arrived From  home    Referral Source  admission list    Reason for Consult  discharge planning    Preferred Language  English        Functional Status      Row Name 09/22/20 0924       Functional Status    Usual Activity Tolerance  good    Current Activity Tolerance  good       Functional Status, IADL    Medications  independent;assistive person    Meal Preparation  independent;assistive person    Housekeeping  independent;assistive person    Laundry  independent;assistive person    Shopping  independent;assistive person       Mental Status    General Appearance WDL  WDL       Employment/    Employment Status  retired                Myla Johnson RN

## 2020-09-22 NOTE — ED PROVIDER NOTES
EMERGENCY DEPARTMENT ENCOUNTER    Room Number:  01/01  Date seen:  9/21/2020  Time seen: 20:14 EDT  PCP: Shannan Aguirre APRN  Historian: patient, family member, PCP office visit notes    HPI:  Chief complaint:visual problem, headache  A complete HPI/ROS/PMH/PSH/SH/FH are unobtainable due to: n/a  Context:Doc Coyne is a 80 y.o. male who presents to the ED with c/o visual problems described as abnormal sensation behind his eye and today he had sensation of lights flashing in his eyes while driving.  It is not made worse by anything and better with time.  States he has had this problem on and off after an episode of head trauma 2 months ago when he hit his head on glass door at [employer].  He did not receive care at that time. He denies headache, worse than normal slurred speech (he has h/o CVA 2/2019) or unilateral deficits/weaknesses. He went to his PCP about this today and was immediately referred here for further evaluation.  He is on Xarelto for h/o atrial fib.     Patient was placed in face mask in first look. Patient was wearing facemask when I entered the room and throughout our encounter. I wore full protective equipment throughout this patient encounter including a face mask, eye shield and gloves. Hand hygiene/washing of hands was performed before donning protective equipment and after removal when leaving the room.    MEDICAL RECORD REVIEW  Patient was placed in face mask in first look. Patient was wearing facemask when I entered the room and throughout our encounter. I wore full protective equipment throughout this patient encounter including a face mask, eye shield and gloves. Hand hygiene/washing of hands was performed before donning protective equipment and after removal when leaving the room.      ALLERGIES  Isosorbide nitrate, Novocain  [procaine], Penicillins, Propoxyphene, Cephalexin, Doxycycline, and Sulfa antibiotics    PAST MEDICAL HISTORY  Active Ambulatory Problems     Diagnosis Date  Noted   • Atrial fibrillation (CMS/HCC) 2016   • Chest pain, atypical 2016   • Shortness of breath 2016   • Preop cardiovascular exam 2016   • Anticoagulated 2018   • Atherosclerotic heart disease of native coronary artery without angina pectoris 2019   • Hypertension 2019   • Benign prostatic hypertrophy 2019   • COPD (chronic obstructive pulmonary disease) (CMS/HCC) 2019   • Type 2 diabetes mellitus, without long-term current use of insulin (CMS/HCC) 2019   • Bradycardia 2019   • Chest pain 2019   • TIA (transient ischemic attack) 2020   • Prostate CA (CMS/HCC) 2020     Resolved Ambulatory Problems     Diagnosis Date Noted   • No Resolved Ambulatory Problems     Past Medical History:   Diagnosis Date   • Diabetes mellitus (CMS/HCC)    • Difficulty walking    • Peripheral neuropathy    • Prostate cancer (CMS/HCC)        PAST SURGICAL HISTORY  Past Surgical History:   Procedure Laterality Date   • COLONOSCOPY     • HEMORRHOIDECTOMY     • PROSTATE SURGERY         FAMILY HISTORY  Family History   Problem Relation Age of Onset   • Hypertension Father    • Heart failure Father    • Hyperlipidemia Father    • Heart disease Father    • Heart attack Father    • Heart attack Sister    • Stroke Mother    • Heart disease Mother    • Diabetes Mother    • Kidney disease Maternal Grandmother        SOCIAL HISTORY  Social History     Socioeconomic History   • Marital status:      Spouse name: Not on file   • Number of children: Not on file   • Years of education: Not on file   • Highest education level: Not on file   Tobacco Use   • Smoking status: Former Smoker     Packs/day: 0.50     Types: Cigarettes     Quit date: 2020     Years since quittin.1   • Smokeless tobacco: Never Used   • Tobacco comment: QUIT 2 MONTHS AGO   Substance and Sexual Activity   • Alcohol use: Yes     Frequency: Never     Comment: OCCASIONALLY/ wine   •  Drug use: No   • Sexual activity: Defer       REVIEW OF SYSTEMS  Review of Systems    All systems reviewed and negative except for those discussed in HPI.     PHYSICAL EXAM    ED Triage Vitals   Temp Heart Rate Resp BP SpO2   09/21/20 1732 09/21/20 1732 09/21/20 1732 09/21/20 1940 09/21/20 1732   97 °F (36.1 °C) 64 16 120/67 95 %      Temp src Heart Rate Source Patient Position BP Location FiO2 (%)   09/21/20 1732 -- -- -- --   Tympanic         Physical Exam    I have reviewed the triage vital signs and nursing notes.      GENERAL: not distressed  HENT: nares patent, mm moist, no oropharyngeal erythema or edema  EYES: no scleral icterus, PERRL, EOMI  NECK: no ROM limitations, no cervical spine tenderness  CV: regular rhythm, regular rate, no murmur, no rubs, no gallups  RESPIRATORY: normal effort, CTAB  ABDOMEN: soft  : deferred  MUSCULOSKELETAL: no deformity, EASON=, no drift  NEURO: alert, moves all extremities, follows commands, Cranial nerves 2-12 intact as tested.  Sensation intact.  5/5 strength in all extremities.  Normal cerebellar testing.  No drift in any extremity.  No dysarthria.  No aphasia.  No neglect/extinction.     SKIN: warm, dry    LAB RESULTS  Recent Results (from the past 24 hour(s))   Comprehensive Metabolic Panel    Collection Time: 09/21/20  9:20 PM    Specimen: Blood   Result Value Ref Range    Glucose 132 (H) 65 - 99 mg/dL    BUN 24 (H) 8 - 23 mg/dL    Creatinine 0.90 0.76 - 1.27 mg/dL    Sodium 142 136 - 145 mmol/L    Potassium 4.5 3.5 - 5.2 mmol/L    Chloride 105 98 - 107 mmol/L    CO2 31.1 (H) 22.0 - 29.0 mmol/L    Calcium 9.4 8.6 - 10.5 mg/dL    Total Protein 7.2 6.0 - 8.5 g/dL    Albumin 4.60 3.50 - 5.20 g/dL    ALT (SGPT) 30 1 - 41 U/L    AST (SGOT) 12 1 - 40 U/L    Alkaline Phosphatase 55 39 - 117 U/L    Total Bilirubin 0.8 0.0 - 1.2 mg/dL    eGFR Non African Amer 81 >60 mL/min/1.73    Globulin 2.6 gm/dL    A/G Ratio 1.8 g/dL    BUN/Creatinine Ratio 26.7 (H) 7.0 - 25.0    Anion Gap  5.9 5.0 - 15.0 mmol/L   Protime-INR    Collection Time: 09/21/20  9:20 PM    Specimen: Blood   Result Value Ref Range    Protime 24.1 (H) 11.7 - 14.2 Seconds    INR 2.23 (H) 0.90 - 1.10   CBC Auto Differential    Collection Time: 09/21/20  9:20 PM    Specimen: Blood   Result Value Ref Range    WBC 10.31 3.40 - 10.80 10*3/mm3    RBC 5.12 4.14 - 5.80 10*6/mm3    Hemoglobin 15.5 13.0 - 17.7 g/dL    Hematocrit 47.0 37.5 - 51.0 %    MCV 91.8 79.0 - 97.0 fL    MCH 30.3 26.6 - 33.0 pg    MCHC 33.0 31.5 - 35.7 g/dL    RDW 13.0 12.3 - 15.4 %    RDW-SD 43.9 37.0 - 54.0 fl    MPV 10.2 6.0 - 12.0 fL    Platelets 157 140 - 450 10*3/mm3    Neutrophil % 56.2 42.7 - 76.0 %    Lymphocyte % 33.5 19.6 - 45.3 %    Monocyte % 7.1 5.0 - 12.0 %    Eosinophil % 0.9 0.3 - 6.2 %    Basophil % 0.6 0.0 - 1.5 %    Immature Grans % 1.7 (H) 0.0 - 0.5 %    Neutrophils, Absolute 5.80 1.70 - 7.00 10*3/mm3    Lymphocytes, Absolute 3.45 (H) 0.70 - 3.10 10*3/mm3    Monocytes, Absolute 0.73 0.10 - 0.90 10*3/mm3    Eosinophils, Absolute 0.09 0.00 - 0.40 10*3/mm3    Basophils, Absolute 0.06 0.00 - 0.20 10*3/mm3    Immature Grans, Absolute 0.18 (H) 0.00 - 0.05 10*3/mm3    nRBC 0.0 0.0 - 0.2 /100 WBC         RADIOLOGY RESULTS  CT Head Without Contrast   Preliminary Result   No evidence of acute infarction or hemorrhage. There is   age-appropriate atrophy, mild small vessel ischemic disease and mild to   moderate vascular calcification appreciated. Further evaluation could be   performed with a MRI examination of the brain as indicated.               Radiation dose reduction techniques were utilized, including automated   exposure control and exposure modulation based on body size.              MRI Brain Without Contrast    (Results Pending)   CT Angiogram Head    (Results Pending)   CT Angiogram Neck    (Results Pending)         PROGRESS, DATA ANALYSIS, CONSULTS AND MEDICAL DECISION MAKING  All labs have been independently reviewed by me.  All radiology  "studies have been reviewed by me and discussed with radiologist dictating the report.  EKG's independently viewed and interpreted by me unless stated otherwise. Discussion below represents my analysis of pertinent findings related to patient's condition, differential diagnosis, treatment plan and final disposition.     ED Course as of Sep 21 2229   Mon Sep 21, 2020   2130 Discussed patient with NELSON Rutledge on-call for [facility].  She agrees to admit to Dr. West    [EW]      ED Course User Index  [EW] Charlotte Tatum APRN     DDX: visual changes, eye problem after head trauma, stroke, retinal detachment    MDM:  Pt to be admitted for further workup.  I do not suspect something acute, due to head trauma being 2 months ago.  However, I cannot rule out anything in ER.     Reviewed pt's history and workup with Dr. Gonzalez.  After a bedside evaluation, Dr. Gonzalez agrees with the plan of care.     Based on the patient's lab findings and presenting symptoms, the doctor and I feel it is appropriate to admit the patient for further management, evaluation, and treatment.  I have discussed this with the admitting team.  I have also discussed this with the patient/family.  They are in agreement with admission.          Disposition vitals:  /77   Pulse 60   Temp 97 °F (36.1 °C) (Tympanic)   Resp 16   Ht 182.9 cm (72\")   SpO2 97%   BMI 27.75 kg/m²       DIAGNOSIS  Final diagnoses:   Visual changes   Nonintractable headache, unspecified chronicity pattern, unspecified headache type       Admission     Charlotte Tatum APRN  09/21/20 2230    "

## 2020-09-22 NOTE — ED NOTES
"\"  Nursing report ED to floor  Doc Coyne  80 y.o.  male    HPI (triage note):   Chief Complaint   Patient presents with   • Eye Problem     bilateral       Admitting doctor:   Jozef West MD    Admitting diagnosis:   The primary encounter diagnosis was Visual changes. A diagnosis of Nonintractable headache, unspecified chronicity pattern, unspecified headache type was also pertinent to this visit.    Code status:   Current Code Status     Date Active Code Status Order ID Comments User Context       9/21/2020 2120 CPR [id number]  English, Britni ALBARADO, APRN ED     Advance Care Planning Activity      Questions for Current Code Status     Question Answer Comment    Code Status CPR     Medical Interventions (Level of Support Prior to Arrest) Full           Allergies:   Isosorbide nitrate, Novocain  [procaine], Penicillins, Propoxyphene, Cephalexin, Doxycycline, and Sulfa antibiotics    Weight:   There were no vitals filed for this visit.    Most recent vitals:   Vitals:    09/21/20 2139 09/21/20 2140 09/21/20 2207 09/21/20 2210   BP:  153/77     Pulse: 55 51 54 60   Resp:  16     Temp:       TempSrc:       SpO2: 95% 95% 97% 97%   Height:           Active LDAs/IV Access:   Lines, Drains & Airways    Active LDAs     Name:   Placement date:   Placement time:   Site:   Days:    Peripheral IV 09/21/20 2120 Right Antecubital   09/21/20 2120    Antecubital   less than 1                Labs (abnormal labs have a star):   Labs Reviewed   COMPREHENSIVE METABOLIC PANEL - Abnormal; Notable for the following components:       Result Value    Glucose 132 (*)     BUN 24 (*)     CO2 31.1 (*)     BUN/Creatinine Ratio 26.7 (*)     All other components within normal limits    Narrative:     GFR Normal >60  Chronic Kidney Disease <60  Kidney Failure <15     PROTIME-INR - Abnormal; Notable for the following components:    Protime 24.1 (*)     INR 2.23 (*)     All other components within normal limits   CBC WITH AUTO " DIFFERENTIAL - Abnormal; Notable for the following components:    Immature Grans % 1.7 (*)     Lymphocytes, Absolute 3.45 (*)     Immature Grans, Absolute 0.18 (*)     All other components within normal limits   COVID PRE-OP / PRE-PROCEDURE SCREENING ORDER (NO ISOLATION)    Narrative:     The following orders were created for panel order COVID PRE-OP / PRE-PROCEDURE SCREENING ORDER (NO ISOLATION) - Swab, Nasopharynx.  Procedure                               Abnormality         Status                     ---------                               -----------         ------                     Respiratory Panel PCR w/...[[id number]]                      In process                   Please view results for these tests on the individual orders.   RESPIRATORY PANEL PCR W/ COVID-19 (SARS-COV-2) ECHO/JODI/JENNIFER/PAD/COR/MAD IN-HOUSE, NP SWAB IN [facility], 3-4 HR TAT   POCT GLUCOSE FINGERSTICK   POCT GLUCOSE FINGERSTICK   POCT GLUCOSE FINGERSTICK   POCT GLUCOSE FINGERSTICK   CBC AND DIFFERENTIAL    Narrative:     The following orders were created for panel order CBC & Differential.  Procedure                               Abnormality         Status                     ---------                               -----------         ------                     CBC Auto Differential[[id number]]        Abnormal            Final result                 Please view results for these tests on the individual orders.       EKG:   No orders to display       Meds given in ED:   Medications   sodium chloride 0.9 % flush 10 mL (has no administration in time range)   sodium chloride 0.9 % flush 10 mL (has no administration in time range)   sodium chloride 0.9 % flush 10 mL (has no administration in time range)   aspirin tablet 325 mg (has no administration in time range)     Or   aspirin suppository 300 mg (has no administration in time range)   atorvastatin (LIPITOR) tablet 80 mg (80 mg Oral Given 9/21/20 2138)   ondansetron (ZOFRAN) injection 4 mg (has  no administration in time range)       Imaging results:  Ct Head Without Contrast    Result Date: 2020  No evidence of acute infarction or hemorrhage. There is age-appropriate atrophy, mild small vessel ischemic disease and mild to moderate vascular calcification appreciated. Further evaluation could be performed with a MRI examination of the brain as indicated.    Radiation dose reduction techniques were utilized, including automated exposure control and exposure modulation based on body size.         Ambulatory status:   Ad hossein    Social issues:   Social History     Socioeconomic History   • Marital status:      Spouse name: Not on file   • Number of children: Not on file   • Years of education: Not on file   • Highest education level: Not on file   Tobacco Use   • Smoking status: Former Smoker     Packs/day: 0.50     Types: Cigarettes     Quit date: 2020     Years since quittin.1   • Smokeless tobacco: Never Used   • Tobacco comment: QUIT 2 MONTHS AGO   Substance and Sexual Activity   • Alcohol use: Yes     Frequency: Never     Comment: OCCASIONALLY/ wine   • Drug use: No   • Sexual activity: Vidhya Turner RN  [phone]

## 2020-09-23 ENCOUNTER — TRANSITIONAL CARE MANAGEMENT TELEPHONE ENCOUNTER (OUTPATIENT)
Dept: CALL CENTER | Facility: HOSPITAL | Age: 80
End: 2020-09-23

## 2020-09-23 NOTE — PROGRESS NOTES
Case Management Discharge Note      Final Note: Pt was dc'd home    Provided Post Acute Provider List?: N/A  Provided Post Acute Provider Quality & Resource List?: N/A    Selected Continued Care - Discharged on 9/22/2020 Admission date: 9/21/2020 - Discharge disposition: Home or Self Care    Destination    No services have been selected for the patient.              Durable Medical Equipment    No services have been selected for the patient.              Dialysis/Infusion    No services have been selected for the patient.              Home Medical Care    No services have been selected for the patient.              Therapy    No services have been selected for the patient.              Community Resources    No services have been selected for the patient.                  Transportation Services  Private: Car    Final Discharge Disposition Code: 01 - home or self-care

## 2020-09-23 NOTE — OUTREACH NOTE
Call Center TCM Note      Responses   [facility] patient discharged from?  [place]   COVID-19 Test Status  Negative   Does the patient have one of the following disease processes/diagnoses(primary or secondary)?  Other   TCM attempt successful?  No   Unsuccessful attempts  Attempt 2          Luis Hogan RN    9/23/2020, 12:33 EDT

## 2020-09-23 NOTE — OUTREACH NOTE
Call Center TCM Note      Responses   [facility] patient discharged from?  [place]   COVID-19 Test Status  Negative   Does the patient have one of the following disease processes/diagnoses(primary or secondary)?  Other   TCM attempt successful?  No   Unsuccessful attempts  Attempt 1 [screened by smart blocker- not accepting call. ]          Luis Hogan RN    9/23/2020, 12:22 EDT

## 2020-09-24 ENCOUNTER — TRANSITIONAL CARE MANAGEMENT TELEPHONE ENCOUNTER (OUTPATIENT)
Dept: CALL CENTER | Facility: HOSPITAL | Age: 80
End: 2020-09-24

## 2020-09-24 NOTE — OUTREACH NOTE
Call Center TCM Note      Responses   [facility] patient discharged from?  [place]   COVID-19 Test Status  Negative   Does the patient have one of the following disease processes/diagnoses(primary or secondary)?  Other   TCM attempt successful?  No   Unsuccessful attempts  Attempt 3          Kathy Martinez MA    9/24/2020, 11:10 EDT

## 2020-09-29 ENCOUNTER — READMISSION MANAGEMENT (OUTPATIENT)
Dept: CALL CENTER | Facility: HOSPITAL | Age: 80
End: 2020-09-29

## 2020-09-29 NOTE — OUTREACH NOTE
Medical Week 2 Survey      Responses   [facility] patient discharged from?  [place]   COVID-19 Test Status  Negative   Does the patient have one of the following disease processes/diagnoses(primary or secondary)?  Other   Week 2 attempt successful?  No   Unsuccessful attempts  Attempt 1          Jessica Hardy RN

## 2020-09-30 ENCOUNTER — READMISSION MANAGEMENT (OUTPATIENT)
Dept: CALL CENTER | Facility: HOSPITAL | Age: 80
End: 2020-09-30

## 2020-09-30 ENCOUNTER — OFFICE VISIT (OUTPATIENT)
Dept: FAMILY MEDICINE CLINIC | Facility: CLINIC | Age: 80
End: 2020-09-30

## 2020-09-30 VITALS
BODY MASS INDEX: 26.37 KG/M2 | HEIGHT: 73 IN | WEIGHT: 199 LBS | DIASTOLIC BLOOD PRESSURE: 60 MMHG | HEART RATE: 56 BPM | OXYGEN SATURATION: 99 % | SYSTOLIC BLOOD PRESSURE: 122 MMHG | TEMPERATURE: 97.5 F

## 2020-09-30 DIAGNOSIS — R51.9 NONINTRACTABLE HEADACHE, UNSPECIFIED CHRONICITY PATTERN, UNSPECIFIED HEADACHE TYPE: ICD-10-CM

## 2020-09-30 DIAGNOSIS — R68.2 DRY MOUTH: ICD-10-CM

## 2020-09-30 DIAGNOSIS — H53.9 VISUAL CHANGES: Primary | ICD-10-CM

## 2020-09-30 PROCEDURE — 99495 TRANSJ CARE MGMT MOD F2F 14D: CPT | Performed by: NURSE PRACTITIONER

## 2020-09-30 RX ORDER — METOPROLOL TARTRATE 50 MG/1
TABLET, FILM COATED ORAL
Qty: 90 TABLET | Refills: 1 | Status: SHIPPED | OUTPATIENT
Start: 2020-09-30 | End: 2020-11-05 | Stop reason: SDUPTHER

## 2020-09-30 NOTE — OUTREACH NOTE
Medical Week 2 Survey      Responses   [facility] patient discharged from?  [place]   COVID-19 Test Status  Negative   Does the patient have one of the following disease processes/diagnoses(primary or secondary)?  Other   Week 2 attempt successful?  No   Call start time  1328   Unsuccessful attempts  Attempt 2          Asia Joshi RN

## 2020-09-30 NOTE — PATIENT INSTRUCTIONS
Symptoms improved today, he will monitor symptoms and can begin elavil if needed.   He will increase water intake, may try mouthwash for dry mouth,   Monitor symptoms at home, if any worsening symptoms call office or if weakness, dizziness advised ER.  Reviewed labs and imaging today.   Increase fluid intake, get plenty of rest.   .Patient agrees with plan of care and understands instructions. Call if worsening symptoms or any problems or concerns.

## 2020-09-30 NOTE — PROGRESS NOTES
Transitional Care Follow Up Visit  Subjective     Doc Coyne is a 80 y.o. male who presents for a transitional care management visit.    Within 48 business hours after discharge our office contacted him via telephone to coordinate his care and needs.      I reviewed and discussed the details of that call along with the discharge summary, hospital problems, inpatient lab results, inpatient diagnostic studies, and consultation reports with Doc.     Current outpatient and discharge medications have been reconciled for the patient.  Reviewed by: NELSON Rose      Date of TCM Phone Call 9/22/2020   [facility]   Date of Admission 9/21/2020   Date of Discharge 9/22/2020   Discharge Disposition Home or Self Care     Risk for Readmission (LACE) Score: 7 (9/22/2020  6:00 AM)      History of Present Illness   Course During Hospital Stay:  9/21/2020-9/22/2020.  Here today for hospital f/u, he was seen on 9/21/2020 for HA following head injury, sent to [facility] ER, please refer to H&P and d/c meena for details.   CT and CTA negative in hospital, he saw neurology in hospital, thought varient migraines, given elavil 25mg daily, he states he has not been taking this for visual change or HA. States this medication was too expensive. States visual changes and dizziness resolved today.   He c/o dry mouth, woke up with dry mouth this morning, admits to not drinking water. Denies any changes in medications.             The following portions of the patient's history were reviewed and updated as appropriate: allergies, current medications, past family history, past medical history, past social history, past surgical history and problem list.    Review of Systems   Constitutional: Negative for chills, fatigue, fever and unexpected weight change.   Respiratory: Negative for cough, chest tightness, shortness of breath and wheezing.    Cardiovascular: Negative for chest pain, palpitations and leg swelling.    Skin: Negative for color change, pallor and rash.   Neurological: Negative for dizziness, light-headedness and headaches.   Hematological: Does not bruise/bleed easily.   Psychiatric/Behavioral: Negative for suicidal ideas. The patient is not nervous/anxious.    All other systems reviewed and are negative.      Objective   Physical Exam  Vitals signs and nursing note reviewed.   Constitutional:       Appearance: He is well-developed.   HENT:      Head: Normocephalic.   Eyes:      Pupils: Pupils are equal, round, and reactive to light.   Cardiovascular:      Rate and Rhythm: Normal rate and regular rhythm.      Heart sounds: Normal heart sounds.   Pulmonary:      Effort: Pulmonary effort is normal.      Breath sounds: Normal breath sounds.   Skin:     General: Skin is warm and dry.   Neurological:      Mental Status: He is alert and oriented to person, place, and time.      Cranial Nerves: Cranial nerves are intact.      Sensory: Sensation is intact.      Motor: Motor function is intact.      Coordination: Coordination is intact.      Gait: Gait is intact.   Psychiatric:         Behavior: Behavior normal.         Judgment: Judgment normal.         Assessment/Plan   Doc was seen today for follow-up.    Diagnoses and all orders for this visit:    Visual changes    Dry mouth    Nonintractable headache, unspecified chronicity pattern, unspecified headache type      Symptoms improved today, he will monitor symptoms and can begin elavil if needed.   He will increase water intake, may try mouthwash for dry mouth,   Monitor symptoms at home, if any worsening symptoms call office or if weakness, dizziness advised ER.  Reviewed labs and imaging today.   Increase fluid intake, get plenty of rest.   .Patient agrees with plan of care and understands instructions. Call if worsening symptoms or any problems or concerns.

## 2020-10-08 ENCOUNTER — READMISSION MANAGEMENT (OUTPATIENT)
Dept: CALL CENTER | Facility: HOSPITAL | Age: 80
End: 2020-10-08

## 2020-10-08 NOTE — OUTREACH NOTE
Medical Week 3 Survey      Responses   [facility] patient discharged from?  [place]   Does the patient have one of the following disease processes/diagnoses(primary or secondary)?  Other   Week 3 attempt successful?  No   Unsuccessful attempts  Attempt 1          Asia Joshi RN

## 2020-10-13 ENCOUNTER — READMISSION MANAGEMENT (OUTPATIENT)
Dept: CALL CENTER | Facility: HOSPITAL | Age: 80
End: 2020-10-13

## 2020-10-13 NOTE — OUTREACH NOTE
Medical Week 3 Survey      Responses   [facility] patient discharged from?  [place]   Does the patient have one of the following disease processes/diagnoses(primary or secondary)?  Other   Week 3 attempt successful?  No   Unsuccessful attempts  Attempt 2          Asia Joshi RN

## 2020-10-31 ENCOUNTER — HOSPITAL ENCOUNTER (EMERGENCY)
Facility: HOSPITAL | Age: 80
Discharge: HOME | End: 2020-11-01
Attending: EMERGENCY MEDICINE | Admitting: EMERGENCY MEDICINE

## 2020-10-31 ENCOUNTER — APPOINTMENT (OUTPATIENT)
Dept: GENERAL RADIOLOGY | Facility: HOSPITAL | Age: 80
End: 2020-10-31

## 2020-10-31 DIAGNOSIS — R07.89 ATYPICAL CHEST PAIN: Primary | ICD-10-CM

## 2020-10-31 LAB
ALBUMIN SERPL-MCNC: 4.3 G/DL (ref 3.5–5.2)
ALBUMIN/GLOB SERPL: 2 G/DL
ALP SERPL-CCNC: 46 U/L (ref 39–117)
ALT SERPL W P-5'-P-CCNC: 18 U/L (ref 1–41)
ANION GAP SERPL CALCULATED.3IONS-SCNC: 10.4 MMOL/L (ref 5–15)
AST SERPL-CCNC: 14 U/L (ref 1–40)
BASOPHILS # BLD AUTO: 0.04 10*3/MM3 (ref 0–0.2)
BASOPHILS NFR BLD AUTO: 0.5 % (ref 0–1.5)
BILIRUB SERPL-MCNC: 0.8 MG/DL (ref 0–1.2)
BUN SERPL-MCNC: 19 MG/DL (ref 8–23)
BUN/CREAT SERPL: 25.7 (ref 7–25)
CALCIUM SPEC-SCNC: 8.9 MG/DL (ref 8.6–10.5)
CHLORIDE SERPL-SCNC: 106 MMOL/L (ref 98–107)
CO2 SERPL-SCNC: 22.6 MMOL/L (ref 22–29)
CREAT SERPL-MCNC: 0.74 MG/DL (ref 0.76–1.27)
DEPRECATED RDW RBC AUTO: 44.5 FL (ref 37–54)
EOSINOPHIL # BLD AUTO: 0.04 10*3/MM3 (ref 0–0.4)
EOSINOPHIL NFR BLD AUTO: 0.5 % (ref 0.3–6.2)
ERYTHROCYTE [DISTWIDTH] IN BLOOD BY AUTOMATED COUNT: 13.3 % (ref 12.3–15.4)
GFR SERPL CREATININE-BSD FRML MDRD: 102 ML/MIN/1.73
GLOBULIN UR ELPH-MCNC: 2.2 GM/DL
GLUCOSE SERPL-MCNC: 87 MG/DL (ref 65–99)
HCT VFR BLD AUTO: 41 % (ref 37.5–51)
HGB BLD-MCNC: 14.1 G/DL (ref 13–17.7)
IMM GRANULOCYTES # BLD AUTO: 0.08 10*3/MM3 (ref 0–0.05)
IMM GRANULOCYTES NFR BLD AUTO: 1.1 % (ref 0–0.5)
LYMPHOCYTES # BLD AUTO: 2.71 10*3/MM3 (ref 0.7–3.1)
LYMPHOCYTES NFR BLD AUTO: 36.9 % (ref 19.6–45.3)
MCH RBC QN AUTO: 31.4 PG (ref 26.6–33)
MCHC RBC AUTO-ENTMCNC: 34.4 G/DL (ref 31.5–35.7)
MCV RBC AUTO: 91.3 FL (ref 79–97)
MONOCYTES # BLD AUTO: 0.49 10*3/MM3 (ref 0.1–0.9)
MONOCYTES NFR BLD AUTO: 6.7 % (ref 5–12)
NEUTROPHILS NFR BLD AUTO: 3.98 10*3/MM3 (ref 1.7–7)
NEUTROPHILS NFR BLD AUTO: 54.3 % (ref 42.7–76)
NRBC BLD AUTO-RTO: 0 /100 WBC (ref 0–0.2)
PLATELET # BLD AUTO: 151 10*3/MM3 (ref 140–450)
PMV BLD AUTO: 10.2 FL (ref 6–12)
POTASSIUM SERPL-SCNC: 4 MMOL/L (ref 3.5–5.2)
PROT SERPL-MCNC: 6.5 G/DL (ref 6–8.5)
RBC # BLD AUTO: 4.49 10*6/MM3 (ref 4.14–5.8)
SODIUM SERPL-SCNC: 139 MMOL/L (ref 136–145)
TROPONIN T SERPL-MCNC: <0.01 NG/ML (ref 0–0.03)
WBC # BLD AUTO: 7.34 10*3/MM3 (ref 3.4–10.8)

## 2020-10-31 PROCEDURE — 99284 EMERGENCY DEPT VISIT MOD MDM: CPT

## 2020-10-31 PROCEDURE — 93005 ELECTROCARDIOGRAM TRACING: CPT

## 2020-10-31 PROCEDURE — 93005 ELECTROCARDIOGRAM TRACING: CPT | Performed by: EMERGENCY MEDICINE

## 2020-10-31 PROCEDURE — 93010 ELECTROCARDIOGRAM REPORT: CPT | Performed by: INTERNAL MEDICINE

## 2020-10-31 PROCEDURE — 80053 COMPREHEN METABOLIC PANEL: CPT | Performed by: NURSE PRACTITIONER

## 2020-10-31 PROCEDURE — 84484 ASSAY OF TROPONIN QUANT: CPT | Performed by: NURSE PRACTITIONER

## 2020-10-31 PROCEDURE — 71045 X-RAY EXAM CHEST 1 VIEW: CPT

## 2020-10-31 PROCEDURE — 85025 COMPLETE CBC W/AUTO DIFF WBC: CPT | Performed by: NURSE PRACTITIONER

## 2020-10-31 RX ORDER — SODIUM CHLORIDE 0.9 % (FLUSH) 0.9 %
10 SYRINGE (ML) INJECTION AS NEEDED
Status: DISCONTINUED | OUTPATIENT
Start: 2020-10-31 | End: 2020-11-01 | Stop reason: HOSPADM

## 2020-11-01 VITALS
BODY MASS INDEX: 28.44 KG/M2 | HEIGHT: 72 IN | WEIGHT: 210 LBS | TEMPERATURE: 97.9 F | SYSTOLIC BLOOD PRESSURE: 128 MMHG | RESPIRATION RATE: 18 BRPM | DIASTOLIC BLOOD PRESSURE: 76 MMHG | HEART RATE: 67 BPM | OXYGEN SATURATION: 97 %

## 2020-11-01 LAB
QT INTERVAL: 366 MS
TROPONIN T SERPL-MCNC: <0.01 NG/ML (ref 0–0.03)

## 2020-11-01 PROCEDURE — 84484 ASSAY OF TROPONIN QUANT: CPT | Performed by: NURSE PRACTITIONER

## 2020-11-01 RX ORDER — NITROGLYCERIN 0.4 MG/1
0.4 TABLET SUBLINGUAL
Qty: 12 TABLET | Refills: 0 | Status: SHIPPED | OUTPATIENT
Start: 2020-11-01 | End: 2021-06-14 | Stop reason: SDUPTHER

## 2020-11-01 NOTE — ED NOTES
Pt reports girlfriend broke up with him earlier today and began having severe chest pain and palpitations. Pt reports taking OTC pain reliever and his chest pain improved but it returned so he drove himself to the fire dept. Denies any pain at this time. Pt worried about how he will be getting home. Pt a&ox4, abc's intact, NAD noted.     Pt wearing mask and RN wearing mask and eyewear throughout encounter.        Jessica Cisneros, RN  10/31/20 0276

## 2020-11-01 NOTE — DISCHARGE INSTRUCTIONS
You have been evaluated today for chest pain.  Your evaluation was not suggestive of any emergent condition requiring medical intervention at this time.  Continue current home medications  Follow-up with your PMD or cardiologist, call to get appointment scheduled  Return to the ER immediately for worsening chest pain, palpitations, shortness of breath, persistent vomiting, dizziness, fainting or any new or worsening symptoms

## 2020-11-01 NOTE — ED PROVIDER NOTES
The GISELLA and I have discussed this patients history, physical exam, and treatment plan. I have reviewed the documentation and personally had a face to face interaction with the patient. I affirm the documentation and agree with the treatment and plan.  The following note describes my personal findings    This patient is a 80-year-old male presenting to the emergency room today with chest discomfort described as a tightness that occurred approximately 2 hours prior to ED presentation tonight.  The patient states that at the time of onset of the discomfort that he was having a verbal disagreement with his girlfriend.  All symptoms have currently resolved.    Exam: This patient is resting comfortably and in no distress, without gross neurological deficit.  He is afebrile with stable vital signs and nontoxic in appearance.  Cardiovascular exam shows a regular rate and rhythm without murmur.  Lungs are clear to auscultation bilaterally.    Plan: Full cardiac work-up in the emergency room including EKG, as well as 2 sets of cardiac enzymes and chest x-ray are unremarkable in the emergency department.  The patient has remained chest pain-free throughout the visit.  He will be stable at this point for discharge and will follow up with his primary care provider and cardiologist as indicated.      The patient was wearing a facemask upon entrance into the room and remained in such throughout their visit.  I was wearing PPE including a facemask, eye protection, as well as gloves at any point entering the room and throughout the visit     Percy Fernandez MD  11/01/20 [id number]

## 2020-11-01 NOTE — ED TRIAGE NOTES
Pt to ED from home with c/o chest pain that started 1 hour ago. Pt positive for hx of a-fib and is on Xarelto. EMS ekg negative for ST elevation, pt in a-fib on EKG with frequent PVCs.      Pt denies N/V/D fever, cough, SOB, or any recent exposures.    Pt masked upon arrival. This nurse wearing mask and goggles during entire encounter.

## 2020-11-01 NOTE — ED PROVIDER NOTES
EMERGENCY DEPARTMENT ENCOUNTER    Room Number:  12/12  Date of encounter:  11/1/2020  PCP: Shannan Aguirre APRN  Historian: Patient      PPE    Patient was placed in face mask in first look. Patient was wearing facemask when I entered the room and throughout our encounter. I wore full protective equipment throughout this patient encounter including a face mask, and gloves. Hand hygiene was performed before donning protective equipment and after removal when leaving the room.        HPI:  Chief Complaint: Chest pain  A complete HPI/ROS/PMH/PSH/SH/FH are unobtainable due to: Nothing    Context: Doc Coyne is a 80 y.o. male who arrives to the ED via EMS.  Patient presents with c/o mild, currently resolved, mid chest wall tightness that happened approximately 2 hours ago.  Patient states that he had gotten into a verbal disagreement with his girlfriend, was moving furniture and things out of the apartment when the pain started.  Patient states that he drove himself to the fire department and was then transported here per EMS.  Patient denies fever, chills, shortness of breath, nausea, vomiting, diaphoresis.  Patient states that nothing makes the symptoms better and nothing worsens symptoms.  He states he has past medical history of A. fib, diabetes, COPD, hypertension is currently taking Xarelto.  He states he is currently pain-free at this time.        PAST MEDICAL HISTORY  Active Ambulatory Problems     Diagnosis Date Noted   • Atrial fibrillation (CMS/[facility]) 02/08/2016   • Chest pain, atypical 02/08/2016   • Shortness of breath 02/08/2016   • Preop cardiovascular exam 02/08/2016   • Anticoagulated 05/14/2018   • Atherosclerotic heart disease of native coronary artery without angina pectoris 12/24/2019   • Hypertension 12/24/2019   • Benign prostatic hyperplasia 12/24/2019   • COPD (chronic obstructive pulmonary disease) (CMS/[facility]) 12/24/2019   • Type 2 diabetes mellitus, without long-term current use of  insulin (CMS/[facility]) 2019   • Bradycardia 2019   • Chest pain 2019   • TIA (transient ischemic attack) 2020   • Prostate CA (CMS/[facility]) 2020   • Visual changes 2020     Resolved Ambulatory Problems     Diagnosis Date Noted   • No Resolved Ambulatory Problems     Past Medical History:   Diagnosis Date   • Benign prostatic hypertrophy    • Diabetes mellitus (CMS/[facility])    • Difficulty walking    • Peripheral neuropathy    • Prostate cancer (CMS/[facility])          PAST SURGICAL HISTORY  Past Surgical History:   Procedure Laterality Date   • COLONOSCOPY     • HEMORRHOIDECTOMY     • PROSTATE SURGERY           FAMILY HISTORY  Family History   Problem Relation Age of Onset   • Hypertension Father    • Heart failure Father    • Hyperlipidemia Father    • Heart disease Father    • Heart attack Father    • Heart attack Sister    • Stroke Mother    • Heart disease Mother    • Diabetes Mother    • Kidney disease Maternal Grandmother          SOCIAL HISTORY  Social History     Socioeconomic History   • Marital status:      Spouse name: Not on file   • Number of children: Not on file   • Years of education: Not on file   • Highest education level: Not on file   Tobacco Use   • Smoking status: Former Smoker     Packs/day: 0.50     Types: Cigarettes     Quit date: 2020     Years since quittin.2   • Smokeless tobacco: Never Used   • Tobacco comment: QUIT 2 MONTHS AGO   Substance and Sexual Activity   • Alcohol use: Yes     Frequency: Never     Comment: OCCASIONALLY/ wine   • Drug use: No   • Sexual activity: Defer         ALLERGIES  Isosorbide nitrate, Novocain  [procaine], Penicillins, Propoxyphene, Cephalexin, Doxycycline, and Sulfa antibiotics        REVIEW OF SYSTEMS  Review of Systems     All systems reviewed and negative except for those discussed in HPI.        PHYSICAL EXAM    ED Triage Vitals [10/31/20 2100]   Temp Heart Rate Resp BP SpO2   97.9 °F (36.6 °C) 70 18 140/100 98 %        Physical Exam  GENERAL: Well appearing, non-toxic appearing, not distressed  HENT: normocephalic, atraumatic  EYES: no scleral icterus, PERRL  CV: regular rhythm, regular rate, no murmur  RESPIRATORY: normal effort, CTAB  ABDOMEN: soft   MUSCULOSKELETAL: no deformity, no calf tenderness or bilateral lower extremity edema  NEURO: alert, moves all extremities, follows commands, mental status normal/baseline  SKIN: warm, dry, no rash   Psych: Appropriate mood and affect  Nursing notes and vital signs reviewed      LAB RESULTS  Recent Results (from the past 24 hour(s))   ECG 12 Lead    Collection Time: 10/31/20  9:19 PM   Result Value Ref Range    QT Interval 366 ms   Comprehensive Metabolic Panel    Collection Time: 10/31/20 10:47 PM    Specimen: Blood   Result Value Ref Range    Glucose 87 65 - 99 mg/dL    BUN 19 8 - 23 mg/dL    Creatinine 0.74 (L) 0.76 - 1.27 mg/dL    Sodium 139 136 - 145 mmol/L    Potassium 4.0 3.5 - 5.2 mmol/L    Chloride 106 98 - 107 mmol/L    CO2 22.6 22.0 - 29.0 mmol/L    Calcium 8.9 8.6 - 10.5 mg/dL    Total Protein 6.5 6.0 - 8.5 g/dL    Albumin 4.30 3.50 - 5.20 g/dL    ALT (SGPT) 18 1 - 41 U/L    AST (SGOT) 14 1 - 40 U/L    Alkaline Phosphatase 46 39 - 117 U/L    Total Bilirubin 0.8 0.0 - 1.2 mg/dL    eGFR Non African Amer 102 >60 mL/min/1.73    Globulin 2.2 gm/dL    A/G Ratio 2.0 g/dL    BUN/Creatinine Ratio 25.7 (H) 7.0 - 25.0    Anion Gap 10.4 5.0 - 15.0 mmol/L   Troponin    Collection Time: 10/31/20 10:47 PM    Specimen: Blood   Result Value Ref Range    Troponin T <0.010 0.000 - 0.030 ng/mL   CBC Auto Differential    Collection Time: 10/31/20 10:47 PM    Specimen: Blood   Result Value Ref Range    WBC 7.34 3.40 - 10.80 10*3/mm3    RBC 4.49 4.14 - 5.80 10*6/mm3    Hemoglobin 14.1 13.0 - 17.7 g/dL    Hematocrit 41.0 37.5 - 51.0 %    MCV 91.3 79.0 - 97.0 fL    MCH 31.4 26.6 - 33.0 pg    MCHC 34.4 31.5 - 35.7 g/dL    RDW 13.3 12.3 - 15.4 %    RDW-SD 44.5 37.0 - 54.0 fl    MPV 10.2 6.0 -  12.0 fL    Platelets 151 140 - 450 10*3/mm3    Neutrophil % 54.3 42.7 - 76.0 %    Lymphocyte % 36.9 19.6 - 45.3 %    Monocyte % 6.7 5.0 - 12.0 %    Eosinophil % 0.5 0.3 - 6.2 %    Basophil % 0.5 0.0 - 1.5 %    Immature Grans % 1.1 (H) 0.0 - 0.5 %    Neutrophils, Absolute 3.98 1.70 - 7.00 10*3/mm3    Lymphocytes, Absolute 2.71 0.70 - 3.10 10*3/mm3    Monocytes, Absolute 0.49 0.10 - 0.90 10*3/mm3    Eosinophils, Absolute 0.04 0.00 - 0.40 10*3/mm3    Basophils, Absolute 0.04 0.00 - 0.20 10*3/mm3    Immature Grans, Absolute 0.08 (H) 0.00 - 0.05 10*3/mm3    nRBC 0.0 0.0 - 0.2 /100 WBC   Troponin    Collection Time: 11/01/20 12:49 AM    Specimen: Blood   Result Value Ref Range    Troponin T <0.010 0.000 - 0.030 ng/mL       Ordered the above labs and independently reviewed the results.      RADIOLOGY  Xr Chest 1 View    Result Date: 10/31/2020  PORTABLE CHEST RADIOGRAPH  HISTORY: Chest pain  COMPARISON: 12/24/2019  FINDINGS: There is mild cardiomegaly. There is no vascular congestion. There is evidence of prior granulomatous disease. No pneumothorax or pleural effusion is seen. No acute infiltrates are identified.      No acute findings.  This report was finalized on 10/31/2020 11:09 PM by Dr. Kandi Drew M.D.        I ordered the above noted radiological studies and viewed the images on the PACS system.       EKG      Independently viewed by me and interpreted by Dr Fernandez         MEDICAL RECORD REVIEW  Medical records reviewed in epic, patient was just discharged from the hospital September 22, 2020 after being admitted for visual changes and non-intractable headache.  Patient had a echo done September 22, 2020 that showed patient has an EF of 45%    PROCEDURES    Procedures        DIFFERENTIAL DIAGNOSIS  Differential diagnosis for chest pain include but are not limited to the following:  Anxiety, muscle strain, costochondritis, pleurisy, herpes zoster, MI, ACS, Aortic dissection, pneumonia, GERD        PROGRESS,  DATA ANALYSIS, CONSULTS, AND MEDICAL DECISION MAKING        ED Course as of Nov 01 0619   Sat Oct 31, 2020   2213 Discussed pertinent information from history and physical exam with patient.  Discussed differential diagnosis and plan for ED evaluation/work-up and treatment including labs, EKG and chest x-ray.  All questions answered.  Patient is agreeable with this plan.        [MS]   2330 I viewed the patient's chest imaging in PACS.  My interpretation is no infiltrate or bony abnormality is seen.  See dictation for official radiology interpretation.        [MS]   2357 Reviewed pt's history and workup with Dr. Fernandez.  After a bedside evaluation, they agree with the plan of care.          [MS]   Sun Nov 01, 2020   0618 Patient has been pain-free and has no current complaints while in the ER.  Patient has had 2 - troponins and unremarkable chest x-ray and been pain-free, I do not feel this is acute coronary syndrome, PE.  I feel this is more related to the fight that he had with his girlfriend.  Patient is stable and well-appearing for discharge.  He should follow-up with his audiologist next week.  Patient was given strict return to ER precautions.    [MS]      ED Course User Index  [MS] Millie Melgoza APRN     Discussed plan for discharge, as there is no emergent indication for admission. Pt/family is agreeable and understands need for follow up and repeat testing.  Pt is aware that discharge does not mean that nothing is wrong but it indicates no emergency is present that requires admission and they must continue care with follow-up as given below or physician of their choice.   Patient/Family voiced understanding of above instructions.  Patient discharged in stable condition.    DIAGNOSIS  Final diagnoses:   Atypical chest pain       FOLLOW UP   Shannan Aguirre, APRN  [address] [place] [address] [phone]    In 2 days      Joel Rosado MD  [address] [place]  [id number]  [phone]    Call in 2 days        RX     Medication List      Changed    * Nitrostat 0.4 MG SL tablet  Generic drug: nitroglycerin  What changed: Another medication with the same name was added. Make sure you understand how and when to take each.     * nitroglycerin 0.4 MG SL tablet  Commonly known as: NITROSTAT  Place 1 tablet under the tongue Every 5 (Five) Minutes As Needed for Chest Pain. Take no more than 3 doses in 15 minutes.  What changed: You were already taking a medication with the same name, and this prescription was added. Make sure you understand how and when to take each.         * This list has 2 medication(s) that are the same as other medications prescribed for you. Read the directions carefully, and ask your doctor or other care provider to review them with you.               Where to Get Your Medications      You can get these medications from any pharmacy    Bring a paper prescription for each of these medications  · nitroglycerin 0.4 MG SL tablet           MEDICATIONS GIVEN IN ED    Medications - No data to display        COURSE & MEDICAL DECISION MAKING  Any/All labs and Any/All Imaging studies that were ordered were reviewed and are noted above.  Results were reviewed/discussed with the patient and they were also made aware of online assess.   Pt also made aware that some labs, such as cultures, will not be resulted during ER visit and follow up with PMD is necessary.        Millie Melgoza, APRN  11/01/20 0620

## 2020-11-05 ENCOUNTER — OFFICE VISIT (OUTPATIENT)
Dept: FAMILY MEDICINE CLINIC | Facility: CLINIC | Age: 80
End: 2020-11-05

## 2020-11-05 VITALS
OXYGEN SATURATION: 97 % | BODY MASS INDEX: 26.6 KG/M2 | TEMPERATURE: 98 F | HEART RATE: 83 BPM | WEIGHT: 196.4 LBS | HEIGHT: 72 IN | DIASTOLIC BLOOD PRESSURE: 70 MMHG | SYSTOLIC BLOOD PRESSURE: 104 MMHG

## 2020-11-05 DIAGNOSIS — R07.9 CHEST PAIN, UNSPECIFIED TYPE: ICD-10-CM

## 2020-11-05 DIAGNOSIS — R53.83 FATIGUE, UNSPECIFIED TYPE: ICD-10-CM

## 2020-11-05 DIAGNOSIS — I48.0 PAROXYSMAL ATRIAL FIBRILLATION (HCC): Primary | ICD-10-CM

## 2020-11-05 DIAGNOSIS — E11.9 TYPE 2 DIABETES MELLITUS WITHOUT COMPLICATION, WITHOUT LONG-TERM CURRENT USE OF INSULIN (HCC): ICD-10-CM

## 2020-11-05 LAB
ALBUMIN SERPL-MCNC: 4.3 G/DL (ref 3.5–5.2)
ALBUMIN/GLOB SERPL: 1.7 G/DL
ALP SERPL-CCNC: 46 U/L (ref 39–117)
ALT SERPL W P-5'-P-CCNC: 23 U/L (ref 1–41)
ANION GAP SERPL CALCULATED.3IONS-SCNC: 6.5 MMOL/L (ref 5–15)
AST SERPL-CCNC: 19 U/L (ref 1–40)
BILIRUB SERPL-MCNC: 0.6 MG/DL (ref 0–1.2)
BUN SERPL-MCNC: 24 MG/DL (ref 8–23)
BUN/CREAT SERPL: 27 (ref 7–25)
CALCIUM SPEC-SCNC: 9.3 MG/DL (ref 8.6–10.5)
CHLORIDE SERPL-SCNC: 101 MMOL/L (ref 98–107)
CO2 SERPL-SCNC: 29.5 MMOL/L (ref 22–29)
CREAT SERPL-MCNC: 0.89 MG/DL (ref 0.76–1.27)
ERYTHROCYTE [DISTWIDTH] IN BLOOD BY AUTOMATED COUNT: 14.5 % (ref 12.3–15.4)
GFR SERPL CREATININE-BSD FRML MDRD: 82 ML/MIN/1.73
GLOBULIN UR ELPH-MCNC: 2.5 GM/DL
GLUCOSE SERPL-MCNC: 106 MG/DL (ref 65–99)
HCT VFR BLD AUTO: 43.1 % (ref 37.5–51)
HGB BLD-MCNC: 14.1 G/DL (ref 13–17.7)
LYMPHOCYTES # BLD AUTO: 2.8 10*3/MM3 (ref 0.7–3.1)
LYMPHOCYTES NFR BLD AUTO: 34.3 % (ref 19.6–45.3)
MCH RBC QN AUTO: 30.4 PG (ref 26.6–33)
MCHC RBC AUTO-ENTMCNC: 32.6 G/DL (ref 31.5–35.7)
MCV RBC AUTO: 93.2 FL (ref 79–97)
MONOCYTES # BLD AUTO: 0.2 10*3/MM3 (ref 0.1–0.9)
MONOCYTES NFR BLD AUTO: 2.4 % (ref 5–12)
NEUTROPHILS NFR BLD AUTO: 5.1 10*3/MM3 (ref 1.7–7)
NEUTROPHILS NFR BLD AUTO: 63.3 % (ref 42.7–76)
PLATELET # BLD AUTO: 169 10*3/MM3 (ref 140–450)
PMV BLD AUTO: 7.9 FL (ref 6–12)
POTASSIUM SERPL-SCNC: 4.5 MMOL/L (ref 3.5–5.2)
PROT SERPL-MCNC: 6.8 G/DL (ref 6–8.5)
RBC # BLD AUTO: 4.63 10*6/MM3 (ref 4.14–5.8)
SODIUM SERPL-SCNC: 137 MMOL/L (ref 136–145)
TSH SERPL DL<=0.05 MIU/L-ACNC: 0.76 UIU/ML (ref 0.27–4.2)
VIT B12 BLD-MCNC: 468 PG/ML (ref 211–946)
WBC # BLD AUTO: 8.1 10*3/MM3 (ref 3.4–10.8)

## 2020-11-05 PROCEDURE — 36415 COLL VENOUS BLD VENIPUNCTURE: CPT | Performed by: NURSE PRACTITIONER

## 2020-11-05 PROCEDURE — 82607 VITAMIN B-12: CPT | Performed by: NURSE PRACTITIONER

## 2020-11-05 PROCEDURE — 85025 COMPLETE CBC W/AUTO DIFF WBC: CPT | Performed by: NURSE PRACTITIONER

## 2020-11-05 PROCEDURE — 80053 COMPREHEN METABOLIC PANEL: CPT | Performed by: NURSE PRACTITIONER

## 2020-11-05 PROCEDURE — 84443 ASSAY THYROID STIM HORMONE: CPT | Performed by: NURSE PRACTITIONER

## 2020-11-05 PROCEDURE — 99214 OFFICE O/P EST MOD 30 MIN: CPT | Performed by: NURSE PRACTITIONER

## 2020-11-05 RX ORDER — METOPROLOL TARTRATE 50 MG/1
50 TABLET, FILM COATED ORAL DAILY
Qty: 30 TABLET | Refills: 1 | Status: SHIPPED | OUTPATIENT
Start: 2020-11-05 | End: 2021-02-19

## 2020-11-05 RX ORDER — DILTIAZEM HYDROCHLORIDE 120 MG/1
120 CAPSULE, COATED, EXTENDED RELEASE ORAL DAILY
Qty: 90 CAPSULE | Refills: 1 | Status: SHIPPED | OUTPATIENT
Start: 2020-11-05 | End: 2022-11-20

## 2020-11-05 RX ORDER — ROSUVASTATIN CALCIUM 10 MG/1
10 TABLET, COATED ORAL NIGHTLY
Qty: 90 TABLET | Refills: 1 | Status: SHIPPED | OUTPATIENT
Start: 2020-11-05 | End: 2022-01-12 | Stop reason: SDUPTHER

## 2020-11-05 RX ORDER — ROSUVASTATIN CALCIUM 10 MG/1
10 TABLET, COATED ORAL NIGHTLY
Qty: 30 TABLET | Refills: 1 | Status: SHIPPED | OUTPATIENT
Start: 2020-11-05 | End: 2020-11-05

## 2020-11-05 RX ORDER — LISINOPRIL 30 MG/1
30 TABLET ORAL DAILY
Qty: 30 TABLET | Refills: 2 | Status: SHIPPED | OUTPATIENT
Start: 2020-11-05 | End: 2021-06-04

## 2020-11-05 RX ORDER — DILTIAZEM HYDROCHLORIDE 120 MG/1
120 CAPSULE, COATED, EXTENDED RELEASE ORAL DAILY
Qty: 30 CAPSULE | Refills: 1 | Status: SHIPPED | OUTPATIENT
Start: 2020-11-05 | End: 2020-11-05

## 2020-11-05 NOTE — PATIENT INSTRUCTIONS
Labs today will call with results.   Cont current medications,   Will decrease metformin to 500mg once daily in am, recheck a1c in about 3 months.   Refer to cardiology will call with appt.   If any worsening symptoms, CP, advised ER.   Patient agrees with plan of care and understands instructions. Call if worsening symptoms or any problems or concerns.

## 2020-11-05 NOTE — PROGRESS NOTES
Subjective   Doc Coyne is a 80 y.o. male.     History of Present Illness   Here today for ER f/u, he went to [facility] ER 10/31/2020 for chest pain, please refer to H&P and d/c meena for details, he had chest pain while having verbal disagreement with his girlfriend, troponins and cxr negative. ekg showed afib, no acute changes, sees cardiology [facility], last visit 2/2020 Tiffanie Maya NP, for surgery clearance, sees Dr. Cade,states he saw about 6 months ago. He does not have f/u. With a fib, taking xarelto 20mg daily, crestor 10mg daily, lisinopril 30mg daily, metoprolol 50mg daily, cartia 120mg daily.   With DM, taking metformin 500mg bid. Last a1c 9/2020 6.1.   He denies any recent CP, he does have fatigue.               The following portions of the patient's history were reviewed and updated as appropriate: allergies, current medications, past family history, past medical history, past social history, past surgical history and problem list.    Review of Systems   Constitutional: Positive for fatigue. Negative for chills, diaphoresis and fever.   Respiratory: Negative for cough and shortness of breath.    Cardiovascular: Negative for chest pain, palpitations and leg swelling.   Musculoskeletal: Negative for arthralgias and myalgias.   Neurological: Negative for dizziness, light-headedness and headache.   All other systems reviewed and are negative.      Objective   Physical Exam  Vitals signs and nursing note reviewed.   Constitutional:       Appearance: He is well-developed.   HENT:      Head: Normocephalic.   Eyes:      Pupils: Pupils are equal, round, and reactive to light.   Cardiovascular:      Rate and Rhythm: Normal rate and regular rhythm.      Pulses:           Radial pulses are 2+ on the right side and 2+ on the left side.        Dorsalis pedis pulses are 2+ on the right side and 2+ on the left side.        Posterior tibial pulses are 2+ on the right side and 2+ on  the left side.      Heart sounds: Normal heart sounds.   Pulmonary:      Effort: Pulmonary effort is normal.      Breath sounds: Normal breath sounds.   Skin:     General: Skin is warm and dry.   Neurological:      Mental Status: He is alert and oriented to person, place, and time.   Psychiatric:         Behavior: Behavior normal.         Judgment: Judgment normal.           Assessment/Plan   Diagnoses and all orders for this visit:    1. Paroxysmal atrial fibrillation (CMS/[facility]) (Primary)  -     Ambulatory Referral to Cardiology  -     CBC & Differential  -     Comprehensive Metabolic Panel  -     TSH  -     Cancel: Hemoglobin A1c  -     Vitamin B12  -     CBC Auto Differential    2. Type 2 diabetes mellitus without complication, without long-term current use of insulin (CMS/[facility])  -     CBC & Differential  -     Comprehensive Metabolic Panel  -     TSH  -     Cancel: Hemoglobin A1c  -     Vitamin B12  -     CBC Auto Differential    3. Chest pain, unspecified type  -     Ambulatory Referral to Cardiology  -     CBC & Differential  -     Comprehensive Metabolic Panel  -     TSH  -     Cancel: Hemoglobin A1c  -     Vitamin B12  -     CBC Auto Differential    4. Fatigue, unspecified type  -     CBC & Differential  -     Comprehensive Metabolic Panel  -     TSH  -     Cancel: Hemoglobin A1c  -     Vitamin B12  -     CBC Auto Differential    Other orders  -     metFORMIN (GLUCOPHAGE) 500 MG tablet; Take 1 tablet by mouth Daily With Breakfast. TAKE 1 TABLET BY MOUTH EVERY 12 HOURS WITH FOOD  Dispense: 30 tablet; Refill: 3  -     lisinopril (PRINIVIL,ZESTRIL) 30 MG tablet; Take 1 tablet by mouth Daily.  Dispense: 30 tablet; Refill: 2  -     dilTIAZem CD (Cartia XT) 120 MG 24 hr capsule; Take 1 capsule by mouth Daily.  Dispense: 30 capsule; Refill: 1  -     metoprolol tartrate (LOPRESSOR) 50 MG tablet; Take 1 tablet by mouth Daily.  Dispense: 30 tablet; Refill: 1  -     rosuvastatin (CRESTOR) 10 MG tablet; Take 1 tablet by  mouth Every Night.  Dispense: 30 tablet; Refill: 1  -     rivaroxaban (Xarelto) 20 MG tablet; Take 1 tablet by mouth Daily.  Dispense: 30 tablet; Refill: 3      Labs today will call with results.   Cont current medications,   Will decrease metformin to 500mg once daily in am, recheck a1c in about 3 months.   Refer to cardiology will call with appt.   If any worsening symptoms, CP, advised ER.   Patient agrees with plan of care and understands instructions. Call if worsening symptoms or any problems or concerns.

## 2020-11-12 ENCOUNTER — OFFICE VISIT (OUTPATIENT)
Dept: CARDIOLOGY | Facility: CLINIC | Age: 80
End: 2020-11-12

## 2020-11-12 VITALS
WEIGHT: 199 LBS | SYSTOLIC BLOOD PRESSURE: 125 MMHG | HEART RATE: 65 BPM | BODY MASS INDEX: 26.95 KG/M2 | HEIGHT: 72 IN | DIASTOLIC BLOOD PRESSURE: 72 MMHG

## 2020-11-12 DIAGNOSIS — Z79.01 ANTICOAGULATED: ICD-10-CM

## 2020-11-12 DIAGNOSIS — I10 ESSENTIAL HYPERTENSION: ICD-10-CM

## 2020-11-12 DIAGNOSIS — R07.89 CHEST PAIN, ATYPICAL: Primary | ICD-10-CM

## 2020-11-12 DIAGNOSIS — I25.10 ATHEROSCLEROSIS OF NATIVE CORONARY ARTERY OF NATIVE HEART WITHOUT ANGINA PECTORIS: ICD-10-CM

## 2020-11-12 PROCEDURE — 99213 OFFICE O/P EST LOW 20 MIN: CPT | Performed by: INTERNAL MEDICINE

## 2020-11-12 PROCEDURE — 93000 ELECTROCARDIOGRAM COMPLETE: CPT | Performed by: INTERNAL MEDICINE

## 2020-11-12 NOTE — PROGRESS NOTES
CHEST PAIN, AFIB, REFERRED BY SUMAN DAMON.   Subjective:        Doc Coyne is a 80 y.o. male who here for follow up    CC  CP 2 WKS AGO 8/10  RELIEVED WITH NTG  HPI  80-year-old male with known history of coronary artery disease had an episode of retrosternal chest pains heaviness relieved with a sublingual nitroglycerin     Problems Addressed this Visit        Cardiovascular and Mediastinum    Atherosclerotic heart disease of native coronary artery without angina pectoris    Relevant Orders    Stress Test With Myocardial Perfusion One Day    Hypertension       Nervous and Auditory    Chest pain, atypical - Primary    Relevant Orders    Stress Test With Myocardial Perfusion One Day       Other    Anticoagulated      Diagnoses       Codes Comments    Chest pain, atypical    -  Primary ICD-10-CM: R07.89  ICD-9-CM: 786.59     Atherosclerosis of native coronary artery of native heart without angina pectoris     ICD-10-CM: I25.10  ICD-9-CM: 414.01     Essential hypertension     ICD-10-CM: I10  ICD-9-CM: 401.9     Anticoagulated     ICD-10-CM: Z79.01  ICD-9-CM: V58.61         .    The following portions of the patient's history were reviewed and updated as appropriate: allergies, current medications, past family history, past medical history, past social history, past surgical history and problem list.    Past Medical History:   Diagnosis Date   • Atherosclerotic heart disease of native coronary artery without angina pectoris    • Atrial fibrillation (CMS/HCC)    • Benign prostatic hypertrophy    • Chest pain    • COPD (chronic obstructive pulmonary disease) (CMS/[facility])    • Diabetes mellitus (CMS/HCC)    • Difficulty walking    • Hypertension    • Peripheral neuropathy    • Prostate cancer (CMS/[facility])      reports that he quit smoking about 3 months ago. His smoking use included cigarettes. He smoked 0.50 packs per day. He has never used smokeless tobacco. He reports current alcohol use. He reports that he does  "not use drugs.   Family History   Problem Relation Age of Onset   • Hypertension Father    • Heart failure Father    • Hyperlipidemia Father    • Heart disease Father    • Heart attack Father    • Heart attack Sister    • Stroke Mother    • Heart disease Mother    • Diabetes Mother    • Kidney disease Maternal Grandmother        Review of Systems  Constitutional: No wt loss, fever, fatigue  Gastrointestinal: No nausea, abdominal pain  Behavioral/Psych: No insomnia or anxiety   Cardiovascular chest pain and tightness in the chest  Objective:       Physical Exam  /72   Pulse 65   Ht 182.9 cm (72\")   Wt 90.3 kg (199 lb)   BMI 26.99 kg/m²   General appearance: No acute changes   Neck: Trachea midline; NECK, supple, no thyromegaly or lymphadenopathy   Lungs: Normal size and shape, normal breath sounds, equal distribution of air, no rales and rhonchi   CV: S1-S2 regular, no murmurs, no rub, no gallop   Abdomen: Soft, non-tender; no masses , no abnormal abdominal sounds   Extremities: No deformity , normal color , no peripheral edema   Skin: Normal temperature, turgor and texture; no rash, ulcers            ECG 12 Lead    Date/Time: 11/12/2020 1:57 PM  Performed by: Joel Rosado MD  Authorized by: Joel Rosado MD   Comparison: compared with previous ECG   Similar to previous ECG  Rhythm: atrial fibrillation  ST Flattening: all    Clinical impression: abnormal EKG              Echocardiogram:    Interpretation Summary    · Calculated EF = 45% Estimated EF was in agreement with the calculated EF. Ejection fraction appears to be 41 - 45%.  · Peak velocity of the flow distal to the aortic valve is 144.6 cm/s. Aortic valve maximum pressure gradient is 8.4 mmHg. Aortic valve mean pressure gradient is 5.1 mmHg. Aortic valve dimensionless index is 0.5 .  · Mild tricuspid valve regurgitation is present. Calculated right ventricular systolic pressure from tricuspid regurgitation is 0 mmHg.  · Mild " mitral valve regurgitation is present.  · Left atrial cavity size is moderately dilated.  · Mild aortic valve regurgitation is present.  · Right ventricular cavity is mildly dilated.  · The left ventricular cavity is mildly dilated.  · There is no evidence of pericardial effusion.         Current Outpatient Medications:   •  dilTIAZem CD (CARDIZEM CD) 120 MG 24 hr capsule, TAKE 1 CAPSULE BY MOUTH DAILY, Disp: 90 capsule, Rfl: 1  •  glucose blood test strip, Use as instructed, Disp: 100 each, Rfl: 12  •  lisinopril (PRINIVIL,ZESTRIL) 30 MG tablet, Take 1 tablet by mouth Daily., Disp: 30 tablet, Rfl: 2  •  metFORMIN (GLUCOPHAGE) 500 MG tablet, Take 1 tablet by mouth Daily With Breakfast., Disp: 30 tablet, Rfl: 3  •  metoprolol tartrate (LOPRESSOR) 50 MG tablet, Take 1 tablet by mouth Daily., Disp: 30 tablet, Rfl: 1  •  pantoprazole (PROTONIX) 40 MG EC tablet, Take 1 tablet by mouth Daily., Disp: 30 tablet, Rfl: 0  •  rivaroxaban (Xarelto) 20 MG tablet, Take 1 tablet by mouth Daily., Disp: 30 tablet, Rfl: 3  •  rosuvastatin (CRESTOR) 10 MG tablet, TAKE 1 TABLET BY MOUTH EVERY NIGHT, Disp: 90 tablet, Rfl: 1  •  tamsulosin (FLOMAX) 0.4 MG capsule 24 hr capsule, Take 1 capsule by mouth Daily., Disp: , Rfl:   •  Accu-Chek FastClix Lancets misc, , Disp: , Rfl:   •  Blood Glucose Monitoring Suppl (ACCU-CHEK TMOAS PLUS) w/Device kit, 1 kit 4 (Four) Times a Day., Disp: 1 kit, Rfl: 1  •  Fluzone High-Dose Quadrivalent 0.7 ML suspension prefilled syringe, TO BE ADMINISTERED BY PHARMACIST FOR IMMUNIZATION, Disp: , Rfl:   •  Lancets Misc. (ACCU-CHEK FASTCLIX LANCET) kit, 1 kit 4 (Four) Times a Day., Disp: 1 kit, Rfl: 1  •  nitroglycerin (NITROSTAT) 0.4 MG SL tablet, Place 1 tablet under the tongue Every 5 (Five) Minutes As Needed for Chest Pain. Take no more than 3 doses in 15 minutes., Disp: 12 tablet, Rfl: 0  •  NITROSTAT 0.4 MG SL tablet, Place 0.4 mg under the tongue Every 5 (Five) Minutes As Needed., Disp: , Rfl: 1    Assessment:        Patient Active Problem List   Diagnosis   • Atrial fibrillation (CMS/[facility])   • Chest pain, atypical   • Shortness of breath   • Preop cardiovascular exam   • Anticoagulated   • Atherosclerotic heart disease of native coronary artery without angina pectoris   • Hypertension   • Benign prostatic hyperplasia   • COPD (chronic obstructive pulmonary disease) (CMS/[facility])   • Type 2 diabetes mellitus, without long-term current use of insulin (CMS/[facility])   • Bradycardia   • Chest pain   • TIA (transient ischemic attack)   • Prostate CA (CMS/[facility])   • Visual changes               Plan:            ICD-10-CM ICD-9-CM   1. Chest pain, atypical  R07.89 786.59   2. Atherosclerosis of native coronary artery of native heart without angina pectoris  I25.10 414.01   3. Essential hypertension  I10 401.9   4. Anticoagulated  Z79.01 V58.61     1. Chest pain, atypical  Considering the patient's symptoms as well as clinical situation and  EKG findings, along with cardiac risk factors, ischemic workup is necessary to rule out ischemic cardiomyopathy, stress induced arrhythmias, and functional capacity for diagnosis as well as prognostic consideration    - Stress Test With Myocardial Perfusion One Day    2. Atherosclerosis of native coronary artery of native heart without angina pectoris    - Stress Test With Myocardial Perfusion One Day    3. Essential hypertension  Blood pressure under control    4. Anticoagulated  Pros and cons as well as indication of the anticoagulation has been explained to the patient in detail    There are no obvious complications at this stage    Risk of  the bleedings has been explained    Need for the regular blood workup and adjust the dose has been explained    Need for proper follow-up on anticoagulation also has been explained         LEXISCAN SEE IN 1 WK WILL NEED CATH  COUNSELING:    Doc Islas was given to patient for the following topics: diagnostic results, risk factor  reductions, impressions, risks and benefits of treatment options and importance of treatment compliance .       SMOKING COUNSELING:    [unfilled]    Dictated using Dragon dictation

## 2020-11-16 ENCOUNTER — HOSPITAL ENCOUNTER (OUTPATIENT)
Dept: CARDIOLOGY | Facility: HOSPITAL | Age: 80
Discharge: HOME | End: 2020-11-16

## 2020-11-16 VITALS
HEIGHT: 72 IN | HEART RATE: 68 BPM | WEIGHT: 190 LBS | DIASTOLIC BLOOD PRESSURE: 60 MMHG | BODY MASS INDEX: 25.73 KG/M2 | RESPIRATION RATE: 16 BRPM | SYSTOLIC BLOOD PRESSURE: 110 MMHG

## 2020-11-16 LAB
BH CV STRESS BP STAGE 1: NORMAL
BH CV STRESS COMMENTS STAGE 1: NORMAL
BH CV STRESS DOSE REGADENOSON STAGE 1: 0.4
BH CV STRESS DURATION MIN STAGE 1: 2
BH CV STRESS DURATION SEC STAGE 1: 44
BH CV STRESS GRADE STAGE 1: 0
BH CV STRESS HR STAGE 1: 82
BH CV STRESS METS STAGE 1: 1.5
BH CV STRESS PROTOCOL 1: NORMAL
BH CV STRESS RECOVERY BP: NORMAL MMHG
BH CV STRESS RECOVERY HR: 70 BPM
BH CV STRESS SPEED STAGE 1: 0.7
BH CV STRESS STAGE 1: 1
MAXIMAL PREDICTED HEART RATE: 141 BPM
PERCENT MAX PREDICTED HR: 58.16 %
STRESS BASELINE BP: NORMAL MMHG
STRESS BASELINE HR: 64 BPM
STRESS PERCENT HR: 68 %
STRESS POST ESTIMATED WORKLOAD: 1.5 METS
STRESS POST EXERCISE DUR MIN: 2 MIN
STRESS POST EXERCISE DUR SEC: 44 SEC
STRESS POST PEAK BP: NORMAL MMHG
STRESS POST PEAK HR: 82 BPM
STRESS TARGET HR: 120 BPM

## 2020-11-16 PROCEDURE — 93017 CV STRESS TEST TRACING ONLY: CPT

## 2020-11-16 PROCEDURE — 78452 HT MUSCLE IMAGE SPECT MULT: CPT | Performed by: INTERNAL MEDICINE

## 2020-11-16 PROCEDURE — 25010000002 REGADENOSON 0.4 MG/5ML SOLUTION: Performed by: INTERNAL MEDICINE

## 2020-11-16 PROCEDURE — 0 TECHNETIUM SESTAMIBI: Performed by: INTERNAL MEDICINE

## 2020-11-16 PROCEDURE — 93016 CV STRESS TEST SUPVJ ONLY: CPT | Performed by: INTERNAL MEDICINE

## 2020-11-16 PROCEDURE — 93018 CV STRESS TEST I&R ONLY: CPT | Performed by: INTERNAL MEDICINE

## 2020-11-16 PROCEDURE — 78452 HT MUSCLE IMAGE SPECT MULT: CPT

## 2020-11-16 PROCEDURE — A9500 TC99M SESTAMIBI: HCPCS | Performed by: INTERNAL MEDICINE

## 2020-11-16 RX ADMIN — TECHNETIUM TC 99M SESTAMIBI 1 DOSE: 1 INJECTION INTRAVENOUS at 11:15

## 2020-11-16 RX ADMIN — REGADENOSON 0.4 MG: 0.08 INJECTION, SOLUTION INTRAVENOUS at 11:15

## 2020-11-16 RX ADMIN — TECHNETIUM TC 99M SESTAMIBI 1 DOSE: 1 INJECTION INTRAVENOUS at 08:15

## 2020-11-19 RX ORDER — METHYLPREDNISOLONE ACETATE 80 MG/ML
80 INJECTION, SUSPENSION INTRA-ARTICULAR; INTRALESIONAL; INTRAMUSCULAR; SOFT TISSUE
Status: COMPLETED | OUTPATIENT
Start: 2020-09-11 | End: 2020-09-11

## 2020-11-23 ENCOUNTER — OFFICE VISIT (OUTPATIENT)
Dept: CARDIOLOGY | Facility: CLINIC | Age: 80
End: 2020-11-23

## 2020-11-23 VITALS — BODY MASS INDEX: 26.14 KG/M2 | HEIGHT: 72 IN | WEIGHT: 193 LBS

## 2020-11-23 DIAGNOSIS — I48.0 PAROXYSMAL ATRIAL FIBRILLATION (HCC): ICD-10-CM

## 2020-11-23 DIAGNOSIS — I25.10 ATHEROSCLEROSIS OF NATIVE CORONARY ARTERY OF NATIVE HEART WITHOUT ANGINA PECTORIS: Primary | ICD-10-CM

## 2020-11-23 DIAGNOSIS — R00.1 BRADYCARDIA: ICD-10-CM

## 2020-11-23 DIAGNOSIS — Z79.01 ANTICOAGULATED: ICD-10-CM

## 2020-11-23 DIAGNOSIS — I10 ESSENTIAL HYPERTENSION: ICD-10-CM

## 2020-11-23 PROCEDURE — 99442 PR PHYS/QHP TELEPHONE EVALUATION 11-20 MIN: CPT | Performed by: INTERNAL MEDICINE

## 2020-11-23 NOTE — PROGRESS NOTES
You have chosen to receive care through a telephone visit. Do you consent to use a telephone visit for your medical care today? Yes    Results   Subjective:        Doc Coyne is a 80 y.o. male who here for follow up    CC  TELE  HPI  80-year-old with known history of the coronary artery disease atrial fibrillation hypertension bradycardia here for the follow-up with no complaints of chest pains tightness heaviness or the pressure sensation     Problems Addressed this Visit        Cardiovascular and Mediastinum    Atrial fibrillation (CMS/HCC)    Atherosclerotic heart disease of native coronary artery without angina pectoris - Primary    Hypertension    Bradycardia       Other    Anticoagulated      Diagnoses       Codes Comments    Atherosclerosis of native coronary artery of native heart without angina pectoris    -  Primary ICD-10-CM: I25.10  ICD-9-CM: 414.01     Paroxysmal atrial fibrillation (CMS/HCC)     ICD-10-CM: I48.0  ICD-9-CM: 427.31     Bradycardia     ICD-10-CM: R00.1  ICD-9-CM: 427.89     Essential hypertension     ICD-10-CM: I10  ICD-9-CM: 401.9     Anticoagulated     ICD-10-CM: Z79.01  ICD-9-CM: V58.61         .    The following portions of the patient's history were reviewed and updated as appropriate: allergies, current medications, past family history, past medical history, past social history, past surgical history and problem list.    Past Medical History:   Diagnosis Date   • Atherosclerotic heart disease of native coronary artery without angina pectoris    • Atrial fibrillation (CMS/HCC)    • Benign prostatic hypertrophy    • Chest pain    • COPD (chronic obstructive pulmonary disease) (CMS/[facility])    • Diabetes mellitus (CMS/[facility])    • Difficulty walking    • Hypertension    • Peripheral neuropathy    • Prostate cancer (CMS/[facility])      reports that he quit smoking about 3 months ago. His smoking use included cigarettes. He smoked 0.50 packs per day. He has never used smokeless tobacco. He  reports current alcohol use. He reports that he does not use drugs.   Family History   Problem Relation Age of Onset   • Hypertension Father    • Heart failure Father    • Hyperlipidemia Father    • Heart disease Father    • Heart attack Father    • Heart attack Sister    • Stroke Mother    • Heart disease Mother    • Diabetes Mother    • Kidney disease Maternal Grandmother        Review of Systems  Constitutional: No wt loss, fever, fatigue  Gastrointestinal: No nausea, abdominal pain  Behavioral/Psych: No insomnia or anxiety   Cardiovascular no chest pains or tightness in the chest  Objective:       Physical Exam  TELEPHONE    Procedures      Echocardiogram:        Current Outpatient Medications:   •  Accu-Chek FastClix Lancets misc, , Disp: , Rfl:   •  Blood Glucose Monitoring Suppl (ACCU-CHEK TOMAS PLUS) w/Device kit, 1 kit 4 (Four) Times a Day., Disp: 1 kit, Rfl: 1  •  dilTIAZem CD (CARDIZEM CD) 120 MG 24 hr capsule, TAKE 1 CAPSULE BY MOUTH DAILY, Disp: 90 capsule, Rfl: 1  •  Fluzone High-Dose Quadrivalent 0.7 ML suspension prefilled syringe, TO BE ADMINISTERED BY PHARMACIST FOR IMMUNIZATION, Disp: , Rfl:   •  glucose blood test strip, Use as instructed, Disp: 100 each, Rfl: 12  •  Lancets Misc. (ACCU-CHEK FASTCLIX LANCET) kit, 1 kit 4 (Four) Times a Day., Disp: 1 kit, Rfl: 1  •  lisinopril (PRINIVIL,ZESTRIL) 30 MG tablet, Take 1 tablet by mouth Daily., Disp: 30 tablet, Rfl: 2  •  metFORMIN (GLUCOPHAGE) 500 MG tablet, Take 1 tablet by mouth Daily With Breakfast., Disp: 30 tablet, Rfl: 3  •  metoprolol tartrate (LOPRESSOR) 50 MG tablet, Take 1 tablet by mouth Daily., Disp: 30 tablet, Rfl: 1  •  nitroglycerin (NITROSTAT) 0.4 MG SL tablet, Place 1 tablet under the tongue Every 5 (Five) Minutes As Needed for Chest Pain. Take no more than 3 doses in 15 minutes., Disp: 12 tablet, Rfl: 0  •  pantoprazole (PROTONIX) 40 MG EC tablet, Take 1 tablet by mouth Daily., Disp: 30 tablet, Rfl: 0  •  rivaroxaban (Xarelto) 20 MG  tablet, Take 1 tablet by mouth Daily., Disp: 30 tablet, Rfl: 3  •  rosuvastatin (CRESTOR) 10 MG tablet, TAKE 1 TABLET BY MOUTH EVERY NIGHT, Disp: 90 tablet, Rfl: 1  •  tamsulosin (FLOMAX) 0.4 MG capsule 24 hr capsule, Take 1 capsule by mouth Daily., Disp: , Rfl:    Assessment:        Patient Active Problem List   Diagnosis   • Atrial fibrillation (CMS/HCC)   • Chest pain, atypical   • Shortness of breath   • Preop cardiovascular exam   • Anticoagulated   • Atherosclerotic heart disease of native coronary artery without angina pectoris   • Hypertension   • Benign prostatic hyperplasia   • COPD (chronic obstructive pulmonary disease) (CMS/HCC)   • Type 2 diabetes mellitus, without long-term current use of insulin (CMS/HCC)   • Bradycardia   • Chest pain   • TIA (transient ischemic attack)   • Prostate CA (CMS/HCC)   • Visual changes     Interpretation Summary       · Findings consistent with a normal ECG stress test.  · Myocardial perfusion imaging indicates a normal myocardial perfusion study with no evidence of ischemia.  · Impressions are consistent with a low risk study.  · Thin apex normal variant     Interpretation Summary    · Calculated EF = 45% Estimated EF was in agreement with the calculated EF. Ejection fraction appears to be 41 - 45%.  · Peak velocity of the flow distal to the aortic valve is 144.6 cm/s. Aortic valve maximum pressure gradient is 8.4 mmHg. Aortic valve mean pressure gradient is 5.1 mmHg. Aortic valve dimensionless index is 0.5 .  · Mild tricuspid valve regurgitation is present. Calculated right ventricular systolic pressure from tricuspid regurgitation is 0 mmHg.  · Mild mitral valve regurgitation is present.  · Left atrial cavity size is moderately dilated.  · Mild aortic valve regurgitation is present.  · Right ventricular cavity is mildly dilated.  · The left ventricular cavity is mildly dilated.  · There is no evidence of pericardial effusion.                  Plan:             ICD-10-CM ICD-9-CM   1. Atherosclerosis of native coronary artery of native heart without angina pectoris  I25.10 414.01   2. Paroxysmal atrial fibrillation (CMS/HCC)  I48.0 427.31   3. Bradycardia  R00.1 427.89   4. Essential hypertension  I10 401.9   5. Anticoagulated  Z79.01 V58.61     1. Atherosclerosis of native coronary artery of native heart without angina pectoris  No angina pectoris    2. Paroxysmal atrial fibrillation (CMS/HCC)  Controlled    3. Bradycardia  Controlled    4. Essential hypertension  Stable    5. Anticoagulated  Continue current treatment       Specificity and sensitivity of the stress test/ cardiac workup has been explained. Pt has been explained if  Symptoms continue please go to ER, and further w/p will be required.    Also explained this does not rule out coronary artery disease or the future events, continue to emphasize on risk reductions for coronary artery disease    Pt also advised to contact PCP for other causes of symptoms  6 MONTHS    This patient has consented to a telehealth visit via telephone. The visit was scheduled as a telephone visit to comply with patient safety concerns in accordance with CDC recommendations.  All vitals recorded within this visit are reported by the patient.  I spent 12 minutes in total including but not limited to the 12 minutes spent in direct conversation with this patient.     COUNSELING:    Doc Islas was given to patient for the following topics: diagnostic results, risk factor reductions, impressions, risks and benefits of treatment options and importance of treatment compliance .       SMOKING COUNSELING:    [unfilled]    Dictated using Dragon dictation

## 2020-12-11 ENCOUNTER — CLINICAL SUPPORT (OUTPATIENT)
Dept: ORTHOPEDIC SURGERY | Facility: CLINIC | Age: 80
End: 2020-12-11

## 2020-12-11 VITALS — BODY MASS INDEX: 26.28 KG/M2 | WEIGHT: 194 LBS | TEMPERATURE: 98.6 F | HEIGHT: 72 IN

## 2020-12-11 DIAGNOSIS — M17.10 PRIMARY LOCALIZED OSTEOARTHROSIS OF LOWER LEG, UNSPECIFIED LATERALITY: Primary | ICD-10-CM

## 2020-12-11 PROCEDURE — 20610 DRAIN/INJ JOINT/BURSA W/O US: CPT | Performed by: ORTHOPAEDIC SURGERY

## 2020-12-11 RX ORDER — METHYLPREDNISOLONE ACETATE 80 MG/ML
80 INJECTION, SUSPENSION INTRA-ARTICULAR; INTRALESIONAL; INTRAMUSCULAR; SOFT TISSUE
Status: COMPLETED | OUTPATIENT
Start: 2020-12-11 | End: 2020-12-11

## 2020-12-11 RX ADMIN — METHYLPREDNISOLONE ACETATE 80 MG: 80 INJECTION, SUSPENSION INTRA-ARTICULAR; INTRALESIONAL; INTRAMUSCULAR; SOFT TISSUE at 11:52

## 2020-12-11 RX ADMIN — METHYLPREDNISOLONE ACETATE 80 MG: 80 INJECTION, SUSPENSION INTRA-ARTICULAR; INTRALESIONAL; INTRAMUSCULAR; SOFT TISSUE at 11:53

## 2020-12-11 NOTE — PROGRESS NOTES
Patient: Doc Coyne  YOB: 1940  Date of Service: 12/11/2020    Chief Complaints:   Chief Complaint   Patient presents with   • Left Knee - Follow-up   • Right Knee - Follow-up       Subjective:    History of Present Illness: Pt is seen in the office today with complaints of bilateral knee pain the last time 3 months ago we inject him he did great his symptoms have returned somewhat he would like to repeat his injections is not interested in surgical  Chief Complaint   Patient presents with   • Left Knee - Follow-up   • Right Knee - Follow-up   .          Allergies:   Allergies   Allergen Reactions   • Isosorbide Nitrate    • Novocain  [Procaine]    • Penicillins    • Propoxyphene    • Cephalexin Rash   • Doxycycline Rash   • Sulfa Antibiotics Rash       Medications:   Home Medications:  Current Outpatient Medications on File Prior to Visit   Medication Sig   • Accu-Chek FastClix Lancets misc    • Blood Glucose Monitoring Suppl (ACCU-CHEK TOMAS PLUS) w/Device kit 1 kit 4 (Four) Times a Day.   • dilTIAZem CD (CARDIZEM CD) 120 MG 24 hr capsule TAKE 1 CAPSULE BY MOUTH DAILY   • Fluzone High-Dose Quadrivalent 0.7 ML suspension prefilled syringe TO BE ADMINISTERED BY PHARMACIST FOR IMMUNIZATION   • glucose blood test strip Use as instructed   • Lancets Misc. (ACCU-CHEK FASTCLIX LANCET) kit 1 kit 4 (Four) Times a Day.   • lisinopril (PRINIVIL,ZESTRIL) 30 MG tablet Take 1 tablet by mouth Daily.   • metFORMIN (GLUCOPHAGE) 500 MG tablet Take 1 tablet by mouth Daily With Breakfast.   • metoprolol tartrate (LOPRESSOR) 50 MG tablet Take 1 tablet by mouth Daily.   • nitroglycerin (NITROSTAT) 0.4 MG SL tablet Place 1 tablet under the tongue Every 5 (Five) Minutes As Needed for Chest Pain. Take no more than 3 doses in 15 minutes.   • pantoprazole (PROTONIX) 40 MG EC tablet Take 1 tablet by mouth Daily.   • rivaroxaban (Xarelto) 20 MG tablet Take 1 tablet by mouth Daily.   • rosuvastatin (CRESTOR) 10 MG  tablet TAKE 1 TABLET BY MOUTH EVERY NIGHT   • tamsulosin (FLOMAX) 0.4 MG capsule 24 hr capsule Take 1 capsule by mouth Daily.     No current facility-administered medications on file prior to visit.      Current Medications:  Scheduled Meds:  Continuous Infusions:No current facility-administered medications for this visit.     PRN Meds:.    I have reviewed the patient's medical history in detail and updated the computerized patient record.  Review and summarization of old records include:    Past Medical History:   Diagnosis Date   • Atherosclerotic heart disease of native coronary artery without angina pectoris    • Atrial fibrillation (CMS/[facility])    • Benign prostatic hypertrophy    • Chest pain    • COPD (chronic obstructive pulmonary disease) (CMS/[facility])    • Diabetes mellitus (CMS/HCC)    • Difficulty walking    • Hypertension    • Peripheral neuropathy    • Prostate cancer (CMS/[facility])         Past Surgical History:   Procedure Laterality Date   • COLONOSCOPY     • HEMORRHOIDECTOMY     • PROSTATE SURGERY          Social History     Occupational History   • Not on file   Tobacco Use   • Smoking status: Former Smoker     Packs/day: 0.50     Types: Cigarettes     Quit date: 2020     Years since quittin.3   • Smokeless tobacco: Never Used   • Tobacco comment: QUIT 2 MONTHS AGO   Substance and Sexual Activity   • Alcohol use: Yes     Frequency: Never     Comment: OCCASIONALLY/ wine   • Drug use: No   • Sexual activity: Defer      Social History     Social History Narrative   • Not on file        Family History   Problem Relation Age of Onset   • Hypertension Father    • Heart failure Father    • Hyperlipidemia Father    • Heart disease Father    • Heart attack Father    • Heart attack Sister    • Stroke Mother    • Heart disease Mother    • Diabetes Mother    • Kidney disease Maternal Grandmother        ROS: 14 point review of systems was performed and was negative except for documented findings in HPI and today's  encounter.     Allergies:   Allergies   Allergen Reactions   • Isosorbide Nitrate    • Novocain  [Procaine]    • Penicillins    • Propoxyphene    • Cephalexin Rash   • Doxycycline Rash   • Sulfa Antibiotics Rash     Constitutional:  Denies fever, shaking or chills   Eyes:  Denies change in visual acuity   HENT:  Denies nasal congestion or sore throat   Respiratory:  Denies cough or shortness of breath   Cardiovascular:  Denies chest pain or severe LE edema   GI:  Denies abdominal pain, nausea, vomiting, bloody stools or diarrhea   Musculoskeletal:  Numbness, tingling, or loss of motor function only as noted above in history of present illness.  : Denies painful urination or hematuria  Integument:  Denies rash, lesion or ulceration   Neurologic:  Denies headache or focal weakness  Endocrine:  Denies lymphadenopathy  Psych:  Denies confusion or change in mental status   Hem:  Denies active bleeding      Physical Exam: 80 y.o. male  Wt Readings from Last 3 Encounters:   12/11/20 88 kg (194 lb)   11/23/20 87.5 kg (193 lb)   11/16/20 86.2 kg (190 lb)       Body mass index is 26.31 kg/m².  Facility age limit for growth percentiles is 20 years.  Vitals:    12/11/20 1055   Temp: 98.6 °F (37 °C)     Vital signs reviewed.   General Appearance:    Alert, cooperative, in no acute distress                    Ortho exam    Exam is unchanged         .time    Assessment: Bilateral knee DJD    Plan: Injections  Follow up as indicated.  Ice, elevate, and rest as needed.  Discussed conservative measures of pain control including ice, bracing.  Also talked about the importance of strengthening and maintaining ideal body weight    Leticia Aguirre M.D.    Large Joint Arthrocentesis: R knee  Date/Time: 12/11/2020 11:52 AM  Consent given by: patient  Site marked: site marked  Timeout: Immediately prior to procedure a time out was called to verify the correct patient, procedure, equipment, support staff and site/side marked as required    Supporting Documentation  Indications: pain   Procedure Details  Location: knee - R knee  Preparation: Patient was prepped and draped in the usual sterile fashion  Needle gauge: 21G.  Approach: anteromedial  Medications administered: 4 mL lidocaine (cardiac); 80 mg methylPREDNISolone acetate 80 MG/ML  Patient tolerance: patient tolerated the procedure well with no immediate complications    Large Joint Arthrocentesis: L knee  Date/Time: 12/11/2020 11:53 AM  Consent given by: patient  Site marked: site marked  Timeout: Immediately prior to procedure a time out was called to verify the correct patient, procedure, equipment, support staff and site/side marked as required   Supporting Documentation  Indications: pain   Procedure Details  Location: knee - L knee  Preparation: Patient was prepped and draped in the usual sterile fashion  Needle gauge: 21G.  Approach: anteromedial  Medications administered: 2 mL lidocaine (cardiac); 80 mg methylPREDNISolone acetate 80 MG/ML  Patient tolerance: patient tolerated the procedure well with no immediate complications

## 2020-12-11 NOTE — PROGRESS NOTES
Patient: Doc Coyne  YOB: 1940  Date of Service: 12/11/2020    Chief Complaints: No chief complaint on file.      Subjective:    History of Present Illness: Pt is seen in the office today with complaints of No chief complaint on file.  .          Allergies:   Allergies   Allergen Reactions   • Isosorbide Nitrate    • Novocain  [Procaine]    • Penicillins    • Propoxyphene    • Cephalexin Rash   • Doxycycline Rash   • Sulfa Antibiotics Rash       Medications:   Home Medications:  Current Outpatient Medications on File Prior to Visit   Medication Sig   • Accu-Chek FastClix Lancets misc    • Blood Glucose Monitoring Suppl (ACCU-CHEK TOMAS PLUS) w/Device kit 1 kit 4 (Four) Times a Day.   • dilTIAZem CD (CARDIZEM CD) 120 MG 24 hr capsule TAKE 1 CAPSULE BY MOUTH DAILY   • Fluzone High-Dose Quadrivalent 0.7 ML suspension prefilled syringe TO BE ADMINISTERED BY PHARMACIST FOR IMMUNIZATION   • glucose blood test strip Use as instructed   • Lancets Misc. (ACCU-CHEK FASTCLIX LANCET) kit 1 kit 4 (Four) Times a Day.   • lisinopril (PRINIVIL,ZESTRIL) 30 MG tablet Take 1 tablet by mouth Daily.   • metFORMIN (GLUCOPHAGE) 500 MG tablet Take 1 tablet by mouth Daily With Breakfast.   • metoprolol tartrate (LOPRESSOR) 50 MG tablet Take 1 tablet by mouth Daily.   • nitroglycerin (NITROSTAT) 0.4 MG SL tablet Place 1 tablet under the tongue Every 5 (Five) Minutes As Needed for Chest Pain. Take no more than 3 doses in 15 minutes.   • pantoprazole (PROTONIX) 40 MG EC tablet Take 1 tablet by mouth Daily.   • rivaroxaban (Xarelto) 20 MG tablet Take 1 tablet by mouth Daily.   • rosuvastatin (CRESTOR) 10 MG tablet TAKE 1 TABLET BY MOUTH EVERY NIGHT   • tamsulosin (FLOMAX) 0.4 MG capsule 24 hr capsule Take 1 capsule by mouth Daily.     No current facility-administered medications on file prior to visit.      Current Medications:  Scheduled Meds:  Continuous Infusions:No current facility-administered medications for this  visit.     PRN Meds:.    I have reviewed the patient's medical history in detail and updated the computerized patient record.  Review and summarization of old records include:    Past Medical History:   Diagnosis Date   • Atherosclerotic heart disease of native coronary artery without angina pectoris    • Atrial fibrillation (CMS/HCC)    • Benign prostatic hypertrophy    • Chest pain    • COPD (chronic obstructive pulmonary disease) (CMS/HCC)    • Diabetes mellitus (CMS/HCC)    • Difficulty walking    • Hypertension    • Peripheral neuropathy    • Prostate cancer (CMS/HCC)         Past Surgical History:   Procedure Laterality Date   • COLONOSCOPY     • HEMORRHOIDECTOMY     • PROSTATE SURGERY          Social History     Occupational History   • Not on file   Tobacco Use   • Smoking status: Former Smoker     Packs/day: 0.50     Types: Cigarettes     Quit date: 2020     Years since quittin.3   • Smokeless tobacco: Never Used   • Tobacco comment: QUIT 2 MONTHS AGO   Substance and Sexual Activity   • Alcohol use: Yes     Frequency: Never     Comment: OCCASIONALLY/ wine   • Drug use: No   • Sexual activity: Defer      Social History     Social History Narrative   • Not on file        Family History   Problem Relation Age of Onset   • Hypertension Father    • Heart failure Father    • Hyperlipidemia Father    • Heart disease Father    • Heart attack Father    • Heart attack Sister    • Stroke Mother    • Heart disease Mother    • Diabetes Mother    • Kidney disease Maternal Grandmother        ROS: 14 point review of systems was performed and was negative except for documented findings in HPI and today's encounter.     Allergies:   Allergies   Allergen Reactions   • Isosorbide Nitrate    • Novocain  [Procaine]    • Penicillins    • Propoxyphene    • Cephalexin Rash   • Doxycycline Rash   • Sulfa Antibiotics Rash     Constitutional:  Denies fever, shaking or chills   Eyes:  Denies change in visual acuity   HENT:   Denies nasal congestion or sore throat   Respiratory:  Denies cough or shortness of breath   Cardiovascular:  Denies chest pain or severe LE edema   GI:  Denies abdominal pain, nausea, vomiting, bloody stools or diarrhea   Musculoskeletal:  Numbness, tingling, or loss of motor function only as noted above in history of present illness.  : Denies painful urination or hematuria  Integument:  Denies rash, lesion or ulceration   Neurologic:  Denies headache or focal weakness  Endocrine:  Denies lymphadenopathy  Psych:  Denies confusion or change in mental status   Hem:  Denies active bleeding      Physical Exam: 80 y.o. male  Wt Readings from Last 3 Encounters:   11/23/20 87.5 kg (193 lb)   11/16/20 86.2 kg (190 lb)   11/12/20 90.3 kg (199 lb)     *** HELP TEXT ***    This SmartLink requires parameters. Parameters are variables that are added to the SmartLink name to request specific information. The parameter for .lastht is the number of readings to display.    For example: .lastht[4    In this example, the SmartLink displays the last four encounter readings.    There is no height or weight on file to calculate BMI.  No height and weight on file for this encounter.  There were no vitals filed for this visit.  Vital signs reviewed.   General Appearance:    Alert, cooperative, in no acute distress                    Ortho exam             .time    Assessment:     Plan:   Follow up as indicated.  Ice, elevate, and rest as needed.  Discussed conservative measures of pain control including ice, bracing.  Also talked about the importance of strengthening and maintaining ideal body weight    Leticia Aguirre M.D.      Large Joint Arthrocentesis: R knee  Date/Time: 12/11/2020 8:24 AM  Consent given by: patient  Site marked: site marked  Timeout: Immediately prior to procedure a time out was called to verify the correct patient, procedure, equipment, support staff and site/side marked as required   Supporting  Documentation  Indications: pain and joint swelling   Procedure Details  Location: knee - R knee  Preparation: Patient was prepped and draped in the usual sterile fashion  Needle size: 22 G  Approach: anterolateral  Medications administered: 80 mg methylPREDNISolone acetate 80 MG/ML; 4 mL lidocaine (cardiac)  Patient tolerance: patient tolerated the procedure well with no immediate complications    Large Joint Arthrocentesis: L knee  Date/Time: 12/11/2020 8:24 AM  Consent given by: patient  Site marked: site marked  Timeout: Immediately prior to procedure a time out was called to verify the correct patient, procedure, equipment, support staff and site/side marked as required   Supporting Documentation  Indications: pain and joint swelling   Procedure Details  Location: knee - L knee  Preparation: Patient was prepped and draped in the usual sterile fashion  Needle size: 22 G  Approach: anterolateral  Medications administered: 80 mg methylPREDNISolone acetate 80 MG/ML; 4 mL lidocaine (cardiac)  Patient tolerance: patient tolerated the procedure well with no immediate complications

## 2021-01-04 ENCOUNTER — OFFICE VISIT (OUTPATIENT)
Dept: FAMILY MEDICINE CLINIC | Facility: CLINIC | Age: 81
End: 2021-01-04

## 2021-01-04 VITALS
OXYGEN SATURATION: 95 % | TEMPERATURE: 96.8 F | HEIGHT: 72 IN | SYSTOLIC BLOOD PRESSURE: 120 MMHG | HEART RATE: 68 BPM | RESPIRATION RATE: 19 BRPM | BODY MASS INDEX: 25.87 KG/M2 | WEIGHT: 191 LBS | DIASTOLIC BLOOD PRESSURE: 70 MMHG

## 2021-01-04 DIAGNOSIS — E11.9 TYPE 2 DIABETES MELLITUS WITHOUT COMPLICATION, WITHOUT LONG-TERM CURRENT USE OF INSULIN (HCC): ICD-10-CM

## 2021-01-04 DIAGNOSIS — I10 ESSENTIAL HYPERTENSION: Primary | ICD-10-CM

## 2021-01-04 DIAGNOSIS — R53.83 FATIGUE, UNSPECIFIED TYPE: ICD-10-CM

## 2021-01-04 DIAGNOSIS — I48.0 PAROXYSMAL ATRIAL FIBRILLATION (HCC): ICD-10-CM

## 2021-01-04 PROCEDURE — 99214 OFFICE O/P EST MOD 30 MIN: CPT | Performed by: NURSE PRACTITIONER

## 2021-01-04 NOTE — PROGRESS NOTES
"Chief Complaint  Follow-up, joint pain shoulders (hard to hold coffee cup), Fatigue (needs something to help his energy level), and Diabetes (would llike freestyle altagracia)    Subjective    History of Present Illness      Doc Coyne presents to [facility] for   History of Present Illness   Here today for f/u, c/o bilat shoulder pain, states pain with lifting arm or holding coffee cup, states started about 2 months ago, he does have hx of arthritis, denies any injury. He is right handed. He does see ortho Dr. Aguirre for knee pain. He does not check shoulder injections.   He also c/o fatigue, started about 2 months ago.   With DM, taking metformin 500mg daily, last a1c 9/2020 6.1, states BS at home usually 100s, he does not see endo, checking BS once daily.   With HTN, taking lisinopril 30mg daily, metoprolol 50mg daily, diltiazem 120mg daily. With afib, sees cardiology Dr. Cade. He does check BP at home, usually 120s/80s.  With HLD, taking crestor 10mg daily. Tolerating well.     Objective   Vital Signs:   /70 (BP Location: Left arm, Patient Position: Sitting)   Pulse 68   Temp 96.8 °F (36 °C) (Infrared)   Resp 19   Ht 182.9 cm (72\")   Wt 86.6 kg (191 lb)   SpO2 95%   BMI 25.90 kg/m²     Physical Exam   Result Review :     Most Recent A1C    HGBA1C Most Recent 9/22/20   Hemoglobin A1C 6.10 (A)   (A) Abnormal value                      Assessment and Plan    Problem List Items Addressed This Visit        Cardiac and Vasculature    Atrial fibrillation (CMS/HCC)    Relevant Orders    CBC & Differential    Comprehensive Metabolic Panel    Lipid Panel    TSH    Hemoglobin A1c    Vitamin B12    Hypertension - Primary    Relevant Orders    CBC & Differential    Comprehensive Metabolic Panel    Lipid Panel    TSH    Hemoglobin A1c    Vitamin B12       Endocrine and Metabolic    Type 2 diabetes mellitus, without long-term current use of insulin (CMS/HCC)    " Relevant Orders    CBC & Differential    Comprehensive Metabolic Panel    Lipid Panel    TSH    Hemoglobin A1c    Vitamin B12      Other Visit Diagnoses     Fatigue, unspecified type        Relevant Orders    CBC & Differential    Comprehensive Metabolic Panel    Lipid Panel    TSH    Hemoglobin A1c    Vitamin B12        I spent 20 minutes caring for Doc on this date of service. This time includes time spent by me in the following activities:preparing for the visit, reviewing tests, obtaining and/or reviewing a separately obtained history, performing a medically appropriate examination and/or evaluation , counseling and educating the patient/family/caregiver, ordering medications, tests, or procedures and documenting information in the medical record  Follow Up   Return if symptoms worsen or fail to improve.  Patient was given instructions and counseling regarding his condition or for health maintenance advice. Please see specific information pulled into the AVS if appropriate.     He will return for fasting labs, lab unavailable in office today.   He will cont f/u with ortho, he will call for appt as he is established.   Cont f/u with cardiology as scheduled.   Cont to monitor BS and BP at home, call with problems.   Avoid over head lifting, may try tylenol OTC as needed for pain.   .Patient agrees with plan of care and understands instructions. Call if worsening symptoms or any problems or concerns.

## 2021-01-04 NOTE — PATIENT INSTRUCTIONS
He will return for fasting labs, lab unavailable in office today.   He will cont f/u with ortho, he will call for appt as he is established.   Avoid over head lifting, may try tylenol OTC as needed for pain.   .Patient agrees with plan of care and understands instructions. Call if worsening symptoms or any problems or concerns.

## 2021-01-11 ENCOUNTER — LAB (OUTPATIENT)
Dept: FAMILY MEDICINE CLINIC | Facility: CLINIC | Age: 81
End: 2021-01-11

## 2021-01-11 DIAGNOSIS — E11.9 TYPE 2 DIABETES MELLITUS WITHOUT COMPLICATION, WITHOUT LONG-TERM CURRENT USE OF INSULIN (HCC): ICD-10-CM

## 2021-01-11 DIAGNOSIS — R53.83 FATIGUE, UNSPECIFIED TYPE: ICD-10-CM

## 2021-01-11 DIAGNOSIS — I48.0 PAROXYSMAL ATRIAL FIBRILLATION (HCC): ICD-10-CM

## 2021-01-11 DIAGNOSIS — I10 ESSENTIAL HYPERTENSION: ICD-10-CM

## 2021-01-11 LAB
ALBUMIN SERPL-MCNC: 4.4 G/DL (ref 3.5–5.2)
ALBUMIN/GLOB SERPL: 1.8 G/DL
ALP SERPL-CCNC: 46 U/L (ref 39–117)
ALT SERPL W P-5'-P-CCNC: 37 U/L (ref 1–41)
ANION GAP SERPL CALCULATED.3IONS-SCNC: 6.5 MMOL/L (ref 5–15)
AST SERPL-CCNC: 22 U/L (ref 1–40)
BILIRUB SERPL-MCNC: 0.5 MG/DL (ref 0–1.2)
BUN SERPL-MCNC: 21 MG/DL (ref 8–23)
BUN/CREAT SERPL: 27.6 (ref 7–25)
CALCIUM SPEC-SCNC: 9.3 MG/DL (ref 8.6–10.5)
CHLORIDE SERPL-SCNC: 103 MMOL/L (ref 98–107)
CHOLEST SERPL-MCNC: 131 MG/DL (ref 0–200)
CO2 SERPL-SCNC: 29.5 MMOL/L (ref 22–29)
CREAT SERPL-MCNC: 0.76 MG/DL (ref 0.76–1.27)
ERYTHROCYTE [DISTWIDTH] IN BLOOD BY AUTOMATED COUNT: 13.9 % (ref 12.3–15.4)
GFR SERPL CREATININE-BSD FRML MDRD: 99 ML/MIN/1.73
GLOBULIN UR ELPH-MCNC: 2.5 GM/DL
GLUCOSE SERPL-MCNC: 102 MG/DL (ref 65–99)
HBA1C MFR BLD: 5.95 % (ref 4.8–5.6)
HCT VFR BLD AUTO: 39.8 % (ref 37.5–51)
HDLC SERPL-MCNC: 50 MG/DL (ref 40–60)
HGB BLD-MCNC: 13.2 G/DL (ref 13–17.7)
LDLC SERPL CALC-MCNC: 66 MG/DL (ref 0–100)
LDLC/HDLC SERPL: 1.31 {RATIO}
LYMPHOCYTES # BLD AUTO: 3.1 10*3/MM3 (ref 0.7–3.1)
LYMPHOCYTES NFR BLD AUTO: 40.9 % (ref 19.6–45.3)
MCH RBC QN AUTO: 31.4 PG (ref 26.6–33)
MCHC RBC AUTO-ENTMCNC: 33.1 G/DL (ref 31.5–35.7)
MCV RBC AUTO: 94.7 FL (ref 79–97)
MONOCYTES # BLD AUTO: 0.3 10*3/MM3 (ref 0.1–0.9)
MONOCYTES NFR BLD AUTO: 4.3 % (ref 5–12)
NEUTROPHILS NFR BLD AUTO: 4.2 10*3/MM3 (ref 1.7–7)
NEUTROPHILS NFR BLD AUTO: 54.8 % (ref 42.7–76)
PLATELET # BLD AUTO: 140 10*3/MM3 (ref 140–450)
PMV BLD AUTO: 8.2 FL (ref 6–12)
POTASSIUM SERPL-SCNC: 4.3 MMOL/L (ref 3.5–5.2)
PROT SERPL-MCNC: 6.9 G/DL (ref 6–8.5)
RBC # BLD AUTO: 4.2 10*6/MM3 (ref 4.14–5.8)
SODIUM SERPL-SCNC: 139 MMOL/L (ref 136–145)
TRIGL SERPL-MCNC: 77 MG/DL (ref 0–150)
TSH SERPL DL<=0.05 MIU/L-ACNC: 1.55 UIU/ML (ref 0.27–4.2)
VIT B12 BLD-MCNC: 467 PG/ML (ref 211–946)
VLDLC SERPL-MCNC: 15 MG/DL (ref 5–40)
WBC # BLD AUTO: 7.6 10*3/MM3 (ref 3.4–10.8)

## 2021-01-11 PROCEDURE — 36415 COLL VENOUS BLD VENIPUNCTURE: CPT | Performed by: NURSE PRACTITIONER

## 2021-01-11 PROCEDURE — 85025 COMPLETE CBC W/AUTO DIFF WBC: CPT | Performed by: NURSE PRACTITIONER

## 2021-01-11 PROCEDURE — 82607 VITAMIN B-12: CPT | Performed by: NURSE PRACTITIONER

## 2021-01-11 PROCEDURE — 83036 HEMOGLOBIN GLYCOSYLATED A1C: CPT | Performed by: NURSE PRACTITIONER

## 2021-01-11 PROCEDURE — 80053 COMPREHEN METABOLIC PANEL: CPT | Performed by: NURSE PRACTITIONER

## 2021-01-11 PROCEDURE — 84443 ASSAY THYROID STIM HORMONE: CPT | Performed by: NURSE PRACTITIONER

## 2021-01-11 PROCEDURE — 80061 LIPID PANEL: CPT | Performed by: NURSE PRACTITIONER

## 2021-02-12 ENCOUNTER — TELEPHONE (OUTPATIENT)
Dept: CARDIOLOGY | Facility: CLINIC | Age: 81
End: 2021-02-12

## 2021-02-12 NOTE — TELEPHONE ENCOUNTER
----- Message from Che Gonzalez sent at 2/11/2021 12:00 PM EST -----  Regarding: needs samples  Contact: [phone]  Patient needs xarelto samples please call when ready to  he is completely out would like to  today

## 2021-02-12 NOTE — TELEPHONE ENCOUNTER
LVM AT 4:15 ON Thursday 2/11 AND AGAIN AT 10:15 ON Friday 2/12 THAT HIS SAMPLES ARE READY TO BE PICKED UP.

## 2021-02-19 RX ORDER — METOPROLOL TARTRATE 50 MG/1
TABLET, FILM COATED ORAL
Qty: 90 TABLET | Refills: 1 | Status: SHIPPED | OUTPATIENT
Start: 2021-02-19 | End: 2021-04-01 | Stop reason: SDUPTHER

## 2021-03-01 ENCOUNTER — OFFICE VISIT (OUTPATIENT)
Dept: FAMILY MEDICINE CLINIC | Facility: CLINIC | Age: 81
End: 2021-03-01

## 2021-03-01 VITALS
OXYGEN SATURATION: 99 % | TEMPERATURE: 96.8 F | DIASTOLIC BLOOD PRESSURE: 80 MMHG | HEIGHT: 72 IN | HEART RATE: 79 BPM | SYSTOLIC BLOOD PRESSURE: 132 MMHG | WEIGHT: 195 LBS | BODY MASS INDEX: 26.41 KG/M2

## 2021-03-01 DIAGNOSIS — I48.0 PAROXYSMAL ATRIAL FIBRILLATION (HCC): ICD-10-CM

## 2021-03-01 DIAGNOSIS — E11.9 TYPE 2 DIABETES MELLITUS WITHOUT COMPLICATION, WITHOUT LONG-TERM CURRENT USE OF INSULIN (HCC): ICD-10-CM

## 2021-03-01 DIAGNOSIS — J30.9 ALLERGIC RHINITIS, UNSPECIFIED SEASONALITY, UNSPECIFIED TRIGGER: ICD-10-CM

## 2021-03-01 DIAGNOSIS — R42 DIZZINESS: Primary | ICD-10-CM

## 2021-03-01 PROCEDURE — 99214 OFFICE O/P EST MOD 30 MIN: CPT | Performed by: NURSE PRACTITIONER

## 2021-03-01 RX ORDER — BLOOD-GLUCOSE METER
1 EACH MISCELLANEOUS 2 TIMES DAILY
Qty: 1 KIT | Refills: 1 | Status: SHIPPED | OUTPATIENT
Start: 2021-03-01 | End: 2021-08-29

## 2021-03-01 RX ORDER — LANCETS
1 EACH MISCELLANEOUS 2 TIMES DAILY
Qty: 100 EACH | Refills: 0 | Status: SHIPPED | OUTPATIENT
Start: 2021-03-01 | End: 2021-08-29

## 2021-03-01 RX ORDER — LORATADINE 10 MG/1
10 TABLET ORAL DAILY
Qty: 30 TABLET | Refills: 1 | Status: SHIPPED | OUTPATIENT
Start: 2021-03-01 | End: 2021-08-29

## 2021-03-01 NOTE — PATIENT INSTRUCTIONS
New glucometer sent to pharmacy, advised to monitor BS at home, call with elevated or low readings.   He will call to make sooner f/u with cardiology as he is established.   He will return for labs, lab unavailable today.   Move positions slowly.   If any worsening symptoms, dizziness advised ER.   Trial claritin 10mg daily as needed for ear pain.   Patient agrees with plan of care and understands instructions. Call if worsening symptoms or any problems or concerns.

## 2021-03-01 NOTE — PROGRESS NOTES
"Chief Complaint  Dizziness (x 2 months)    Subjective          Doc Coyne presents to [facility]  History of Present Illness  C/o dizziness, started about 2 months ago, states he has pressure in forehead, states he feels like he loses balance, denies syncope, denies falls, states symptoms intermittent. He does feel like room spinning, denies hx of vertigo. He denies SOA, CP. He does have bilat ear pain, pressure. States episodes lasting a few seconds.   With DM, taking metformin 500mg daily, last a1c 1/2021 5.9, he does not check BS at home, states machine not working.   With HTN, taking lisinopril 30mg daily, diltiazem 120mg daily, metoprolol 50mg bid, with afib, taking xarelto, sees cardiology Dr. Cade. Denies palpitations.   He plans to move back home to [place] in may to retire. He stopped smoking.      Objective   Vital Signs:   /80 (BP Location: Left arm, Patient Position: Sitting, Cuff Size: Adult)   Pulse 79   Temp 96.8 °F (36 °C) (Infrared)   Ht 182.8 cm (71.97\")   Wt 88.5 kg (195 lb)   SpO2 99%   BMI 26.47 kg/m²     Physical Exam  Vitals signs and nursing note reviewed.   Constitutional:       Appearance: He is well-developed.   HENT:      Head: Normocephalic.      Right Ear: Tympanic membrane and ear canal normal.      Left Ear: Tympanic membrane and ear canal normal.   Eyes:      Pupils: Pupils are equal, round, and reactive to light.   Cardiovascular:      Rate and Rhythm: Normal rate and regular rhythm.      Pulses:           Radial pulses are 2+ on the right side and 2+ on the left side.        Dorsalis pedis pulses are 2+ on the right side and 2+ on the left side.        Posterior tibial pulses are 2+ on the right side and 2+ on the left side.      Heart sounds: Normal heart sounds.   Pulmonary:      Effort: Pulmonary effort is normal.      Breath sounds: Normal breath sounds.   Skin:     General: Skin is warm and dry.   Neurological:      " Mental Status: He is alert and oriented to person, place, and time.      Cranial Nerves: Cranial nerves are intact.      Sensory: Sensation is intact.      Motor: Motor function is intact.      Coordination: Coordination is intact.      Gait: Gait is intact.   Psychiatric:         Behavior: Behavior normal.         Judgment: Judgment normal.        Result Review :                 Assessment and Plan    Diagnoses and all orders for this visit:    1. Dizziness (Primary)  -     CBC & Differential; Future  -     Comprehensive Metabolic Panel; Future  -     TSH; Future  -     Vitamin B12; Future  -     Hemoglobin A1c; Future  -     Urinalysis With Microscopic - Urine, Clean Catch; Future    2. Paroxysmal atrial fibrillation (CMS/HCC)  -     CBC & Differential; Future  -     Comprehensive Metabolic Panel; Future  -     TSH; Future  -     Vitamin B12; Future  -     Hemoglobin A1c; Future  -     Urinalysis With Microscopic - Urine, Clean Catch; Future    3. Type 2 diabetes mellitus without complication, without long-term current use of insulin (CMS/HCC)  -     CBC & Differential; Future  -     Comprehensive Metabolic Panel; Future  -     TSH; Future  -     Vitamin B12; Future  -     Hemoglobin A1c; Future  -     Urinalysis With Microscopic - Urine, Clean Catch; Future    4. Allergic rhinitis, unspecified seasonality, unspecified trigger    Other orders  -     Accu-Chek FastClix Lancets misc; Inject 1 applicator under the skin into the appropriate area as directed 2 (two) times a day.  Dispense: 100 each; Refill: 0  -     glucose blood test strip; Use as instructed  Dispense: 100 each; Refill: 12  -     Blood Glucose Monitoring Suppl (Accu-Chek Janae Plus) w/Device kit; 1 kit 2 (two) times a day.  Dispense: 1 kit; Refill: 1  -     loratadine (Claritin) 10 MG tablet; Take 1 tablet by mouth Daily.  Dispense: 30 tablet; Refill: 1        Follow Up   Return if symptoms worsen or fail to improve.  Patient was given instructions  and counseling regarding his condition or for health maintenance advice. Please see specific information pulled into the AVS if appropriate.     New glucometer sent to pharmacy, advised to monitor BS at home, call with elevated or low readings.   He will call to make sooner f/u with cardiology as he is established.   He will return for labs, lab unavailable today.   Move positions slowly.   If any worsening symptoms, dizziness advised ER.   Trial claritin 10mg daily as needed for ear pain.   Patient agrees with plan of care and understands instructions. Call if worsening symptoms or any problems or concerns.

## 2021-03-02 DIAGNOSIS — Z23 IMMUNIZATION DUE: ICD-10-CM

## 2021-03-04 ENCOUNTER — LAB (OUTPATIENT)
Dept: FAMILY MEDICINE CLINIC | Facility: CLINIC | Age: 81
End: 2021-03-04

## 2021-03-04 DIAGNOSIS — I48.0 PAROXYSMAL ATRIAL FIBRILLATION (HCC): ICD-10-CM

## 2021-03-04 DIAGNOSIS — R42 DIZZINESS: ICD-10-CM

## 2021-03-04 DIAGNOSIS — E11.9 TYPE 2 DIABETES MELLITUS WITHOUT COMPLICATION, WITHOUT LONG-TERM CURRENT USE OF INSULIN (HCC): ICD-10-CM

## 2021-03-04 LAB
ALBUMIN SERPL-MCNC: 4.3 G/DL (ref 3.5–5.2)
ALBUMIN/GLOB SERPL: 1.8 G/DL
ALP SERPL-CCNC: 47 U/L (ref 39–117)
ALT SERPL W P-5'-P-CCNC: 19 U/L (ref 1–41)
ANION GAP SERPL CALCULATED.3IONS-SCNC: 8.9 MMOL/L (ref 5–15)
AST SERPL-CCNC: 14 U/L (ref 1–40)
BACTERIA UR QL AUTO: NORMAL /HPF
BILIRUB SERPL-MCNC: 0.4 MG/DL (ref 0–1.2)
BILIRUB UR QL STRIP: NEGATIVE
BUN SERPL-MCNC: 20 MG/DL (ref 8–23)
BUN/CREAT SERPL: 24.1 (ref 7–25)
CALCIUM SPEC-SCNC: 9 MG/DL (ref 8.6–10.5)
CHLORIDE SERPL-SCNC: 104 MMOL/L (ref 98–107)
CLARITY UR: CLEAR
CO2 SERPL-SCNC: 28.1 MMOL/L (ref 22–29)
COLOR UR: YELLOW
CREAT SERPL-MCNC: 0.83 MG/DL (ref 0.76–1.27)
ERYTHROCYTE [DISTWIDTH] IN BLOOD BY AUTOMATED COUNT: 13.6 % (ref 12.3–15.4)
GFR SERPL CREATININE-BSD FRML MDRD: 89 ML/MIN/1.73
GLOBULIN UR ELPH-MCNC: 2.4 GM/DL
GLUCOSE SERPL-MCNC: 95 MG/DL (ref 65–99)
GLUCOSE UR STRIP-MCNC: NEGATIVE MG/DL
HCT VFR BLD AUTO: 42.3 % (ref 37.5–51)
HGB BLD-MCNC: 14.2 G/DL (ref 13–17.7)
HGB UR QL STRIP.AUTO: NEGATIVE
KETONES UR QL STRIP: NEGATIVE
LEUKOCYTE ESTERASE UR QL STRIP.AUTO: NEGATIVE
LYMPHOCYTES # BLD AUTO: 3.6 10*3/MM3 (ref 0.7–3.1)
LYMPHOCYTES NFR BLD AUTO: 44.7 % (ref 19.6–45.3)
MCH RBC QN AUTO: 31.5 PG (ref 26.6–33)
MCHC RBC AUTO-ENTMCNC: 33.5 G/DL (ref 31.5–35.7)
MCV RBC AUTO: 94.1 FL (ref 79–97)
MONOCYTES # BLD AUTO: 0.4 10*3/MM3 (ref 0.1–0.9)
MONOCYTES NFR BLD AUTO: 4.9 % (ref 5–12)
NEUTROPHILS NFR BLD AUTO: 4 10*3/MM3 (ref 1.7–7)
NEUTROPHILS NFR BLD AUTO: 50.4 % (ref 42.7–76)
NITRITE UR QL STRIP: NEGATIVE
PH UR STRIP.AUTO: 5.5 [PH] (ref 4.6–8)
PLATELET # BLD AUTO: 143 10*3/MM3 (ref 140–450)
PMV BLD AUTO: 7.7 FL (ref 6–12)
POTASSIUM SERPL-SCNC: 4.7 MMOL/L (ref 3.5–5.2)
PROT SERPL-MCNC: 6.7 G/DL (ref 6–8.5)
PROT UR QL STRIP: NEGATIVE
RBC # BLD AUTO: 4.49 10*6/MM3 (ref 4.14–5.8)
RBC # UR: NORMAL /HPF
REF LAB TEST METHOD: NORMAL
SODIUM SERPL-SCNC: 141 MMOL/L (ref 136–145)
SP GR UR STRIP: 1.02 (ref 1–1.03)
SQUAMOUS #/AREA URNS HPF: NORMAL /HPF
TSH SERPL DL<=0.05 MIU/L-ACNC: 2.25 UIU/ML (ref 0.27–4.2)
UROBILINOGEN UR QL STRIP: NORMAL
VIT B12 BLD-MCNC: 482 PG/ML (ref 211–946)
WBC # BLD AUTO: 8 10*3/MM3 (ref 3.4–10.8)
WBC UR QL AUTO: NORMAL /HPF

## 2021-03-04 PROCEDURE — 36415 COLL VENOUS BLD VENIPUNCTURE: CPT | Performed by: NURSE PRACTITIONER

## 2021-03-04 PROCEDURE — 85025 COMPLETE CBC W/AUTO DIFF WBC: CPT | Performed by: NURSE PRACTITIONER

## 2021-03-04 PROCEDURE — 82607 VITAMIN B-12: CPT | Performed by: NURSE PRACTITIONER

## 2021-03-04 PROCEDURE — 80053 COMPREHEN METABOLIC PANEL: CPT | Performed by: NURSE PRACTITIONER

## 2021-03-04 PROCEDURE — 81001 URINALYSIS AUTO W/SCOPE: CPT | Performed by: NURSE PRACTITIONER

## 2021-03-04 PROCEDURE — 84443 ASSAY THYROID STIM HORMONE: CPT | Performed by: NURSE PRACTITIONER

## 2021-03-12 ENCOUNTER — TELEPHONE (OUTPATIENT)
Dept: ORTHOPEDIC SURGERY | Facility: CLINIC | Age: 81
End: 2021-03-12

## 2021-04-06 RX ORDER — METOPROLOL TARTRATE 50 MG/1
50 TABLET, FILM COATED ORAL DAILY
Qty: 90 TABLET | Refills: 0 | Status: SHIPPED | OUTPATIENT
Start: 2021-04-06 | End: 2021-09-01

## 2021-04-12 ENCOUNTER — TELEPHONE (OUTPATIENT)
Dept: FAMILY MEDICINE CLINIC | Facility: CLINIC | Age: 81
End: 2021-04-12

## 2021-04-12 NOTE — TELEPHONE ENCOUNTER
Caller: Doc Coyne    Relationship: Self    Best call back number: [phone]    What is the best time to reach you: ANYTIME    Who are you requesting to speak with (clinical staff, provider,  specific staff member): CLINICAL STAFF    Do you know the name of the person who called: DOC COYNE    What was the call regarding: PATIENT SAID HE RECEIVED HIS SECOND COVID-19 SHOT ON 03/31/21 AND HE HAS SINCE HAD DIZZY SPELLS ALMOST EVERY SINCE THAT SHOT, HE SAID HE TAKES SPELLS OF FEELING DIZZY AND LIKE HE MAY PASS OUT, HE SAID IT THEN GOES AWAY    PATIENT WOULD LIKE TO SEE WHAT HE SHOULD DO FOR THIS, OR IF SOMETHING CAN BE CALLED IN     PATIENT SAID HE DID NOT WISH TO GO TO THE EMERGENCY ROOM    Do you require a callback: YES

## 2021-04-13 PROCEDURE — 20610 DRAIN/INJ JOINT/BURSA W/O US: CPT | Performed by: ORTHOPAEDIC SURGERY

## 2021-04-13 RX ADMIN — METHYLPREDNISOLONE ACETATE 80 MG: 80 INJECTION, SUSPENSION INTRA-ARTICULAR; INTRALESIONAL; INTRAMUSCULAR; SOFT TISSUE at 16:03

## 2021-04-13 NOTE — PROGRESS NOTES
Patient: Doc Coyne  YOB: 1940  Date of Service: 4/13/2021    Chief Complaints: bilateral knee pain     Subjective:    History of Present Illness: Pt is seen in the office today with complaints of bilateral knee pain he has known degenerative changes gets intermittent injections not interested in surgical may be moving back to [place] in the near future      Allergies:   Allergies   Allergen Reactions   • Isosorbide Nitrate    • Novocain  [Procaine]    • Penicillins    • Propoxyphene    • Cephalexin Rash   • Doxycycline Rash   • Sulfa Antibiotics Rash       Medications:   Home Medications:  Current Outpatient Medications on File Prior to Visit   Medication Sig   • Accu-Chek FastClix Lancets misc Inject 1 applicator under the skin into the appropriate area as directed 2 (two) times a day.   • Blood Glucose Monitoring Suppl (Accu-Chek Janae Plus) w/Device kit 1 kit 2 (two) times a day.   • dilTIAZem CD (CARDIZEM CD) 120 MG 24 hr capsule TAKE 1 CAPSULE BY MOUTH DAILY   • Fluzone High-Dose Quadrivalent 0.7 ML suspension prefilled syringe TO BE ADMINISTERED BY PHARMACIST FOR IMMUNIZATION   • glucose blood test strip Use as instructed   • Lancets Misc. (ACCU-CHEK FASTCLIX LANCET) kit 1 kit 4 (Four) Times a Day.   • lisinopril (PRINIVIL,ZESTRIL) 30 MG tablet Take 1 tablet by mouth Daily.   • loratadine (Claritin) 10 MG tablet Take 1 tablet by mouth Daily.   • metFORMIN (GLUCOPHAGE) 500 MG tablet Take 1 tablet by mouth 2 (Two) Times a Day With Meals.   • metoprolol tartrate (LOPRESSOR) 50 MG tablet Take 1 tablet by mouth Daily.   • nitroglycerin (NITROSTAT) 0.4 MG SL tablet Place 1 tablet under the tongue Every 5 (Five) Minutes As Needed for Chest Pain. Take no more than 3 doses in 15 minutes.   • pantoprazole (PROTONIX) 40 MG EC tablet Take 1 tablet by mouth Daily.   • rivaroxaban (Xarelto) 20 MG tablet Take 1 tablet by mouth Daily.   • rosuvastatin (CRESTOR) 10 MG tablet TAKE 1 TABLET BY MOUTH  EVERY NIGHT   • tamsulosin (FLOMAX) 0.4 MG capsule 24 hr capsule Take 1 capsule by mouth Daily.     No current facility-administered medications on file prior to visit.     Current Medications:  Scheduled Meds:  Continuous Infusions:No current facility-administered medications for this visit.    PRN Meds:.    I have reviewed the patient's medical history in detail and updated the computerized patient record.  Review and summarization of old records include:    Past Medical History:   Diagnosis Date   • Atherosclerotic heart disease of native coronary artery without angina pectoris    • Atrial fibrillation (CMS/HCC)    • Benign prostatic hypertrophy    • Chest pain    • COPD (chronic obstructive pulmonary disease) (CMS/HCC)    • Diabetes mellitus (CMS/HCC)    • Difficulty walking    • Hypertension    • Peripheral neuropathy    • Prostate cancer (CMS/[facility])         Past Surgical History:   Procedure Laterality Date   • COLONOSCOPY     • HEMORRHOIDECTOMY     • PROSTATE SURGERY          Social History     Occupational History   • Not on file   Tobacco Use   • Smoking status: Former Smoker     Packs/day: 0.50     Types: Cigarettes     Quit date: 2020     Years since quittin.6   • Smokeless tobacco: Never Used   • Tobacco comment: QUIT 2 MONTHS AGO   Substance and Sexual Activity   • Alcohol use: Yes     Comment: OCCASIONALLY/ wine   • Drug use: No   • Sexual activity: Defer      Social History     Social History Narrative   • Not on file        Family History   Problem Relation Age of Onset   • Hypertension Father    • Heart failure Father    • Hyperlipidemia Father    • Heart disease Father    • Heart attack Father    • Heart attack Sister    • Stroke Mother    • Heart disease Mother    • Diabetes Mother    • Kidney disease Maternal Grandmother        ROS: 14 point review of systems was performed and was negative except for documented findings in HPI and today's encounter.     Allergies:   Allergies   Allergen  Reactions   • Isosorbide Nitrate    • Novocain  [Procaine]    • Penicillins    • Propoxyphene    • Cephalexin Rash   • Doxycycline Rash   • Sulfa Antibiotics Rash     Constitutional:  Denies fever, shaking or chills   Eyes:  Denies change in visual acuity   HENT:  Denies nasal congestion or sore throat   Respiratory:  Denies cough or shortness of breath   Cardiovascular:  Denies chest pain or severe LE edema   GI:  Denies abdominal pain, nausea, vomiting, bloody stools or diarrhea   Musculoskeletal:  Numbness, tingling, or loss of motor function only as noted above in history of present illness.  : Denies painful urination or hematuria  Integument:  Denies rash, lesion or ulceration   Neurologic:  Denies headache or focal weakness  Endocrine:  Denies lymphadenopathy  Psych:  Denies confusion or change in mental status   Hem:  Denies active bleeding      Physical Exam: 80 y.o. male  Wt Readings from Last 3 Encounters:   03/01/21 88.5 kg (195 lb)   01/04/21 86.6 kg (191 lb)   12/11/20 88 kg (194 lb)       There is no height or weight on file to calculate BMI.  No height and weight on file for this encounter.  There were no vitals filed for this visit.  Vital signs reviewed.   General Appearance:    Alert, cooperative, in no acute distress                    Ortho exam  Exam unchanged           .time    Assessment: Bilateral knee DJD plan is to proceed with injection    Large Joint Arthrocentesis: R knee  Date/Time: 4/13/2021 4:03 PM  Consent given by: patient  Site marked: site marked  Timeout: Immediately prior to procedure a time out was called to verify the correct patient, procedure, equipment, support staff and site/side marked as required   Supporting Documentation  Indications: pain and joint swelling   Procedure Details  Location: knee - R knee  Preparation: Patient was prepped and draped in the usual sterile fashion  Needle size: 22 G  Approach: anterolateral  Medications administered: 80 mg  methylPREDNISolone acetate 80 MG/ML; 4 mL lidocaine (cardiac)  Patient tolerance: patient tolerated the procedure well with no immediate complications    Large Joint Arthrocentesis: L knee  Date/Time: 4/13/2021 4:03 PM  Consent given by: patient  Site marked: site marked  Timeout: Immediately prior to procedure a time out was called to verify the correct patient, procedure, equipment, support staff and site/side marked as required   Supporting Documentation  Indications: pain and joint swelling   Procedure Details  Location: knee - L knee  Preparation: Patient was prepped and draped in the usual sterile fashion  Needle size: 22 G  Approach: anterolateral  Medications administered: 80 mg methylPREDNISolone acetate 80 MG/ML; 4 mL lidocaine (cardiac)  Patient tolerance: patient tolerated the procedure well with no immediate complications          Plan: Injections he is not interested in surgical  Follow up as indicated.  Ice, elevate, and rest as needed.  Discussed conservative measures of pain control including ice, bracing.  Also talked about the importance of strengthening Leticia Aguirre M.D.

## 2021-04-15 ENCOUNTER — CLINICAL SUPPORT (OUTPATIENT)
Dept: ORTHOPEDIC SURGERY | Facility: CLINIC | Age: 81
End: 2021-04-15

## 2021-04-15 VITALS — TEMPERATURE: 97.1 F | HEIGHT: 72 IN | WEIGHT: 193 LBS | BODY MASS INDEX: 26.14 KG/M2

## 2021-04-15 DIAGNOSIS — M17.10 PRIMARY LOCALIZED OSTEOARTHROSIS OF LOWER LEG, UNSPECIFIED LATERALITY: Primary | ICD-10-CM

## 2021-04-15 RX ORDER — METHYLPREDNISOLONE ACETATE 80 MG/ML
80 INJECTION, SUSPENSION INTRA-ARTICULAR; INTRALESIONAL; INTRAMUSCULAR; SOFT TISSUE
Status: COMPLETED | OUTPATIENT
Start: 2021-04-13 | End: 2021-04-13

## 2021-05-13 ENCOUNTER — APPOINTMENT (OUTPATIENT)
Dept: GENERAL RADIOLOGY | Facility: HOSPITAL | Age: 81
End: 2021-05-13

## 2021-05-13 ENCOUNTER — TELEPHONE (OUTPATIENT)
Dept: FAMILY MEDICINE CLINIC | Facility: CLINIC | Age: 81
End: 2021-05-13

## 2021-05-13 ENCOUNTER — HOSPITAL ENCOUNTER (EMERGENCY)
Facility: HOSPITAL | Age: 81
Discharge: HOME | End: 2021-05-13
Attending: EMERGENCY MEDICINE | Admitting: EMERGENCY MEDICINE

## 2021-05-13 ENCOUNTER — OFFICE VISIT (OUTPATIENT)
Dept: FAMILY MEDICINE CLINIC | Facility: CLINIC | Age: 81
End: 2021-05-13

## 2021-05-13 VITALS
WEIGHT: 196 LBS | TEMPERATURE: 96.9 F | BODY MASS INDEX: 26.55 KG/M2 | SYSTOLIC BLOOD PRESSURE: 130 MMHG | RESPIRATION RATE: 18 BRPM | OXYGEN SATURATION: 98 % | HEART RATE: 70 BPM | DIASTOLIC BLOOD PRESSURE: 76 MMHG | HEIGHT: 72 IN

## 2021-05-13 VITALS
HEART RATE: 61 BPM | BODY MASS INDEX: 26.55 KG/M2 | HEIGHT: 72 IN | TEMPERATURE: 97.5 F | WEIGHT: 196 LBS | SYSTOLIC BLOOD PRESSURE: 122 MMHG | OXYGEN SATURATION: 99 % | DIASTOLIC BLOOD PRESSURE: 70 MMHG

## 2021-05-13 DIAGNOSIS — I48.0 PAROXYSMAL ATRIAL FIBRILLATION (HCC): ICD-10-CM

## 2021-05-13 DIAGNOSIS — I10 ESSENTIAL HYPERTENSION: ICD-10-CM

## 2021-05-13 DIAGNOSIS — I48.91 ATRIAL FIBRILLATION, UNSPECIFIED TYPE (HCC): Primary | ICD-10-CM

## 2021-05-13 DIAGNOSIS — R07.9 CHEST PAIN, UNSPECIFIED TYPE: Primary | ICD-10-CM

## 2021-05-13 DIAGNOSIS — J44.9 CHRONIC OBSTRUCTIVE PULMONARY DISEASE, UNSPECIFIED COPD TYPE (HCC): ICD-10-CM

## 2021-05-13 DIAGNOSIS — R07.89 ATYPICAL CHEST PAIN: ICD-10-CM

## 2021-05-13 DIAGNOSIS — E11.9 TYPE 2 DIABETES MELLITUS WITHOUT COMPLICATION, WITHOUT LONG-TERM CURRENT USE OF INSULIN (HCC): ICD-10-CM

## 2021-05-13 LAB
ALBUMIN SERPL-MCNC: 4.3 G/DL (ref 3.5–5.2)
ALBUMIN/GLOB SERPL: 1.6 G/DL
ALP SERPL-CCNC: 42 U/L (ref 39–117)
ALT SERPL W P-5'-P-CCNC: 23 U/L (ref 1–41)
ANION GAP SERPL CALCULATED.3IONS-SCNC: 7.3 MMOL/L (ref 5–15)
AST SERPL-CCNC: 14 U/L (ref 1–40)
BASOPHILS # BLD AUTO: 0.05 10*3/MM3 (ref 0–0.2)
BASOPHILS NFR BLD AUTO: 0.6 % (ref 0–1.5)
BILIRUB SERPL-MCNC: 0.6 MG/DL (ref 0–1.2)
BUN SERPL-MCNC: 23 MG/DL (ref 8–23)
BUN/CREAT SERPL: 30.7 (ref 7–25)
CALCIUM SPEC-SCNC: 8.9 MG/DL (ref 8.6–10.5)
CHLORIDE SERPL-SCNC: 104 MMOL/L (ref 98–107)
CO2 SERPL-SCNC: 28.7 MMOL/L (ref 22–29)
CREAT SERPL-MCNC: 0.75 MG/DL (ref 0.76–1.27)
DEPRECATED RDW RBC AUTO: 44.5 FL (ref 37–54)
EOSINOPHIL # BLD AUTO: 0.08 10*3/MM3 (ref 0–0.4)
EOSINOPHIL NFR BLD AUTO: 0.9 % (ref 0.3–6.2)
ERYTHROCYTE [DISTWIDTH] IN BLOOD BY AUTOMATED COUNT: 12.6 % (ref 12.3–15.4)
GFR SERPL CREATININE-BSD FRML MDRD: 100 ML/MIN/1.73
GLOBULIN UR ELPH-MCNC: 2.7 GM/DL
GLUCOSE SERPL-MCNC: 103 MG/DL (ref 65–99)
HCT VFR BLD AUTO: 42.6 % (ref 37.5–51)
HGB BLD-MCNC: 14.3 G/DL (ref 13–17.7)
LYMPHOCYTES # BLD AUTO: 3.32 10*3/MM3 (ref 0.7–3.1)
LYMPHOCYTES NFR BLD AUTO: 38.4 % (ref 19.6–45.3)
MAGNESIUM SERPL-MCNC: 1.7 MG/DL (ref 1.6–2.4)
MCH RBC QN AUTO: 32.2 PG (ref 26.6–33)
MCHC RBC AUTO-ENTMCNC: 33.6 G/DL (ref 31.5–35.7)
MCV RBC AUTO: 95.9 FL (ref 79–97)
MONOCYTES # BLD AUTO: 0.51 10*3/MM3 (ref 0.1–0.9)
MONOCYTES NFR BLD AUTO: 5.9 % (ref 5–12)
NEUTROPHILS NFR BLD AUTO: 4.54 10*3/MM3 (ref 1.7–7)
NEUTROPHILS NFR BLD AUTO: 52.6 % (ref 42.7–76)
PLATELET # BLD AUTO: 143 10*3/MM3 (ref 140–450)
PMV BLD AUTO: 10.2 FL (ref 6–12)
POTASSIUM SERPL-SCNC: 4.4 MMOL/L (ref 3.5–5.2)
PROT SERPL-MCNC: 7 G/DL (ref 6–8.5)
QT INTERVAL: 394 MS
RBC # BLD AUTO: 4.44 10*6/MM3 (ref 4.14–5.8)
SODIUM SERPL-SCNC: 140 MMOL/L (ref 136–145)
TROPONIN T SERPL-MCNC: <0.01 NG/ML (ref 0–0.03)
WBC # BLD AUTO: 8.64 10*3/MM3 (ref 3.4–10.8)

## 2021-05-13 PROCEDURE — 71045 X-RAY EXAM CHEST 1 VIEW: CPT

## 2021-05-13 PROCEDURE — 99284 EMERGENCY DEPT VISIT MOD MDM: CPT

## 2021-05-13 PROCEDURE — 93010 ELECTROCARDIOGRAM REPORT: CPT | Performed by: INTERNAL MEDICINE

## 2021-05-13 PROCEDURE — 84484 ASSAY OF TROPONIN QUANT: CPT | Performed by: EMERGENCY MEDICINE

## 2021-05-13 PROCEDURE — 93005 ELECTROCARDIOGRAM TRACING: CPT | Performed by: EMERGENCY MEDICINE

## 2021-05-13 PROCEDURE — 83735 ASSAY OF MAGNESIUM: CPT | Performed by: EMERGENCY MEDICINE

## 2021-05-13 PROCEDURE — 99213 OFFICE O/P EST LOW 20 MIN: CPT | Performed by: NURSE PRACTITIONER

## 2021-05-13 PROCEDURE — 80053 COMPREHEN METABOLIC PANEL: CPT | Performed by: EMERGENCY MEDICINE

## 2021-05-13 PROCEDURE — 93000 ELECTROCARDIOGRAM COMPLETE: CPT | Performed by: NURSE PRACTITIONER

## 2021-05-13 PROCEDURE — 85025 COMPLETE CBC W/AUTO DIFF WBC: CPT | Performed by: EMERGENCY MEDICINE

## 2021-05-13 RX ORDER — SODIUM CHLORIDE 0.9 % (FLUSH) 0.9 %
10 SYRINGE (ML) INJECTION AS NEEDED
Status: DISCONTINUED | OUTPATIENT
Start: 2021-05-13 | End: 2021-05-13 | Stop reason: HOSPADM

## 2021-05-13 NOTE — PATIENT INSTRUCTIONS
Patient sent to [facility] ER at this time for eval. Advised to go straight to ER at this time, report called.   ekg today,   Advised to make f/u with cardiology as he is established,   If any worsening symptoms, CP advised ER.   Patient agrees with plan of care and understands instructions. Call if worsening symptoms or any problems or concerns.

## 2021-05-13 NOTE — TELEPHONE ENCOUNTER
Patient wanting a freestyle libre2 reader device and a freestyle libre2 sensor which you change every 14 days. It checks BS with out having to prick finger all the time. I called the pharmacy they said insurance probably wont cover this.

## 2021-05-13 NOTE — PROGRESS NOTES
"Chief Complaint  Chest Tightness (off/on started after havng second covid ) and Chest Pain (had episode monday night lasted about 10min took nitro pill helped the pain)    Subjective          Doc Coyne presents to [facility]  History of Present Illness  C/o chest tightness, states symptoms started after having covid 19 vaccine, he c/o chest pain, states last chest pain 2 days ago, lasting about 10 min, he took nitroglycerin tablet which helped. Sees cardiology Dr. Cade, last visit 11/2020, he missed last visit in April. With afib, HTN, taking diltiazem 120mg daily, lisinopril 30mg daily, metoprolol 50mg daily, also taking xarelto daily. States chest pain started about 3 weeks ago, feels like something sitting on chest, he states worsening 2 days ago, did have a couple of episodes of dizziness but has now resolved. He does smoke, he denies chest pain today but does feel pressure today, denies SOA, denies HA, he has had blurred vision. Sees opthalmology for cataracts.       Objective   Vital Signs:   /70 (BP Location: Left arm, Patient Position: Sitting, Cuff Size: Adult)   Pulse 61   Temp 97.5 °F (36.4 °C) (Infrared)   Ht 182.9 cm (72.01\")   Wt 88.9 kg (196 lb)   SpO2 99%   BMI 26.58 kg/m²     Physical Exam  Vitals and nursing note reviewed.   Constitutional:       General: He is not in acute distress.     Appearance: He is well-developed.   HENT:      Head: Normocephalic.   Eyes:      Pupils: Pupils are equal, round, and reactive to light.   Cardiovascular:      Rate and Rhythm: Normal rate and regular rhythm.      Pulses: Normal pulses.      Heart sounds: Normal heart sounds.   Pulmonary:      Effort: Pulmonary effort is normal.      Breath sounds: Normal breath sounds.   Musculoskeletal:      Right lower leg: No edema.      Left lower leg: No edema.   Skin:     General: Skin is warm and dry.   Neurological:      Mental Status: He is alert and oriented to " person, place, and time.   Psychiatric:         Behavior: Behavior normal.         Judgment: Judgment normal.        Result Review :              ECG 12 Lead    Date/Time: 5/13/2021 12:14 PM  Performed by: Shannan Aguirre APRN  Authorized by: Shannan Aguirre APRN   Comparison: compared with previous ECG from 11/12/2020  Comparison to previous ECG: Atrial fibrillation  Rhythm: atrial fibrillation  Rate: bradycardic  QRS axis: indeterminate    Clinical impression: abnormal EKG  Comments: Patient sent to ER.                Assessment and Plan    Diagnoses and all orders for this visit:    1. Chest pain, unspecified type (Primary)  -     ECG 12 Lead    2. Paroxysmal atrial fibrillation (CMS/HCC)  -     ECG 12 Lead    3. Essential hypertension  -     ECG 12 Lead        Follow Up   Return if symptoms worsen or fail to improve, for Medicare Wellness.  Patient was given instructions and counseling regarding his condition or for health maintenance advice. Please see specific information pulled into the AVS if appropriate.   Patient sent to [facility] ER at this time for eval. Advised to go straight to ER at this time, report called.   ekg today,   Advised to make f/u with cardiology as he is established,   If any worsening symptoms, CP advised ER.   Patient agrees with plan of care and understands instructions. Call if worsening symptoms or any problems or concerns.

## 2021-05-13 NOTE — TELEPHONE ENCOUNTER
Caller: Doc Coyne    Relationship: Self    Best call back number: [phone]    What medication are you requestin FOR 1 PATCH THAT GOES ON ARM WITH MACHINE TO CHECK BLOOD GLUCOSE GOOD FOR 14 DAYS    Have you had these symptoms before:    [x] Yes  [] No    Have you been treated for these symptoms before:   [x] Yes  [] No    If a prescription is needed, what is your preferred pharmacy and phone number:  [id number] [address] AT [facility] [address] [facility] [phone] [facility] [phone] FX    Additional notes:PATIENT WASN'T SURE WHAT IT WAS CALLED BUT STATED HIS INSURANCE WOULD COVER IT WITH A PRESCRIPTION.  PLEASE CALL AND ADVISE

## 2021-05-14 NOTE — TELEPHONE ENCOUNTER
This is usually for people who check BS 4x times on insulin. Will likely not be covered, I usually do not order these.

## 2021-05-14 NOTE — TELEPHONE ENCOUNTER
Left voicemail Shannan DAMON usually doesn't order these and insurance probably wont cover it anyway's.

## 2021-05-25 ENCOUNTER — TELEPHONE (OUTPATIENT)
Dept: CARDIOLOGY | Facility: CLINIC | Age: 81
End: 2021-05-25

## 2021-05-25 NOTE — TELEPHONE ENCOUNTER
Received a fax from [facility], for patient to have cataract surgery and needing a clearance.     Will discuss with Dr. ALBARADO     Per Dr. ALBARADO patient is cleared for surgery   Sent Letter and informed patient

## 2021-06-04 RX ORDER — LISINOPRIL 30 MG/1
30 TABLET ORAL DAILY
Qty: 30 TABLET | Refills: 2 | Status: SHIPPED | OUTPATIENT
Start: 2021-06-04 | End: 2021-09-01

## 2021-06-15 RX ORDER — NITROGLYCERIN 0.4 MG/1
TABLET SUBLINGUAL
Qty: 25 TABLET | OUTPATIENT
Start: 2021-06-15

## 2021-06-16 RX ORDER — NITROGLYCERIN 0.4 MG/1
0.4 TABLET SUBLINGUAL
Qty: 25 TABLET | Refills: 3 | Status: SHIPPED | OUTPATIENT
Start: 2021-06-16 | End: 2021-08-29

## 2021-06-21 ENCOUNTER — HOSPITAL ENCOUNTER (OUTPATIENT)
Facility: HOSPITAL | Age: 81
Setting detail: HOSPITAL OUTPATIENT SURGERY
End: 2021-06-21
Attending: INTERNAL MEDICINE | Admitting: INTERNAL MEDICINE

## 2021-06-21 ENCOUNTER — OFFICE VISIT (OUTPATIENT)
Dept: CARDIOLOGY | Facility: CLINIC | Age: 81
End: 2021-06-21

## 2021-06-21 VITALS
SYSTOLIC BLOOD PRESSURE: 148 MMHG | HEART RATE: 65 BPM | DIASTOLIC BLOOD PRESSURE: 73 MMHG | BODY MASS INDEX: 26.82 KG/M2 | WEIGHT: 198 LBS | HEIGHT: 72 IN

## 2021-06-21 DIAGNOSIS — R07.89 CHEST PAIN, ATYPICAL: Primary | ICD-10-CM

## 2021-06-21 DIAGNOSIS — I48.0 PAROXYSMAL ATRIAL FIBRILLATION (HCC): ICD-10-CM

## 2021-06-21 DIAGNOSIS — I25.10 ATHEROSCLEROSIS OF NATIVE CORONARY ARTERY OF NATIVE HEART WITHOUT ANGINA PECTORIS: ICD-10-CM

## 2021-06-21 DIAGNOSIS — R07.89 CHEST PAIN, ATYPICAL: ICD-10-CM

## 2021-06-21 DIAGNOSIS — Z79.01 ANTICOAGULATED: ICD-10-CM

## 2021-06-21 DIAGNOSIS — I10 ESSENTIAL HYPERTENSION: ICD-10-CM

## 2021-06-21 PROCEDURE — 99214 OFFICE O/P EST MOD 30 MIN: CPT | Performed by: INTERNAL MEDICINE

## 2021-06-21 PROCEDURE — 93000 ELECTROCARDIOGRAM COMPLETE: CPT | Performed by: INTERNAL MEDICINE

## 2021-06-21 NOTE — PROGRESS NOTES
ER FOLLOW UP, NOREEN   Subjective:        Doc Coyne is a 81 y.o. male who here for follow up    CC  ER VISIT FOR CP  HPI  81 years old male with known history of coronary artery disease, benign essential arterial hypertension and atrial fibrillation here for the follow-up patient recently went to the emergency room because of the chest pain work-up was negative     Problems Addressed this Visit        Cardiac and Vasculature    Atrial fibrillation (CMS/HCC)    Atherosclerotic heart disease of native coronary artery without angina pectoris - Primary    Hypertension       Coag and Thromboembolic    Anticoagulated      Diagnoses       Codes Comments    Atherosclerosis of native coronary artery of native heart without angina pectoris    -  Primary ICD-10-CM: I25.10  ICD-9-CM: 414.01     Essential hypertension     ICD-10-CM: I10  ICD-9-CM: 401.9     Paroxysmal atrial fibrillation (CMS/HCC)     ICD-10-CM: I48.0  ICD-9-CM: 427.31     Anticoagulated     ICD-10-CM: Z79.01  ICD-9-CM: V58.61         .    The following portions of the patient's history were reviewed and updated as appropriate: allergies, current medications, past family history, past medical history, past social history, past surgical history and problem list.    Past Medical History:   Diagnosis Date   • Atherosclerotic heart disease of native coronary artery without angina pectoris    • Atrial fibrillation (CMS/HCC)    • Benign prostatic hypertrophy    • Chest pain    • Colon polyp    • COPD (chronic obstructive pulmonary disease) (CMS/HCC)    • Diabetes mellitus (CMS/HCC)    • Difficulty walking    • Hypertension    • Peripheral neuropathy    • Prostate cancer (CMS/HCC)      reports that he quit smoking about 10 months ago. His smoking use included cigarettes. He smoked 0.50 packs per day. He has never used smokeless tobacco. He reports current alcohol use. He reports that he does not use drugs.   Family History   Problem Relation Age of Onset   •  "Hypertension Father    • Heart failure Father    • Hyperlipidemia Father    • Heart disease Father    • Heart attack Father    • Heart attack Sister    • Stroke Mother    • Heart disease Mother    • Diabetes Mother    • Kidney disease Maternal Grandmother        Review of Systems  Constitutional: No wt loss, fever, fatigue  Gastrointestinal: No nausea, abdominal pain  Behavioral/Psych: No insomnia or anxiety   Cardiovascular chest pain  Objective:       Physical Exam  /73   Pulse 65   Ht 182.9 cm (72\")   Wt 89.8 kg (198 lb)   BMI 26.85 kg/m²   General appearance: No acute changes   Neck: Trachea midline; NECK, supple, no thyromegaly or lymphadenopathy   Lungs: Normal size and shape, normal breath sounds, equal distribution of air, no rales and rhonchi   CV: S1-S2 regular, no murmurs, no rub, no gallop   Abdomen: Soft, non-tender; no masses , no abnormal abdominal sounds   Extremities: No deformity , normal color , no peripheral edema   Skin: Normal temperature, turgor and texture; no rash, ulcers            ECG 12 Lead    Date/Time: 6/21/2021 10:19 AM  Performed by: Joel Rosado MD  Authorized by: Joel Rosado MD   Comparison: compared with previous ECG   Similar to previous ECG  Rhythm: atrial fibrillation  ST Flattening: all    Clinical impression: abnormal EKG              Echocardiogram:        Current Outpatient Medications:   •  Accu-Chek FastClix Lancets misc, Inject 1 applicator under the skin into the appropriate area as directed 2 (two) times a day., Disp: 100 each, Rfl: 0  •  Blood Glucose Monitoring Suppl (Accu-Chek Janae Plus) w/Device kit, 1 kit 2 (two) times a day., Disp: 1 kit, Rfl: 1  •  dilTIAZem CD (CARDIZEM CD) 120 MG 24 hr capsule, TAKE 1 CAPSULE BY MOUTH DAILY, Disp: 90 capsule, Rfl: 1  •  Fluzone High-Dose Quadrivalent 0.7 ML suspension prefilled syringe, TO BE ADMINISTERED BY PHARMACIST FOR IMMUNIZATION, Disp: , Rfl:   •  glucose blood test strip, Use as " instructed, Disp: 100 each, Rfl: 12  •  Lancets Misc. (ACCU-CHEK FASTCLIX LANCET) kit, 1 kit 4 (Four) Times a Day., Disp: 1 kit, Rfl: 1  •  lisinopril (PRINIVIL,ZESTRIL) 30 MG tablet, TAKE 1 TABLET BY MOUTH DAILY, Disp: 30 tablet, Rfl: 2  •  loratadine (Claritin) 10 MG tablet, Take 1 tablet by mouth Daily., Disp: 30 tablet, Rfl: 1  •  metFORMIN (GLUCOPHAGE) 500 MG tablet, Take 1 tablet by mouth 2 (Two) Times a Day With Meals., Disp: 60 tablet, Rfl: 2  •  metoprolol tartrate (LOPRESSOR) 50 MG tablet, Take 1 tablet by mouth Daily., Disp: 90 tablet, Rfl: 0  •  pantoprazole (PROTONIX) 40 MG EC tablet, Take 1 tablet by mouth Daily., Disp: 30 tablet, Rfl: 0  •  rivaroxaban (Xarelto) 20 MG tablet, Take 1 tablet by mouth Daily., Disp: 30 tablet, Rfl: 3  •  rosuvastatin (CRESTOR) 10 MG tablet, TAKE 1 TABLET BY MOUTH EVERY NIGHT, Disp: 90 tablet, Rfl: 1  •  tamsulosin (FLOMAX) 0.4 MG capsule 24 hr capsule, Take 1 capsule by mouth Daily., Disp: , Rfl:   •  nitroglycerin (NITROSTAT) 0.4 MG SL tablet, Place 1 tablet under the tongue Every 5 (Five) Minutes As Needed for Chest Pain. Take no more than 3 doses in 15 minutes., Disp: 25 tablet, Rfl: 3   Assessment:        Patient Active Problem List   Diagnosis   • Atrial fibrillation (CMS/HCC)   • Chest pain, atypical   • Shortness of breath   • Preop cardiovascular exam   • Anticoagulated   • Atherosclerotic heart disease of native coronary artery without angina pectoris   • Hypertension   • Benign prostatic hyperplasia   • COPD (chronic obstructive pulmonary disease) (CMS/HCC)   • Type 2 diabetes mellitus, without long-term current use of insulin (CMS/HCC)   • Bradycardia   • Chest pain   • TIA (transient ischemic attack)   • Prostate CA (CMS/HCC)   • Visual changes               Plan:            ICD-10-CM ICD-9-CM   1. Atherosclerosis of native coronary artery of native heart without angina pectoris  I25.10 414.01   2. Essential hypertension  I10 401.9   3. Paroxysmal atrial  fibrillation (CMS/[facility])  I48.0 427.31   4. Anticoagulated  Z79.01 V58.61     1. Atherosclerosis of native coronary artery of native heart without angina pectoris  No angina pectoris    2. Essential hypertension  Blood pressure better    3. Paroxysmal atrial fibrillation (CMS/[facility])  Controlled    4. Anticoagulated  Continue current treatment  WITH RECURRENT CP, WILL PROCEED WITH CATH    STOP BLOOD THINNER 2 DAYS  Procedure, risks and options of cardiac cath explained to pt INCLUDING BUT NOT LIMITED TO MI, STROKE, DEATH, INFECTION HAEMORRHAGE, . Pt understands well and agrees with no further questions.    COUNSELING:    Doc Islas was given to patient for the following topics: diagnostic results, risk factor reductions, impressions, risks and benefits of treatment options and importance of treatment compliance .       SMOKING COUNSELING:    [unfilled]    Dictated using Dragon dictation

## 2021-06-23 ENCOUNTER — HOSPITAL ENCOUNTER (OUTPATIENT)
Dept: CARDIOLOGY | Facility: HOSPITAL | Age: 81
Discharge: HOME | End: 2021-06-23
Admitting: INTERNAL MEDICINE

## 2021-06-23 DIAGNOSIS — R07.89 CHEST PAIN, ATYPICAL: ICD-10-CM

## 2021-06-23 DIAGNOSIS — I10 ESSENTIAL HYPERTENSION: ICD-10-CM

## 2021-06-23 DIAGNOSIS — I25.10 ATHEROSCLEROSIS OF NATIVE CORONARY ARTERY OF NATIVE HEART WITHOUT ANGINA PECTORIS: ICD-10-CM

## 2021-06-23 DIAGNOSIS — I48.0 PAROXYSMAL ATRIAL FIBRILLATION (HCC): ICD-10-CM

## 2021-06-23 DIAGNOSIS — Z79.01 ANTICOAGULATED: ICD-10-CM

## 2021-06-23 LAB
ANION GAP SERPL CALCULATED.3IONS-SCNC: 8 MMOL/L (ref 5–15)
APTT PPP: 35.3 SECONDS (ref 22.7–35.4)
BASOPHILS # BLD AUTO: 0.04 10*3/MM3 (ref 0–0.2)
BASOPHILS NFR BLD AUTO: 0.5 % (ref 0–1.5)
BUN SERPL-MCNC: 19 MG/DL (ref 8–23)
BUN/CREAT SERPL: 29.2 (ref 7–25)
CALCIUM SPEC-SCNC: 9.2 MG/DL (ref 8.6–10.5)
CHLORIDE SERPL-SCNC: 103 MMOL/L (ref 98–107)
CO2 SERPL-SCNC: 29 MMOL/L (ref 22–29)
CREAT SERPL-MCNC: 0.65 MG/DL (ref 0.76–1.27)
DEPRECATED RDW RBC AUTO: 45 FL (ref 37–54)
EOSINOPHIL # BLD AUTO: 0.1 10*3/MM3 (ref 0–0.4)
EOSINOPHIL NFR BLD AUTO: 1.3 % (ref 0.3–6.2)
ERYTHROCYTE [DISTWIDTH] IN BLOOD BY AUTOMATED COUNT: 12.9 % (ref 12.3–15.4)
GFR SERPL CREATININE-BSD FRML MDRD: 118 ML/MIN/1.73
GLUCOSE SERPL-MCNC: 110 MG/DL (ref 65–99)
HCT VFR BLD AUTO: 43.2 % (ref 37.5–51)
HGB BLD-MCNC: 14.7 G/DL (ref 13–17.7)
IMM GRANULOCYTES # BLD AUTO: 0.08 10*3/MM3 (ref 0–0.05)
IMM GRANULOCYTES NFR BLD AUTO: 1.1 % (ref 0–0.5)
INR PPP: 1.12 (ref 0.9–1.1)
LYMPHOCYTES # BLD AUTO: 3.29 10*3/MM3 (ref 0.7–3.1)
LYMPHOCYTES NFR BLD AUTO: 43.4 % (ref 19.6–45.3)
MCH RBC QN AUTO: 32.4 PG (ref 26.6–33)
MCHC RBC AUTO-ENTMCNC: 34 G/DL (ref 31.5–35.7)
MCV RBC AUTO: 95.2 FL (ref 79–97)
MONOCYTES # BLD AUTO: 0.57 10*3/MM3 (ref 0.1–0.9)
MONOCYTES NFR BLD AUTO: 7.5 % (ref 5–12)
NEUTROPHILS NFR BLD AUTO: 3.5 10*3/MM3 (ref 1.7–7)
NEUTROPHILS NFR BLD AUTO: 46.2 % (ref 42.7–76)
NRBC BLD AUTO-RTO: 0 /100 WBC (ref 0–0.2)
PLATELET # BLD AUTO: 131 10*3/MM3 (ref 140–450)
PMV BLD AUTO: 11.1 FL (ref 6–12)
POTASSIUM SERPL-SCNC: 4.9 MMOL/L (ref 3.5–5.2)
PROTHROMBIN TIME: 14.2 SECONDS (ref 11.7–14.2)
RBC # BLD AUTO: 4.54 10*6/MM3 (ref 4.14–5.8)
SODIUM SERPL-SCNC: 140 MMOL/L (ref 136–145)
WBC # BLD AUTO: 7.58 10*3/MM3 (ref 3.4–10.8)

## 2021-06-23 PROCEDURE — 80048 BASIC METABOLIC PNL TOTAL CA: CPT | Performed by: INTERNAL MEDICINE

## 2021-06-23 PROCEDURE — 36415 COLL VENOUS BLD VENIPUNCTURE: CPT | Performed by: INTERNAL MEDICINE

## 2021-06-23 PROCEDURE — 85025 COMPLETE CBC W/AUTO DIFF WBC: CPT | Performed by: INTERNAL MEDICINE

## 2021-06-23 PROCEDURE — 85730 THROMBOPLASTIN TIME PARTIAL: CPT | Performed by: INTERNAL MEDICINE

## 2021-06-23 PROCEDURE — 85610 PROTHROMBIN TIME: CPT | Performed by: INTERNAL MEDICINE

## 2021-06-30 ENCOUNTER — TELEPHONE (OUTPATIENT)
Dept: CARDIOLOGY | Facility: CLINIC | Age: 81
End: 2021-06-30

## 2021-06-30 NOTE — TELEPHONE ENCOUNTER
PATIENT WAS SCHEDULED TO HAVE CATH TODAY BUT HIS RIDE DID NOT SHOW UP -PATIENT WILL FIND SOMEONE ELSE AND CALL TO RESCHEDULE HIS CATH

## 2021-07-23 ENCOUNTER — TELEPHONE (OUTPATIENT)
Dept: CARDIOLOGY | Facility: CLINIC | Age: 81
End: 2021-07-23

## 2021-07-23 NOTE — TELEPHONE ENCOUNTER
Left vm to inform patient that heart cath has to be cancelled due to insurance  -I have attempted to reach mr potter multible times since last  Week with no response from him . I will also send him a letter to his home today

## 2021-08-18 ENCOUNTER — HOSPITAL ENCOUNTER (OUTPATIENT)
Dept: CARDIOLOGY | Facility: HOSPITAL | Age: 81
Discharge: HOME | End: 2021-08-18
Admitting: INTERNAL MEDICINE

## 2021-08-18 DIAGNOSIS — I10 ESSENTIAL HYPERTENSION: ICD-10-CM

## 2021-08-18 DIAGNOSIS — Z79.01 ANTICOAGULATED: ICD-10-CM

## 2021-08-18 DIAGNOSIS — I48.0 PAROXYSMAL ATRIAL FIBRILLATION (HCC): ICD-10-CM

## 2021-08-18 DIAGNOSIS — I25.10 ATHEROSCLEROSIS OF NATIVE CORONARY ARTERY OF NATIVE HEART WITHOUT ANGINA PECTORIS: ICD-10-CM

## 2021-08-18 DIAGNOSIS — R07.89 CHEST PAIN, ATYPICAL: ICD-10-CM

## 2021-08-18 LAB
ANION GAP SERPL CALCULATED.3IONS-SCNC: 10.9 MMOL/L (ref 5–15)
APTT PPP: 32.4 SECONDS (ref 22.7–35.4)
BASOPHILS # BLD AUTO: 0.04 10*3/MM3 (ref 0–0.2)
BASOPHILS NFR BLD AUTO: 0.5 % (ref 0–1.5)
BUN SERPL-MCNC: 20 MG/DL (ref 8–23)
BUN/CREAT SERPL: 24.1 (ref 7–25)
CALCIUM SPEC-SCNC: 9.2 MG/DL (ref 8.6–10.5)
CHLORIDE SERPL-SCNC: 103 MMOL/L (ref 98–107)
CO2 SERPL-SCNC: 27.1 MMOL/L (ref 22–29)
CREAT SERPL-MCNC: 0.83 MG/DL (ref 0.76–1.27)
DEPRECATED RDW RBC AUTO: 42.2 FL (ref 37–54)
EOSINOPHIL # BLD AUTO: 0.07 10*3/MM3 (ref 0–0.4)
EOSINOPHIL NFR BLD AUTO: 0.9 % (ref 0.3–6.2)
ERYTHROCYTE [DISTWIDTH] IN BLOOD BY AUTOMATED COUNT: 12.2 % (ref 12.3–15.4)
GFR SERPL CREATININE-BSD FRML MDRD: 89 ML/MIN/1.73
GLUCOSE SERPL-MCNC: 87 MG/DL (ref 65–99)
HCT VFR BLD AUTO: 45.1 % (ref 37.5–51)
HGB BLD-MCNC: 15 G/DL (ref 13–17.7)
IMM GRANULOCYTES # BLD AUTO: 0.06 10*3/MM3 (ref 0–0.05)
IMM GRANULOCYTES NFR BLD AUTO: 0.8 % (ref 0–0.5)
INR PPP: 1.11 (ref 0.9–1.1)
LYMPHOCYTES # BLD AUTO: 3.87 10*3/MM3 (ref 0.7–3.1)
LYMPHOCYTES NFR BLD AUTO: 49.6 % (ref 19.6–45.3)
MCH RBC QN AUTO: 31.3 PG (ref 26.6–33)
MCHC RBC AUTO-ENTMCNC: 33.3 G/DL (ref 31.5–35.7)
MCV RBC AUTO: 94.2 FL (ref 79–97)
MONOCYTES # BLD AUTO: 0.41 10*3/MM3 (ref 0.1–0.9)
MONOCYTES NFR BLD AUTO: 5.2 % (ref 5–12)
NEUTROPHILS NFR BLD AUTO: 3.36 10*3/MM3 (ref 1.7–7)
NEUTROPHILS NFR BLD AUTO: 43 % (ref 42.7–76)
NRBC BLD AUTO-RTO: 0 /100 WBC (ref 0–0.2)
PLATELET # BLD AUTO: 146 10*3/MM3 (ref 140–450)
PMV BLD AUTO: 10.5 FL (ref 6–12)
POTASSIUM SERPL-SCNC: 4.7 MMOL/L (ref 3.5–5.2)
PROTHROMBIN TIME: 14.1 SECONDS (ref 11.7–14.2)
RBC # BLD AUTO: 4.79 10*6/MM3 (ref 4.14–5.8)
SODIUM SERPL-SCNC: 141 MMOL/L (ref 136–145)
WBC # BLD AUTO: 7.81 10*3/MM3 (ref 3.4–10.8)

## 2021-08-18 PROCEDURE — 85025 COMPLETE CBC W/AUTO DIFF WBC: CPT | Performed by: INTERNAL MEDICINE

## 2021-08-18 PROCEDURE — 36415 COLL VENOUS BLD VENIPUNCTURE: CPT | Performed by: INTERNAL MEDICINE

## 2021-08-18 PROCEDURE — 80048 BASIC METABOLIC PNL TOTAL CA: CPT | Performed by: INTERNAL MEDICINE

## 2021-08-18 PROCEDURE — 85610 PROTHROMBIN TIME: CPT | Performed by: INTERNAL MEDICINE

## 2021-08-18 PROCEDURE — 85730 THROMBOPLASTIN TIME PARTIAL: CPT | Performed by: INTERNAL MEDICINE

## 2021-08-25 ENCOUNTER — HOSPITAL ENCOUNTER (OUTPATIENT)
Facility: HOSPITAL | Age: 81
Setting detail: HOSPITAL OUTPATIENT SURGERY
Discharge: HOME | End: 2021-08-25
Attending: INTERNAL MEDICINE | Admitting: INTERNAL MEDICINE

## 2021-08-25 VITALS
RESPIRATION RATE: 18 BRPM | SYSTOLIC BLOOD PRESSURE: 118 MMHG | BODY MASS INDEX: 26.14 KG/M2 | OXYGEN SATURATION: 98 % | HEART RATE: 51 BPM | WEIGHT: 193 LBS | DIASTOLIC BLOOD PRESSURE: 72 MMHG | TEMPERATURE: 97.7 F | HEIGHT: 72 IN

## 2021-08-25 DIAGNOSIS — I10 ESSENTIAL HYPERTENSION: ICD-10-CM

## 2021-08-25 DIAGNOSIS — R07.89 CHEST PAIN, ATYPICAL: ICD-10-CM

## 2021-08-25 DIAGNOSIS — I25.10 ATHEROSCLEROSIS OF NATIVE CORONARY ARTERY OF NATIVE HEART WITHOUT ANGINA PECTORIS: ICD-10-CM

## 2021-08-25 DIAGNOSIS — R06.02 SHORTNESS OF BREATH: Primary | ICD-10-CM

## 2021-08-25 DIAGNOSIS — I48.0 PAROXYSMAL ATRIAL FIBRILLATION (HCC): ICD-10-CM

## 2021-08-25 DIAGNOSIS — Z79.01 ANTICOAGULATED: ICD-10-CM

## 2021-08-25 LAB — GLUCOSE BLDC GLUCOMTR-MCNC: 90 MG/DL (ref 70–130)

## 2021-08-25 PROCEDURE — 82962 GLUCOSE BLOOD TEST: CPT

## 2021-08-25 PROCEDURE — 93458 L HRT ARTERY/VENTRICLE ANGIO: CPT | Performed by: INTERNAL MEDICINE

## 2021-08-25 PROCEDURE — 25010000002 MIDAZOLAM PER 1 MG: Performed by: INTERNAL MEDICINE

## 2021-08-25 PROCEDURE — 25010000002 HEPARIN (PORCINE) PER 1000 UNITS: Performed by: INTERNAL MEDICINE

## 2021-08-25 PROCEDURE — 25010000002 FENTANYL CITRATE (PF) 50 MCG/ML SOLUTION: Performed by: INTERNAL MEDICINE

## 2021-08-25 PROCEDURE — 0 IOPAMIDOL PER 1 ML: Performed by: INTERNAL MEDICINE

## 2021-08-25 PROCEDURE — C1769 GUIDE WIRE: HCPCS | Performed by: INTERNAL MEDICINE

## 2021-08-25 PROCEDURE — C1894 INTRO/SHEATH, NON-LASER: HCPCS | Performed by: INTERNAL MEDICINE

## 2021-08-25 RX ORDER — ACETAMINOPHEN 325 MG/1
650 TABLET ORAL EVERY 4 HOURS PRN
Status: DISCONTINUED | OUTPATIENT
Start: 2021-08-25 | End: 2021-08-25 | Stop reason: HOSPADM

## 2021-08-25 RX ORDER — MIDAZOLAM HYDROCHLORIDE 1 MG/ML
INJECTION INTRAMUSCULAR; INTRAVENOUS AS NEEDED
Status: DISCONTINUED | OUTPATIENT
Start: 2021-08-25 | End: 2021-08-25 | Stop reason: HOSPADM

## 2021-08-25 RX ORDER — LIDOCAINE HYDROCHLORIDE 20 MG/ML
INJECTION, SOLUTION INFILTRATION; PERINEURAL AS NEEDED
Status: DISCONTINUED | OUTPATIENT
Start: 2021-08-25 | End: 2021-08-25 | Stop reason: HOSPADM

## 2021-08-25 RX ORDER — SODIUM CHLORIDE 9 MG/ML
75 INJECTION, SOLUTION INTRAVENOUS CONTINUOUS
Status: DISCONTINUED | OUTPATIENT
Start: 2021-08-25 | End: 2021-08-25 | Stop reason: HOSPADM

## 2021-08-25 RX ORDER — SODIUM CHLORIDE 0.9 % (FLUSH) 0.9 %
10 SYRINGE (ML) INJECTION AS NEEDED
Status: DISCONTINUED | OUTPATIENT
Start: 2021-08-25 | End: 2021-08-25 | Stop reason: HOSPADM

## 2021-08-25 RX ORDER — SODIUM CHLORIDE 0.9 % (FLUSH) 0.9 %
3 SYRINGE (ML) INJECTION EVERY 12 HOURS SCHEDULED
Status: DISCONTINUED | OUTPATIENT
Start: 2021-08-25 | End: 2021-08-25 | Stop reason: HOSPADM

## 2021-08-25 RX ORDER — FENTANYL CITRATE 50 UG/ML
INJECTION, SOLUTION INTRAMUSCULAR; INTRAVENOUS AS NEEDED
Status: DISCONTINUED | OUTPATIENT
Start: 2021-08-25 | End: 2021-08-25 | Stop reason: HOSPADM

## 2021-08-25 RX ADMIN — SODIUM CHLORIDE 75 ML/HR: 9 INJECTION, SOLUTION INTRAVENOUS at 09:34

## 2021-08-25 NOTE — PERIOPERATIVE NURSING NOTE
Dr Rosado at bedside talking with pt.  MD is aware that pt does not have anyone available at home to check in on him.  Pt states that he has a 'life alert' necklace at home and a neighbor and girlfriend that he can call once he gets home to check on him but he doesn't know neighbors name or number.  Girlfriend (Ani) is not answering to phone number that pt provided; voicemail left.  Pt was reminding by MD when to call 911.  MD states pt needs to stay 4 hours post procedure to monitor before dc home.

## 2021-08-25 NOTE — H&P
ER FOLLOW UP, CP   Subjective:        Doc Coyne is a 81 y.o. male who here for follow up    CC  ER VISIT FOR CP  HPI  81 years old male with known history of coronary artery disease, benign essential arterial hypertension and atrial fibrillation here for the follow-up patient recently went to the emergency room because of the chest pain work-up was negative     Problems Addressed this Visit        Cardiac and Vasculature    Atherosclerosis of native coronary artery of native heart without angina pectoris    Relevant Orders    Cardiac Catheterization/Vascular Study    Essential hypertension    Relevant Orders    Cardiac Catheterization/Vascular Study    Paroxysmal atrial fibrillation (CMS/HCC)    Relevant Orders    Cardiac Catheterization/Vascular Study       Coag and Thromboembolic    Anticoagulated    Relevant Orders    Cardiac Catheterization/Vascular Study       Symptoms and Signs    Chest pain, atypical    Relevant Orders    Cardiac Catheterization/Vascular Study      Diagnoses       Codes Comments    Atherosclerosis of native coronary artery of native heart without angina pectoris     ICD-10-CM: I25.10  ICD-9-CM: 414.01     Essential hypertension     ICD-10-CM: I10  ICD-9-CM: 401.9     Paroxysmal atrial fibrillation (CMS/HCC)     ICD-10-CM: I48.0  ICD-9-CM: 427.31     Anticoagulated     ICD-10-CM: Z79.01  ICD-9-CM: V58.61     Chest pain, atypical     ICD-10-CM: R07.89  ICD-9-CM: 786.59         .    The following portions of the patient's history were reviewed and updated as appropriate: allergies, current medications, past family history, past medical history, past social history, past surgical history and problem list.    Past Medical History:   Diagnosis Date   • Atherosclerotic heart disease of native coronary artery without angina pectoris    • Atrial fibrillation (CMS/HCC)    • Benign prostatic hypertrophy    • Chest pain    • Colon polyp    • COPD (chronic obstructive pulmonary disease) (CMS/HCC)  "   • Diabetes mellitus (CMS/HCC)    • Difficulty walking    • Hyperlipidemia    • Hypertension    • Peripheral neuropathy    • Prostate cancer (CMS/HCC)    • Stroke (CMS/[facility])      reports that he quit smoking about 12 months ago. His smoking use included cigarettes. He smoked 0.50 packs per day. He has never used smokeless tobacco. He reports current alcohol use. He reports that he does not use drugs.   Family History   Problem Relation Age of Onset   • Hypertension Father    • Heart failure Father    • Hyperlipidemia Father    • Heart disease Father    • Heart attack Father    • Heart attack Sister    • Stroke Mother    • Heart disease Mother    • Diabetes Mother    • Kidney disease Maternal Grandmother        Review of Systems  Constitutional: No wt loss, fever, fatigue  Gastrointestinal: No nausea, abdominal pain  Behavioral/Psych: No insomnia or anxiety   Cardiovascular chest pain  Objective:       Physical Exam  /97 (BP Location: Left arm, Patient Position: Lying)   Pulse 88   Temp 97.7 °F (36.5 °C) (Temporal)   Resp 20   Ht 182.9 cm (72\")   Wt 87.5 kg (193 lb)   SpO2 100%   BMI 26.18 kg/m²   General appearance: No acute changes   Neck: Trachea midline; NECK, supple, no thyromegaly or lymphadenopathy   Lungs: Normal size and shape, normal breath sounds, equal distribution of air, no rales and rhonchi   CV: S1-S2 regular, no murmurs, no rub, no gallop   Abdomen: Soft, non-tender; no masses , no abnormal abdominal sounds   Extremities: No deformity , normal color , no peripheral edema   Skin: Normal temperature, turgor and texture; no rash, ulcers          Procedures      Echocardiogram:        Current Facility-Administered Medications:   •  sodium chloride 0.9 % flush 10 mL, 10 mL, Intravenous, PRN, Joel Rosado MD  •  sodium chloride 0.9 % flush 3 mL, 3 mL, Intravenous, Q12H, Joel Rosado MD  •  sodium chloride 0.9 % infusion, 75 mL/hr, Intravenous, Continuous, Alonzo, " MD Joel, Last Rate: 75 mL/hr at 08/25/21 0934, 75 mL/hr at 08/25/21 0934   Assessment:        Patient Active Problem List   Diagnosis   • Atrial fibrillation (CMS/HCC)   • Chest pain, atypical   • Shortness of breath   • Preop cardiovascular exam   • Anticoagulated   • Atherosclerotic heart disease of native coronary artery without angina pectoris   • Hypertension   • Benign prostatic hyperplasia   • COPD (chronic obstructive pulmonary disease) (CMS/HCC)   • Type 2 diabetes mellitus, without long-term current use of insulin (CMS/HCC)   • Bradycardia   • Chest pain   • TIA (transient ischemic attack)   • Prostate CA (CMS/HCC)   • Visual changes   • Atherosclerosis of native coronary artery of native heart without angina pectoris   • Essential hypertension   • Paroxysmal atrial fibrillation (CMS/HCC)               Plan:            ICD-10-CM ICD-9-CM   1. Atherosclerosis of native coronary artery of native heart without angina pectoris  I25.10 414.01   2. Essential hypertension  I10 401.9   3. Paroxysmal atrial fibrillation (CMS/HCC)  I48.0 427.31   4. Anticoagulated  Z79.01 V58.61   5. Chest pain, atypical  R07.89 786.59     1. Atherosclerosis of native coronary artery of native heart without angina pectoris  No angina pectoris    2. Essential hypertension  Blood pressure better    3. Paroxysmal atrial fibrillation (CMS/HCC)  Controlled    4. Anticoagulated  Continue current treatment  WITH RECURRENT CP, WILL PROCEED WITH CATH    STOP BLOOD THINNER 2 DAYS  Procedure, risks and options of cardiac cath explained to pt INCLUDING BUT NOT LIMITED TO MI, STROKE, DEATH, INFECTION HAEMORRHAGE, . Pt understands well and agrees with no further questions.    COUNSELING:    Doc Islas was given to patient for the following topics: diagnostic results, risk factor reductions, impressions, risks and benefits of treatment options and importance of treatment compliance .       SMOKING  COUNSELING:    [unfilled]    Dictated using Dragon dictation

## 2021-08-25 NOTE — DISCHARGE INSTRUCTIONS
[facility]  [address] [place] [address]    Coronary Angiogram (Radial/Ulnar Approach) After Care    Refer to this sheet in the next few weeks. These instructions provide you with information on caring for yourself after your procedure. Your caregiver may also give you more specific instructions. Your treatment has been planned according to current medical practices, but problems sometimes occur. Call your caregiver if you have any problems or questions after your procedure.    Home Care Instructions:  · You may shower the day after the procedure. Remove the bandage (dressing) and gently wash the site with plain soap and water. Gently pat the site dry. You may apply a band aid daily for 2 days if desired.    · Do not apply powder or lotion to the site.  · Do not submerge the affected site in water for 3 to 5 days or until the site is completely healed.   · Do not lift, push or pull anything over 5 pounds for 5 days after your procedure or as directed by your physician.  As a reference, a gallon of milk weighs 8 pounds.   · Inspect the site at least twice daily. You may notice some bruising at the site and it may be tender for 1 to 2 weeks.     · Increase your fluid intake for the next 2 days.    · Keep arm elevated for 24 hours. For the remainder of the day, keep your arm in “Pledge of Allegiance” position when up and about.     · You may drive 24 hours after the procedure unless otherwise instructed by your caregiver.  · Do not operate machinery or power tools for 24 hours.  · A responsible adult should be with you for the first 24 hours after you arrive home. Do not make any important legal decisions or sign legal papers for 24 hours.  Do not drink alcohol for 24 hours.    · Metformin or any medications containing Metformin should not be taken for 48 hours after your procedure.      Call Your Doctor if:   · You have unusual pain at the radial/ulnar (wrist) site.  · You have redness, warmth,  swelling, or pain at the radial/ulnar (wrist) site.  · You have drainage (other than a small amount of blood on the dressing).  · `You have chills or a fever > 101.  · Your arm becomes pale or dark, cool, tingly, or numb.  · You develop chest pain, shortness of breath, feel faint or pass out.    · You have any symptoms of a stroke.  Remember BE FAST  · B-balance. Sudden trouble walking or loss of balance.  · E-eyes.  Sudden changes in how you see or a sudden onset of a very bad headache.   · F-face. Sudden weakness or loss of feeling of the face or facial droop on one side.   · A-arms Sudden weakness or numbness in one arm.  One arm drifts down if they are both held out in front of you.   · S-speech.  Sudden trouble speaking, slurred speech or trouble understanding what are saying.   · T-time  Time to call emergency services.  Write down the symptoms and the time they started.   of loss of feeling in an arm.  This happens suddenly and usually on one side of the body.  · You have heavy bleeding from the site, hold pressure on the site for 20 minutes.  If the bleeding stops, apply a fresh bandage and call your cardiologist.  However, if you continue to have bleeding, call 911.

## 2021-08-26 ENCOUNTER — OFFICE VISIT (OUTPATIENT)
Dept: ORTHOPEDIC SURGERY | Facility: CLINIC | Age: 81
End: 2021-08-26

## 2021-08-26 VITALS — WEIGHT: 193 LBS | TEMPERATURE: 97.2 F | BODY MASS INDEX: 26.14 KG/M2 | HEIGHT: 72 IN

## 2021-08-26 DIAGNOSIS — M17.11 PRIMARY OSTEOARTHRITIS OF RIGHT KNEE: ICD-10-CM

## 2021-08-26 DIAGNOSIS — M25.561 RIGHT KNEE PAIN, UNSPECIFIED CHRONICITY: Primary | ICD-10-CM

## 2021-08-26 PROCEDURE — 73562 X-RAY EXAM OF KNEE 3: CPT | Performed by: ORTHOPAEDIC SURGERY

## 2021-08-26 PROCEDURE — 99214 OFFICE O/P EST MOD 30 MIN: CPT | Performed by: ORTHOPAEDIC SURGERY

## 2021-08-26 PROCEDURE — 20610 DRAIN/INJ JOINT/BURSA W/O US: CPT | Performed by: ORTHOPAEDIC SURGERY

## 2021-08-26 RX ORDER — METHYLPREDNISOLONE ACETATE 80 MG/ML
80 INJECTION, SUSPENSION INTRA-ARTICULAR; INTRALESIONAL; INTRAMUSCULAR; SOFT TISSUE
Status: COMPLETED | OUTPATIENT
Start: 2021-08-26 | End: 2021-08-26

## 2021-08-26 RX ADMIN — METHYLPREDNISOLONE ACETATE 80 MG: 80 INJECTION, SUSPENSION INTRA-ARTICULAR; INTRALESIONAL; INTRAMUSCULAR; SOFT TISSUE at 11:59

## 2021-08-26 NOTE — PROGRESS NOTES
New Knee      Patient: Doc Coyne        YOB: 1940    Medical Record Number: [id number]        Chief Complaints:   Right knee pain    History of Present Illness: This is a 81-year-old patient of mine that I last saw in April for bilateral knee DJD.  He felt 2 nights ago he got tangled up with his dog and fell over 2 chairs landing on his right knee.  He does have some pain and swelling inability to terminally extend he is ambulating without a cane or walker he walked in the office today and asked if we would see him which we certainly said yes.  His symptoms are moderate intermittent aching swelling worse with activity better with rest his past medical history is significant remarkable especially for A. fib and diabetes remainder his past medical problems are listed below and reviewed        Allergies:   Allergies   Allergen Reactions   • Isosorbide Nitrate    • Novocain  [Procaine]    • Penicillins    • Propoxyphene    • Cephalexin Rash   • Doxycycline Rash   • Sulfa Antibiotics Rash       Medications:   Home Medications:  Current Outpatient Medications on File Prior to Visit   Medication Sig   • Accu-Chek FastClix Lancets misc Inject 1 applicator under the skin into the appropriate area as directed 2 (two) times a day.   • Blood Glucose Monitoring Suppl (Accu-Chek Janae Plus) w/Device kit 1 kit 2 (two) times a day.   • dilTIAZem CD (CARDIZEM CD) 120 MG 24 hr capsule TAKE 1 CAPSULE BY MOUTH DAILY   • Fluzone High-Dose Quadrivalent 0.7 ML suspension prefilled syringe TO BE ADMINISTERED BY PHARMACIST FOR IMMUNIZATION   • glucose blood test strip Use as instructed   • Lancets Misc. (ACCU-CHEK FASTCLIX LANCET) kit 1 kit 4 (Four) Times a Day.   • lisinopril (PRINIVIL,ZESTRIL) 30 MG tablet TAKE 1 TABLET BY MOUTH DAILY   • loratadine (Claritin) 10 MG tablet Take 1 tablet by mouth Daily.   • [START ON 8/27/2021] metFORMIN (GLUCOPHAGE) 500 MG tablet Take 1 tablet by mouth 2 (Two) Times a Day With  Meals.   • metoprolol tartrate (LOPRESSOR) 50 MG tablet Take 1 tablet by mouth Daily.   • nitroglycerin (NITROSTAT) 0.4 MG SL tablet Place 1 tablet under the tongue Every 5 (Five) Minutes As Needed for Chest Pain. Take no more than 3 doses in 15 minutes.   • pantoprazole (PROTONIX) 40 MG EC tablet Take 1 tablet by mouth Daily.   • rivaroxaban (Xarelto) 20 MG tablet Take 1 tablet by mouth Daily.   • rosuvastatin (CRESTOR) 10 MG tablet TAKE 1 TABLET BY MOUTH EVERY NIGHT   • tamsulosin (FLOMAX) 0.4 MG capsule 24 hr capsule Take 1 capsule by mouth Daily.     Current Facility-Administered Medications on File Prior to Visit   Medication   • [DISCONTINUED] acetaminophen (TYLENOL) tablet 650 mg   • [DISCONTINUED] sodium chloride 0.9 % infusion     Current Medications:  Scheduled Meds:  Continuous Infusions:No current facility-administered medications for this visit.    PRN Meds:.    Past Medical History:   Diagnosis Date   • Atherosclerotic heart disease of native coronary artery without angina pectoris    • Atrial fibrillation (CMS/HCC)    • Benign prostatic hypertrophy    • Chest pain    • Colon polyp    • COPD (chronic obstructive pulmonary disease) (CMS/HCC)    • Diabetes mellitus (CMS/HCC)    • Difficulty walking    • Hyperlipidemia    • Hypertension    • Peripheral neuropathy    • Prostate cancer (CMS/HCC)    • Stroke (CMS/HCC)         Past Surgical History:   Procedure Laterality Date   • CARDIAC CATHETERIZATION     • CARDIAC CATHETERIZATION N/A 8/25/2021    Procedure: Left Heart Cath;  Surgeon: Joel Rosado MD;  Location: [facility] CATH INVASIVE LOCATION;  Service: Cardiology;  Laterality: N/A;   • CARDIAC CATHETERIZATION N/A 8/25/2021    Procedure: Coronary angiography;  Surgeon: Joel Rosado MD;  Location: [facility] CATH INVASIVE LOCATION;  Service: Cardiology;  Laterality: N/A;   • CARDIAC CATHETERIZATION N/A 8/25/2021    Procedure: Left ventriculography;  Surgeon: Joel Rosado MD;   "Location: [facility] INVASIVE LOCATION;  Service: Cardiology;  Laterality: N/A;   • COLONOSCOPY     • HEMORRHOIDECTOMY     • PROSTATE SURGERY          Social History     Occupational History   • Not on file   Tobacco Use   • Smoking status: Former Smoker     Packs/day: 0.50     Types: Cigarettes     Quit date: 2020     Years since quittin.0   • Smokeless tobacco: Never Used   • Tobacco comment: QUIT 2 MONTHS AGO   Vaping Use   • Vaping Use: Never used   Substance and Sexual Activity   • Alcohol use: Yes     Comment: OCCASIONALLY/ wine   • Drug use: No   • Sexual activity: Defer      Social History     Social History Narrative   • Not on file        Family History   Problem Relation Age of Onset   • Hypertension Father    • Heart failure Father    • Hyperlipidemia Father    • Heart disease Father    • Heart attack Father    • Heart attack Sister    • Stroke Mother    • Heart disease Mother    • Diabetes Mother    • Kidney disease Maternal Grandmother              Review of Systems: 14 point review of systems are more for the right knee pain only the remainder negative per the patient    Review of Systems      Physical Exam: 81 y.o. male  General Appearance:    Alert, cooperative, in no acute distress                   Vitals:    21 1137   Temp: 97.2 °F (36.2 °C)   Weight: 87.5 kg (193 lb)   Height: 182.9 cm (72\")   PainSc:   2      Patient is alert and read ×3 no acute distress appears her above-listed at height weight and age.  Affect is normal respiratory rate is normal unlabored. Heart rate regular rate rhythm, sclera, dentition and hearing are normal for the purpose of this exam.        Ortho Exam physical exam the right  knee she has mild effusion no overlying skin changes no lymphedema no lymphadenopathy.  She is sitting in a genu valgum position.  She has -5° of extension flexion is to 125 she has palpable tenderness laterally more than medially and crepitus with range of motion.  Her calf is " soft and nontender      Large Joint Arthrocentesis: R knee  Date/Time: 8/26/2021 11:59 AM  Consent given by: patient  Site marked: site marked  Timeout: Immediately prior to procedure a time out was called to verify the correct patient, procedure, equipment, support staff and site/side marked as required   Supporting Documentation  Indications: pain   Procedure Details  Location: knee - R knee  Preparation: Patient was prepped and draped in the usual sterile fashion  Needle size: 22 G  Approach: anteromedial  Medications administered: 80 mg methylPREDNISolone acetate 80 MG/ML; 4 mL lidocaine (cardiac)  Patient tolerance: patient tolerated the procedure well with no immediate complications                   Radiology:   AP, Lateral and merchant views of the right knee  were ordered/reviewed to evauateknee pain.  I did compared to x-rays done in October last year he does have marked narrowing of his lateral compartment on the right, marked narrowing of his medial compartment on the left and moderate patellofemoral OA no acute pathology is noted these are essentially unchanged no fracture etc. he understands with his past medical history his risk of the injection are higher  Imaging Results (Most Recent)     Procedure Component Value Units Date/Time    XR Knee 3 View Right [[id number]] Resulted: 08/26/21 1128     Updated: 08/26/21 1128    Impression:      Ordering physician's impression is located in the Encounter Note dated 08/26/21. X-ray performed in the DR room.          Assessment/Plan:      Right knee pain following a fall hopefully just irritated his arthritis which resulted in pain and swelling plan is to proceed with an injection as a diagnostic and therapeutic tool he fails to improve might consider other means of testing just to rule out occult fracture

## 2021-08-29 ENCOUNTER — HOSPITAL ENCOUNTER (EMERGENCY)
Facility: HOSPITAL | Age: 81
Discharge: HOME | End: 2021-08-30
Attending: EMERGENCY MEDICINE | Admitting: EMERGENCY MEDICINE

## 2021-08-29 ENCOUNTER — APPOINTMENT (OUTPATIENT)
Dept: GENERAL RADIOLOGY | Facility: HOSPITAL | Age: 81
End: 2021-08-29

## 2021-08-29 VITALS
BODY MASS INDEX: 27.02 KG/M2 | SYSTOLIC BLOOD PRESSURE: 147 MMHG | OXYGEN SATURATION: 97 % | DIASTOLIC BLOOD PRESSURE: 82 MMHG | TEMPERATURE: 98.9 F | HEART RATE: 52 BPM | HEIGHT: 72 IN | WEIGHT: 199.52 LBS | RESPIRATION RATE: 16 BRPM

## 2021-08-29 DIAGNOSIS — R06.00 DYSPNEA, UNSPECIFIED TYPE: Primary | ICD-10-CM

## 2021-08-29 LAB
ALBUMIN SERPL-MCNC: 4.3 G/DL (ref 3.5–5.2)
ALBUMIN/GLOB SERPL: 2.2 G/DL
ALP SERPL-CCNC: 46 U/L (ref 39–117)
ALT SERPL W P-5'-P-CCNC: 17 U/L (ref 1–41)
ANION GAP SERPL CALCULATED.3IONS-SCNC: 9 MMOL/L (ref 5–15)
AST SERPL-CCNC: 14 U/L (ref 1–40)
BASOPHILS # BLD AUTO: 0.04 10*3/MM3 (ref 0–0.2)
BASOPHILS NFR BLD AUTO: 0.5 % (ref 0–1.5)
BILIRUB SERPL-MCNC: 0.5 MG/DL (ref 0–1.2)
BUN SERPL-MCNC: 20 MG/DL (ref 8–23)
BUN/CREAT SERPL: 29.9 (ref 7–25)
CALCIUM SPEC-SCNC: 9 MG/DL (ref 8.6–10.5)
CHLORIDE SERPL-SCNC: 106 MMOL/L (ref 98–107)
CO2 SERPL-SCNC: 27 MMOL/L (ref 22–29)
CREAT SERPL-MCNC: 0.67 MG/DL (ref 0.76–1.27)
DEPRECATED RDW RBC AUTO: 44.3 FL (ref 37–54)
EOSINOPHIL # BLD AUTO: 0.06 10*3/MM3 (ref 0–0.4)
EOSINOPHIL NFR BLD AUTO: 0.7 % (ref 0.3–6.2)
ERYTHROCYTE [DISTWIDTH] IN BLOOD BY AUTOMATED COUNT: 12.7 % (ref 12.3–15.4)
GFR SERPL CREATININE-BSD FRML MDRD: 114 ML/MIN/1.73
GLOBULIN UR ELPH-MCNC: 2 GM/DL
GLUCOSE SERPL-MCNC: 139 MG/DL (ref 65–99)
HCT VFR BLD AUTO: 41.2 % (ref 37.5–51)
HGB BLD-MCNC: 13.7 G/DL (ref 13–17.7)
IMM GRANULOCYTES # BLD AUTO: 0.09 10*3/MM3 (ref 0–0.05)
IMM GRANULOCYTES NFR BLD AUTO: 1.1 % (ref 0–0.5)
LYMPHOCYTES # BLD AUTO: 3.37 10*3/MM3 (ref 0.7–3.1)
LYMPHOCYTES NFR BLD AUTO: 41.8 % (ref 19.6–45.3)
MCH RBC QN AUTO: 31.7 PG (ref 26.6–33)
MCHC RBC AUTO-ENTMCNC: 33.3 G/DL (ref 31.5–35.7)
MCV RBC AUTO: 95.4 FL (ref 79–97)
MONOCYTES # BLD AUTO: 0.53 10*3/MM3 (ref 0.1–0.9)
MONOCYTES NFR BLD AUTO: 6.6 % (ref 5–12)
NEUTROPHILS NFR BLD AUTO: 3.97 10*3/MM3 (ref 1.7–7)
NEUTROPHILS NFR BLD AUTO: 49.3 % (ref 42.7–76)
NRBC BLD AUTO-RTO: 0 /100 WBC (ref 0–0.2)
NT-PROBNP SERPL-MCNC: 1543 PG/ML (ref 0–1800)
PLATELET # BLD AUTO: 152 10*3/MM3 (ref 140–450)
PMV BLD AUTO: 10.3 FL (ref 6–12)
POTASSIUM SERPL-SCNC: 4.2 MMOL/L (ref 3.5–5.2)
PROT SERPL-MCNC: 6.3 G/DL (ref 6–8.5)
QT INTERVAL: 360 MS
RBC # BLD AUTO: 4.32 10*6/MM3 (ref 4.14–5.8)
SODIUM SERPL-SCNC: 142 MMOL/L (ref 136–145)
TROPONIN T SERPL-MCNC: <0.01 NG/ML (ref 0–0.03)
TROPONIN T SERPL-MCNC: <0.01 NG/ML (ref 0–0.03)
WBC # BLD AUTO: 8.06 10*3/MM3 (ref 3.4–10.8)

## 2021-08-29 PROCEDURE — 93010 ELECTROCARDIOGRAM REPORT: CPT | Performed by: INTERNAL MEDICINE

## 2021-08-29 PROCEDURE — 83880 ASSAY OF NATRIURETIC PEPTIDE: CPT | Performed by: EMERGENCY MEDICINE

## 2021-08-29 PROCEDURE — 80053 COMPREHEN METABOLIC PANEL: CPT | Performed by: EMERGENCY MEDICINE

## 2021-08-29 PROCEDURE — 84484 ASSAY OF TROPONIN QUANT: CPT | Performed by: EMERGENCY MEDICINE

## 2021-08-29 PROCEDURE — 85025 COMPLETE CBC W/AUTO DIFF WBC: CPT | Performed by: EMERGENCY MEDICINE

## 2021-08-29 PROCEDURE — 93005 ELECTROCARDIOGRAM TRACING: CPT | Performed by: EMERGENCY MEDICINE

## 2021-08-29 PROCEDURE — 71045 X-RAY EXAM CHEST 1 VIEW: CPT

## 2021-08-29 PROCEDURE — 93005 ELECTROCARDIOGRAM TRACING: CPT

## 2021-08-29 PROCEDURE — 99284 EMERGENCY DEPT VISIT MOD MDM: CPT

## 2021-08-29 RX ORDER — LISINOPRIL 20 MG/1
20 TABLET ORAL ONCE
Status: COMPLETED | OUTPATIENT
Start: 2021-08-29 | End: 2021-08-29

## 2021-08-29 RX ORDER — PANTOPRAZOLE SODIUM 40 MG/1
40 TABLET, DELAYED RELEASE ORAL ONCE
Status: COMPLETED | OUTPATIENT
Start: 2021-08-29 | End: 2021-08-29

## 2021-08-29 RX ORDER — METOPROLOL SUCCINATE 50 MG/1
50 TABLET, EXTENDED RELEASE ORAL ONCE
Status: DISCONTINUED | OUTPATIENT
Start: 2021-08-29 | End: 2021-08-30 | Stop reason: HOSPADM

## 2021-08-29 RX ORDER — SODIUM CHLORIDE 0.9 % (FLUSH) 0.9 %
10 SYRINGE (ML) INJECTION AS NEEDED
Status: DISCONTINUED | OUTPATIENT
Start: 2021-08-29 | End: 2021-08-30 | Stop reason: HOSPADM

## 2021-08-29 RX ADMIN — RIVAROXABAN 20 MG: 20 TABLET, FILM COATED ORAL at 22:37

## 2021-08-29 RX ADMIN — PANTOPRAZOLE SODIUM 40 MG: 40 TABLET, DELAYED RELEASE ORAL at 22:37

## 2021-08-29 RX ADMIN — LISINOPRIL 20 MG: 20 TABLET ORAL at 23:46

## 2021-09-01 RX ORDER — METOPROLOL TARTRATE 50 MG/1
50 TABLET, FILM COATED ORAL DAILY
Qty: 90 TABLET | Refills: 0 | Status: SHIPPED | OUTPATIENT
Start: 2021-09-01 | End: 2021-11-10

## 2021-09-01 RX ORDER — LISINOPRIL 30 MG/1
30 TABLET ORAL DAILY
Qty: 30 TABLET | Refills: 2 | Status: SHIPPED | OUTPATIENT
Start: 2021-09-01 | End: 2022-01-12 | Stop reason: SDUPTHER

## 2021-09-01 NOTE — CASE MANAGEMENT/SOCIAL WORK
Per MD request, placed follow-up phone call to patient to inquire if he was able to get his medications filled today. There was no answer and patient's voicemail is not set up. Will attempt to call again this afternoon/evening. ROZ Alcantara RN

## 2021-09-01 NOTE — CASE MANAGEMENT/SOCIAL WORK
Second attempt to call patient for follow-up, per MD request. Again, no answer and no voicemail set up. ROZ Alcantara RN

## 2021-09-03 ENCOUNTER — TELEPHONE (OUTPATIENT)
Dept: SOCIAL WORK | Facility: HOSPITAL | Age: 81
End: 2021-09-03

## 2021-09-03 NOTE — TELEPHONE ENCOUNTER
Attempted to contact pt for ER f/u call. All numbers found in chart and on epic are incorrect. All numbers for emergency contacts are incorrect. Myla Cobos RN

## 2021-09-13 ENCOUNTER — APPOINTMENT (OUTPATIENT)
Dept: GENERAL RADIOLOGY | Facility: HOSPITAL | Age: 81
End: 2021-09-13

## 2021-09-13 ENCOUNTER — HOSPITAL ENCOUNTER (EMERGENCY)
Facility: HOSPITAL | Age: 81
Discharge: HOME | End: 2021-09-13
Attending: EMERGENCY MEDICINE | Admitting: EMERGENCY MEDICINE

## 2021-09-13 ENCOUNTER — APPOINTMENT (OUTPATIENT)
Dept: CT IMAGING | Facility: HOSPITAL | Age: 81
End: 2021-09-13

## 2021-09-13 VITALS
OXYGEN SATURATION: 98 % | RESPIRATION RATE: 16 BRPM | HEART RATE: 60 BPM | WEIGHT: 193 LBS | TEMPERATURE: 97.1 F | DIASTOLIC BLOOD PRESSURE: 71 MMHG | BODY MASS INDEX: 26.14 KG/M2 | HEIGHT: 72 IN | SYSTOLIC BLOOD PRESSURE: 122 MMHG

## 2021-09-13 DIAGNOSIS — I48.11 LONGSTANDING PERSISTENT ATRIAL FIBRILLATION (HCC): Primary | ICD-10-CM

## 2021-09-13 DIAGNOSIS — M54.2 NECK PAIN: ICD-10-CM

## 2021-09-13 DIAGNOSIS — R00.1 BRADYCARDIA: ICD-10-CM

## 2021-09-13 DIAGNOSIS — R51.9 ACUTE NONINTRACTABLE HEADACHE, UNSPECIFIED HEADACHE TYPE: ICD-10-CM

## 2021-09-13 LAB
ALBUMIN SERPL-MCNC: 4.4 G/DL (ref 3.5–5.2)
ALBUMIN/GLOB SERPL: 1.8 G/DL
ALP SERPL-CCNC: 55 U/L (ref 39–117)
ALT SERPL W P-5'-P-CCNC: 35 U/L (ref 1–41)
ANION GAP SERPL CALCULATED.3IONS-SCNC: 11.4 MMOL/L (ref 5–15)
AST SERPL-CCNC: 28 U/L (ref 1–40)
BASOPHILS # BLD AUTO: 0.05 10*3/MM3 (ref 0–0.2)
BASOPHILS NFR BLD AUTO: 0.5 % (ref 0–1.5)
BILIRUB SERPL-MCNC: 0.6 MG/DL (ref 0–1.2)
BUN SERPL-MCNC: 25 MG/DL (ref 8–23)
BUN/CREAT SERPL: 21.2 (ref 7–25)
CALCIUM SPEC-SCNC: 9 MG/DL (ref 8.6–10.5)
CHLORIDE SERPL-SCNC: 103 MMOL/L (ref 98–107)
CO2 SERPL-SCNC: 22.6 MMOL/L (ref 22–29)
CREAT SERPL-MCNC: 1.18 MG/DL (ref 0.76–1.27)
D-LACTATE SERPL-SCNC: 1.1 MMOL/L (ref 0.5–2)
DEPRECATED RDW RBC AUTO: 46 FL (ref 37–54)
EOSINOPHIL # BLD AUTO: 0.07 10*3/MM3 (ref 0–0.4)
EOSINOPHIL NFR BLD AUTO: 0.7 % (ref 0.3–6.2)
ERYTHROCYTE [DISTWIDTH] IN BLOOD BY AUTOMATED COUNT: 12.7 % (ref 12.3–15.4)
GFR SERPL CREATININE-BSD FRML MDRD: 59 ML/MIN/1.73
GLOBULIN UR ELPH-MCNC: 2.5 GM/DL
GLUCOSE SERPL-MCNC: 114 MG/DL (ref 65–99)
HCT VFR BLD AUTO: 46.1 % (ref 37.5–51)
HGB BLD-MCNC: 14.5 G/DL (ref 13–17.7)
HOLD SPECIMEN: NORMAL
HOLD SPECIMEN: NORMAL
IMM GRANULOCYTES # BLD AUTO: 0.08 10*3/MM3 (ref 0–0.05)
IMM GRANULOCYTES NFR BLD AUTO: 0.8 % (ref 0–0.5)
LYMPHOCYTES # BLD AUTO: 3.97 10*3/MM3 (ref 0.7–3.1)
LYMPHOCYTES NFR BLD AUTO: 37.9 % (ref 19.6–45.3)
MCH RBC QN AUTO: 31 PG (ref 26.6–33)
MCHC RBC AUTO-ENTMCNC: 31.5 G/DL (ref 31.5–35.7)
MCV RBC AUTO: 98.5 FL (ref 79–97)
MONOCYTES # BLD AUTO: 0.6 10*3/MM3 (ref 0.1–0.9)
MONOCYTES NFR BLD AUTO: 5.7 % (ref 5–12)
NEUTROPHILS NFR BLD AUTO: 5.71 10*3/MM3 (ref 1.7–7)
NEUTROPHILS NFR BLD AUTO: 54.4 % (ref 42.7–76)
NRBC BLD AUTO-RTO: 0 /100 WBC (ref 0–0.2)
NT-PROBNP SERPL-MCNC: 1104 PG/ML (ref 0–1800)
PLATELET # BLD AUTO: 162 10*3/MM3 (ref 140–450)
PMV BLD AUTO: 10.5 FL (ref 6–12)
POTASSIUM SERPL-SCNC: 5 MMOL/L (ref 3.5–5.2)
PROT SERPL-MCNC: 6.9 G/DL (ref 6–8.5)
QT INTERVAL: 464 MS
RBC # BLD AUTO: 4.68 10*6/MM3 (ref 4.14–5.8)
SARS-COV-2 RNA PNL SPEC NAA+PROBE: NOT DETECTED
SODIUM SERPL-SCNC: 137 MMOL/L (ref 136–145)
T4 FREE SERPL-MCNC: 1.54 NG/DL (ref 0.93–1.7)
TROPONIN T SERPL-MCNC: <0.01 NG/ML (ref 0–0.03)
TSH SERPL DL<=0.05 MIU/L-ACNC: 1.2 UIU/ML (ref 0.27–4.2)
WBC # BLD AUTO: 10.48 10*3/MM3 (ref 3.4–10.8)
WHOLE BLOOD HOLD SPECIMEN: NORMAL
WHOLE BLOOD HOLD SPECIMEN: NORMAL

## 2021-09-13 PROCEDURE — 80053 COMPREHEN METABOLIC PANEL: CPT | Performed by: PHYSICIAN ASSISTANT

## 2021-09-13 PROCEDURE — 71045 X-RAY EXAM CHEST 1 VIEW: CPT

## 2021-09-13 PROCEDURE — 93005 ELECTROCARDIOGRAM TRACING: CPT | Performed by: EMERGENCY MEDICINE

## 2021-09-13 PROCEDURE — 99283 EMERGENCY DEPT VISIT LOW MDM: CPT

## 2021-09-13 PROCEDURE — 84443 ASSAY THYROID STIM HORMONE: CPT | Performed by: PHYSICIAN ASSISTANT

## 2021-09-13 PROCEDURE — 72125 CT NECK SPINE W/O DYE: CPT

## 2021-09-13 PROCEDURE — 93005 ELECTROCARDIOGRAM TRACING: CPT

## 2021-09-13 PROCEDURE — 83605 ASSAY OF LACTIC ACID: CPT | Performed by: PHYSICIAN ASSISTANT

## 2021-09-13 PROCEDURE — 70450 CT HEAD/BRAIN W/O DYE: CPT

## 2021-09-13 PROCEDURE — 87635 SARS-COV-2 COVID-19 AMP PRB: CPT | Performed by: PHYSICIAN ASSISTANT

## 2021-09-13 PROCEDURE — 83880 ASSAY OF NATRIURETIC PEPTIDE: CPT | Performed by: PHYSICIAN ASSISTANT

## 2021-09-13 PROCEDURE — 84439 ASSAY OF FREE THYROXINE: CPT | Performed by: PHYSICIAN ASSISTANT

## 2021-09-13 PROCEDURE — 70486 CT MAXILLOFACIAL W/O DYE: CPT

## 2021-09-13 PROCEDURE — 93010 ELECTROCARDIOGRAM REPORT: CPT | Performed by: INTERNAL MEDICINE

## 2021-09-13 PROCEDURE — 85025 COMPLETE CBC W/AUTO DIFF WBC: CPT | Performed by: PHYSICIAN ASSISTANT

## 2021-09-13 PROCEDURE — 84484 ASSAY OF TROPONIN QUANT: CPT | Performed by: PHYSICIAN ASSISTANT

## 2021-09-13 RX ORDER — SODIUM CHLORIDE 0.9 % (FLUSH) 0.9 %
10 SYRINGE (ML) INJECTION AS NEEDED
Status: DISCONTINUED | OUTPATIENT
Start: 2021-09-13 | End: 2021-09-13 | Stop reason: HOSPADM

## 2021-09-13 RX ADMIN — SODIUM CHLORIDE 1000 ML: 9 INJECTION, SOLUTION INTRAVENOUS at 14:38

## 2021-09-13 NOTE — ED PROVIDER NOTES
EMERGENCY DEPARTMENT ENCOUNTER    Room Number:  04/04  Date of encounter:  9/14/2021  PCP: Luiz Nicolas MD  Historian: Patient      HPI:  Chief Complaint: Fall with neck pain, hypotension and low heart rate  A complete HPI/ROS/PMH/PSH/SH/FH are unobtainable due to: She is poor historian    Context: Doc Coyne is a 81 y.o. male who presents to the ED c/o a fall that occurred yesterday said to be a mechanical trip and uneven sidewalk.  Patient states bruises left elbow since that time his head and positional neck pain.  He denies numbness and tingling or weakness in bilateral upper and lower extremities.  Patient also states that he has had a slow heart rate and his blood pressure has been low this is been causing some mild dizziness over the past few weeks.  He denies associated chest pain, shortness of breath, fever, chills, nausea, vomiting, cough, leg swelling, recent sick contacts.      Patient was seen this emergency department on 5/13/2021 for chest pain.  His work-up was unremarkable he was discharged to follow-up with cardiology.  He was again in this emergency department on 8/29/2029 for intermittent shortness of breath.  Again, the patient's work-up is unremarkable and he was discharged with conservative measures.  He was also seen on 10/31/2020 for chest pain with an unremarkable work-up and treated conservatively.  Patient is anticoagulated with Xarelto and has chronic A. fib.  It appears he is rate controlled with diltiazem 120 mg in the evening, metoprolol 50 mg in the evening.    On 8/25/2021, the patient had a cardiac cath performed by Dr. Rosado.  The results showed a normal left main, the LAD had a 50% stenosis at the second diagonal branch of the occipital, circumflex and RCA unremarkable.  LV dysfunction EF of 45%.      PAST MEDICAL HISTORY  Active Ambulatory Problems     Diagnosis Date Noted   • Atrial fibrillation (CMS/HCC) 02/08/2016   • Chest pain, atypical 02/08/2016   •  Shortness of breath 02/08/2016   • Preop cardiovascular exam 02/08/2016   • Anticoagulated 05/14/2018   • Atherosclerotic heart disease of native coronary artery without angina pectoris 12/24/2019   • Hypertension 12/24/2019   • Benign prostatic hyperplasia 12/24/2019   • COPD (chronic obstructive pulmonary disease) (CMS/[facility]) 12/24/2019   • Type 2 diabetes mellitus, without long-term current use of insulin (CMS/HCC) 12/24/2019   • Bradycardia 12/24/2019   • Chest pain 12/24/2019   • TIA (transient ischemic attack) 02/27/2020   • Prostate CA (CMS/[facility]) 02/27/2020   • Visual changes 09/21/2020   • Atherosclerosis of native coronary artery of native heart without angina pectoris 06/21/2021   • Essential hypertension 06/21/2021   • Paroxysmal atrial fibrillation (CMS/[facility]) 06/21/2021     Resolved Ambulatory Problems     Diagnosis Date Noted   • No Resolved Ambulatory Problems     Past Medical History:   Diagnosis Date   • Benign prostatic hypertrophy    • Colon polyp    • Diabetes mellitus (CMS/HCC)    • Difficulty walking    • Hyperlipidemia    • Peripheral neuropathy    • Prostate cancer (CMS/HCC)    • Stroke (CMS/HCC)          PAST SURGICAL HISTORY  Past Surgical History:   Procedure Laterality Date   • CARDIAC CATHETERIZATION     • CARDIAC CATHETERIZATION N/A 8/25/2021    Procedure: Left Heart Cath;  Surgeon: Joel Rosado MD;  Location:  [facility] CATH [facility]VASIVE LOCATION;  Service: Cardiology;  Laterality: N/A;   • CARDIAC CATHETERIZATION N/A 8/25/2021    Procedure: Coronary angiography;  Surgeon: Joel Rosado MD;  Location:  [facility] CATH INVASIVE LOCATION;  Service: Cardiology;  Laterality: N/A;   • CARDIAC CATHETERIZATION N/A 8/25/2021    Procedure: Left ventriculography;  Surgeon: Joel Rosaod MD;  Location:  E[facility] CATH INVASIVE LOCATION;  Service: Cardiology;  Laterality: N/A;   • COLONOSCOPY     • HEMORRHOIDECTOMY     • PROSTATE SURGERY           FAMILY HISTORY  Family History   Problem  Relation Age of Onset   • Hypertension Father    • Heart failure Father    • Hyperlipidemia Father    • Heart disease Father    • Heart attack Father    • Heart attack Sister    • Stroke Mother    • Heart disease Mother    • Diabetes Mother    • Kidney disease Maternal Grandmother          SOCIAL HISTORY  Social History     Socioeconomic History   • Marital status:      Spouse name: Not on file   • Number of children: Not on file   • Years of education: Not on file   • Highest education level: Not on file   Tobacco Use   • Smoking status: Former Smoker     Packs/day: 0.50     Types: Cigarettes     Quit date: 2020     Years since quittin.1   • Smokeless tobacco: Never Used   • Tobacco comment: QUIT 2 MONTHS AGO   Vaping Use   • Vaping Use: Never used   Substance and Sexual Activity   • Alcohol use: Yes     Comment: OCCASIONALLY/ wine   • Drug use: No   • Sexual activity: Defer         ALLERGIES  Isosorbide nitrate, Novocain  [procaine], Penicillins, Propoxyphene, Cephalexin, Doxycycline, and Sulfa antibiotics        REVIEW OF SYSTEMS  Review of Systems   Constitutional: Negative for chills and fever.   HENT:        Facial pain   Respiratory: Negative for cough.    Cardiovascular: Negative for chest pain and leg swelling.        Slow heart rate   Gastrointestinal: Negative.    Endocrine: Negative.    Musculoskeletal: Positive for neck pain (Positional).   Skin: Negative.    Neurological: Positive for dizziness.        All systems reviewed and negative except for those discussed in HPI.       PHYSICAL EXAM    I have reviewed the triage vital signs and nursing notes.    ED Triage Vitals [21 1300]   Temp Heart Rate Resp BP SpO2   97.1 °F (36.2 °C) (!) 40 16 97/45 97 %      Temp src Heart Rate Source Patient Position BP Location FiO2 (%)   Tympanic Monitor -- -- --       Physical Exam  GENERAL: Well-nourished, nontoxic, no acute distress  HENT: nares patent, face symmetric  Neck: Mild TTP, no  step-off deformity.  EYES: no scleral icterus, PERRL, EOMs intact   CV: Irregularly irregular and bradycardic.  Initial blood pressure was 97/45  RESPIRATORY: normal effort  ABDOMEN: soft  MUSCULOSKELETAL: no deformity, no left elbow bony tenderness strength 5 of 5 globally  NEURO: alert and oriented x4, moves all extremities, follows commands no focal neuro deficits, cranial nerves II through XII grossly intact.  Patient demonstrates no ataxia or pronator drift on exam.  Gross sensation is intact globally  SKIN: Ecchymosis and small abrasion overlying the left elbow        LAB RESULTS  Recent Results (from the past 24 hour(s))   ECG 12 Lead    Collection Time: 09/13/21  1:36 PM   Result Value Ref Range    QT Interval 464 ms   Green Top (Gel)    Collection Time: 09/13/21  1:56 PM   Result Value Ref Range    Extra Tube Hold for add-ons.    Lavender Top    Collection Time: 09/13/21  1:56 PM   Result Value Ref Range    Extra Tube hold for add-on    Gold Top - SST    Collection Time: 09/13/21  1:56 PM   Result Value Ref Range    Extra Tube Hold for add-ons.    Light Blue Top    Collection Time: 09/13/21  1:56 PM   Result Value Ref Range    Extra Tube hold for add-on    Comprehensive Metabolic Panel    Collection Time: 09/13/21  1:56 PM    Specimen: Blood   Result Value Ref Range    Glucose 114 (H) 65 - 99 mg/dL    BUN 25 (H) 8 - 23 mg/dL    Creatinine 1.18 0.76 - 1.27 mg/dL    Sodium 137 136 - 145 mmol/L    Potassium 5.0 3.5 - 5.2 mmol/L    Chloride 103 98 - 107 mmol/L    CO2 22.6 22.0 - 29.0 mmol/L    Calcium 9.0 8.6 - 10.5 mg/dL    Total Protein 6.9 6.0 - 8.5 g/dL    Albumin 4.40 3.50 - 5.20 g/dL    ALT (SGPT) 35 1 - 41 U/L    AST (SGOT) 28 1 - 40 U/L    Alkaline Phosphatase 55 39 - 117 U/L    Total Bilirubin 0.6 0.0 - 1.2 mg/dL    eGFR Non African Amer 59 (L) >60 mL/min/1.73    Globulin 2.5 gm/dL    A/G Ratio 1.8 g/dL    BUN/Creatinine Ratio 21.2 7.0 - 25.0    Anion Gap 11.4 5.0 - 15.0 mmol/L   Troponin    Collection  Time: 09/13/21  1:56 PM    Specimen: Blood   Result Value Ref Range    Troponin T <0.010 0.000 - 0.030 ng/mL   BNP    Collection Time: 09/13/21  1:56 PM    Specimen: Blood   Result Value Ref Range    proBNP 1,104.0 0.0-1,800.0 pg/mL   CBC Auto Differential    Collection Time: 09/13/21  1:56 PM    Specimen: Blood   Result Value Ref Range    WBC 10.48 3.40 - 10.80 10*3/mm3    RBC 4.68 4.14 - 5.80 10*6/mm3    Hemoglobin 14.5 13.0 - 17.7 g/dL    Hematocrit 46.1 37.5 - 51.0 %    MCV 98.5 (H) 79.0 - 97.0 fL    MCH 31.0 26.6 - 33.0 pg    MCHC 31.5 31.5 - 35.7 g/dL    RDW 12.7 12.3 - 15.4 %    RDW-SD 46.0 37.0 - 54.0 fl    MPV 10.5 6.0 - 12.0 fL    Platelets 162 140 - 450 10*3/mm3    Neutrophil % 54.4 42.7 - 76.0 %    Lymphocyte % 37.9 19.6 - 45.3 %    Monocyte % 5.7 5.0 - 12.0 %    Eosinophil % 0.7 0.3 - 6.2 %    Basophil % 0.5 0.0 - 1.5 %    Immature Grans % 0.8 (H) 0.0 - 0.5 %    Neutrophils, Absolute 5.71 1.70 - 7.00 10*3/mm3    Lymphocytes, Absolute 3.97 (H) 0.70 - 3.10 10*3/mm3    Monocytes, Absolute 0.60 0.10 - 0.90 10*3/mm3    Eosinophils, Absolute 0.07 0.00 - 0.40 10*3/mm3    Basophils, Absolute 0.05 0.00 - 0.20 10*3/mm3    Immature Grans, Absolute 0.08 (H) 0.00 - 0.05 10*3/mm3    nRBC 0.0 0.0 - 0.2 /100 WBC   TSH    Collection Time: 09/13/21  1:56 PM    Specimen: Blood   Result Value Ref Range    TSH 1.200 0.270 - 4.200 uIU/mL   T4, Free    Collection Time: 09/13/21  1:56 PM    Specimen: Blood   Result Value Ref Range    Free T4 1.54 0.93 - 1.70 ng/dL   COVID-19,[facility] IN-HOUSE CEPHEID/FARA NP SWAB IN TRANSPORT MEDIA 8-12 HR TAT - Swab, Nasopharynx    Collection Time: 09/13/21  3:47 PM    Specimen: Nasopharynx; Swab   Result Value Ref Range    COVID19 Not Detected Not Detected - Ref. Range   Lactic Acid, Plasma    Collection Time: 09/13/21  4:25 PM    Specimen: Blood   Result Value Ref Range    Lactate 1.1 0.5 - 2.0 mmol/L       Ordered the above labs and independently reviewed the results.        RADIOLOGY  CT  Head Without Contrast, CT Cervical Spine Without Contrast, CT Facial Bones Without Contrast    Result Date: 2021  CT SCAN OF THE BRAIN WITHOUT CONTRAST AND CT SCAN OF THE FACIAL BONES WITH THIN AXIAL IMAGES AND CORONAL AND SAGITTAL RECONSTRUCTIONS  HISTORY: Fell with head and facial trauma. Headache. On anticoagulation.  TECHNIQUE: The CT scan was performed as an emergency procedure through the brain without contrast followed by additional thin section imaging through the facial bones with coronal and sagittal reconstructions. There is mild diffuse atrophy and chronic small vessel ischemic change similar to the study of 2020. There is no evidence of acute intracranial hemorrhage or mass effect. There is no evidence of facial fracture. There is some extremely minimal mucosal thickening at the floor of the right maxillary sinus and posterior ethmoid air cells. The mastoid air cells are clear.  CT SCAN OF THE CERVICAL SPINE  HISTORY: Fell. Neck pain.  TECHNIQUE: The CT scan was performed as an emergency procedure through the cervical spine and demonstrates the followin. There is no evidence of acute fracture with particular reference to the odontoid. The prevertebral soft tissues appear normal. 2. There is scattered interval change throughout cervical spine with spurring of the lateral facets and uncovertebral joints. This results in some bony foraminal encroachment, particularly at C3-4 on the right and at C4-5 on the left and at C5-6 on the right and at C6-7 on the right. 3. There is a left thyroid nodule with calcified rim measuring 1.5 cm that is unchanged from 2020.      Radiation dose reduction techniques were utilized, including automated exposure control and exposure modulation based on body size.  This report was finalized on 2021 7:14 PM by Dr. Nilesh Sepulveda M.D.      XR Chest 1 View    Result Date: 2021  XR CHEST 1 VW-  Clinical: Shortness of breath, lethargy, hypotension   COMPARISON 8/29/2021  FINDINGS: There is cardiac enlargement. There are calcified benign granulomas seen within both lungs. No effusion, edema or acute airspace disease has developed. The cardiomediastinal silhouette is stable.  CONCLUSION: No acute cardiovascular or pulmonary process is demonstrated. Stable cardiomegaly.  This report was finalized on 9/13/2021 2:59 PM by Dr. Kushal Moncada M.D.        I ordered the above noted radiological studies. Reviewed by me and discussed with radiologist.  See dictation for official radiology interpretation.      PROCEDURES    Procedures      MEDICATIONS GIVEN IN ER    Medications   sodium chloride 0.9 % bolus 1,000 mL (0 mL Intravenous Stopped 9/13/21 1640)         PROGRESS, DATA ANALYSIS, CONSULTS, AND MEDICAL DECISION MAKING    All labs have been independently reviewed by me.  All radiology studies have been reviewed by me and discussed with radiologist dictating the report.   EKG's independently viewed and interpreted by me.  Discussion below represents my analysis of pertinent findings related to patient's condition, differential diagnosis, treatment plan and final disposition.    DDx includes but is not limited to: Head injury with intercranial hemorrhage, head injury without intercranial hemorrhage, cervical strain, C-spine fracture, skull fracture, bradycardia secondary to medication bradycardia secondary to hypothyroid, bradycardia secondary to CAD/ischemic changes, sepsis. Will obtain a CBC, CMP, troponin, EKG, portable chest x-ray, CT head and C-spine without contrast, TSH, free T4, lactic acid.  Please see below for MDM and course of care.    ED Course as of Sep 14 0007   Mon Sep 13, 2021   1503 I viewed the patient's chest x-ray and there is no acute process.    [RC]   1508 EKG          EKG time: [id number]  Rhythm/Rate: 42/A-fib  P waves and MO:   QRS, axis: Normal Axis   ST and T waves: No acute st cgs     Interpreted Contemporaneously by me, independently  viewed  -rate is significantly slower than on [id number] o/w no acute cgs    [RC]   1524 Patient is bradycardic and slightly hypotensive.  He is getting IV fluids at this time.  Given that he recently had an unremarkable cath approximately 2 weeks ago, I doubt his bradycardia and hypertensive ischemia related.  Suspect it is more medication.  The plan at this juncture is to CT the patient's head and C-spine given his blood thinners and recent fall.  To see how the patient responds to IV fluids and place a call to Dr. Rosado to discuss further management.    [RC]   1530 Also of note is that the patient has been complaining of facial pain to the RN and triage staff.  He informed me it was primarily his neck, but given his recent fall and blood thinners will include facial bones with his CT head and C-spine.    [RC]   1534 Given the patient's bradycardia and hypotension I am anticipating admission at this time for the patient safety.  Although I strongly feel this is likely medication related I do not want to send this patient home to fall again and fracture hip.  I think observation and medication adjustments may be the most beneficial course of care for this patient.  We will add COVID-19 testing at this time.    [RC]   1724 Patient's work-up is back and is unremarkable.  Call placed to Dr. Rosado to discuss medical management of the patient's bradycardia and hypotension.    [RC]   1736 Just reassessed the patient his heart rate is anywhere from 52- -58 and remains irregularly irregular.  He states he is feeling much better after the IV fluids and is otherwise asymptomatic.    [RC]   1800 Discussed the patient's with the on-call cardiologist for Dr. Rosado.  He is in agreement with stopping the metoprolol temporarily.  Should the patient still have symptoms and remained bradycardic he is then to stop the Cardizem.  He is to call and follow-up with Dr. Rosado at first available opportunity.  He  is to return to the emergency department should symptoms change or worsen.    [RC]      ED Course User Index  [RC] Christopher Duvall III, PA       Patient was placed in face mask in first look. Patient was wearing facemask when I entered the room and throughout our encounter. I wore full protective equipment throughout this patient encounter including a face mask, and gloves. Hand hygiene was performed before donning protective equipment and after removal when leaving the room.      AS OF 00:07 EDT VITALS:    BP - 122/71  HR - 60  TEMP - 97.1 °F (36.2 °C) (Tympanic)  O2 SATS - 98%        DIAGNOSIS  Final diagnoses:   Longstanding persistent atrial fibrillation (CMS/HCC)   Bradycardia   Acute nonintractable headache, unspecified headache type   Neck pain         DISPOSITION  DISCHARGE    Patient discharged in stable condition.    Reviewed implications of results, diagnosis, meds, responsibility to follow up, warning signs and symptoms of possible worsening, potential complications and reasons to return to ER.    Patient/Family voiced understanding of above instructions.    Discussed plan for discharge, as there is no emergent indication for admission. Patient referred to primary care provider for BP management due to today's BP. Pt/family is agreeable and understands need for follow up and repeat testing.  Pt is aware that discharge does not mean that nothing is wrong but it indicates no emergency is present that requires admission and they must continue care with follow-up as given below or physician of their choice.     FOLLOW-UP  Joel Rosado MD  [address] [facility] [address] [phone]    Schedule an appointment as soon as possible for a visit   For further evaluation and treatment of your bradycardia    Luiz Nicolas MD  [facility] [facility] [address] [phone]    Schedule an appointment as soon as possible for a visit   For further evaluation and treatment of your  headache and neck pain         Medication List      No changes were made to your prescriptions during this visit.                Christopher Duvall III, PA  09/14/21 0007

## 2021-09-13 NOTE — ED PROVIDER NOTES
Pt presents to the ED c/o  mild dizziness for the last couple weeks.  He actually also had a fall yesterday after tripping on uneven sidewalk.  Since then he has had some head and neck pain.  He has a history of atrial fibrillation.  He reports compliance with his diltiazem and metoprolol.  He usually takes these medicines at night and has not yet had them today.     On exam,   Awake and alert, no acute distress.  Patient ambulates steadily, no ataxia.     Plan: Obtain CT head and cervical spine given his fall, chronic anticoagulation, and complaint of head and neck pain.  His heart rate has improved from 40s on arrival to the mid 50s now.  He reports that his symptoms of dizziness has also improved.  Consult with his cardiologist regarding possible titration of his rate control medications.      I wore a mask, face shield, and gloves during this patient encounter.  Patient also wearing a surgical mask.  Hand hygeine performed before and after seeing the patient.     Attestation:  The GISELLA and I have discussed this patient's history, physical exam, and treatment plan.  I have reviewed the documentation and personally had a face to face interaction with the patient. I affirm the documentation and agree with the treatment and plan.  The attached note describes my personal findings.            Luke Arreguin MD  09/13/21 [id number]

## 2021-09-13 NOTE — DISCHARGE INSTRUCTIONS
Continue with your Cardizem and all your maintenance medications except the metoprolol tartrate.  Due to your bradycardia hold the metoprolol tartrate until you are able to follow-up with Dr. Rosado.  Return to the emergency department should your symptoms change or worsen.

## 2021-09-13 NOTE — ED TRIAGE NOTES
Pressure runs up and down his face x 1 week    Patient was placed in face mask during first look triage.  Patient was wearing a face mask throughout encounter.  I wore personal protective equipment throughout the encounter.  Hand hygiene was performed before and after patient encounter.

## 2021-09-16 ENCOUNTER — TELEPHONE (OUTPATIENT)
Dept: CARDIOLOGY | Facility: CLINIC | Age: 81
End: 2021-09-16

## 2021-09-16 NOTE — TELEPHONE ENCOUNTER
DELETE AFTER REVIEWING: Telephone encounter to be sent to the  pool     Caller: EARL RHODES     Relationship: SELF     Best call back number:  [phone]       What is the best time to reach you: ANY    Who are you requesting to speak with (clinical staff, provider,  specific staff member):     What was the call regarding: PATIENT WAS PRESCRIBED SOME MEDICATION AT THE HOSPITAL AND WANTS TO MAKE SURE HE IS OKAY TO TAKE THEM. NEEDS  AUTHORIZATION.    Do you require a callback: YES

## 2021-09-16 NOTE — TELEPHONE ENCOUNTER
Caller: Doc Coyne    Relationship: Self    Best call back number: [phone] (M)  Medication needed:   Requested Prescriptions     Pending Prescriptions Disp Refills   • metFORMIN (GLUCOPHAGE) 500 MG tablet 30 tablet 1     Sig: Take 1 tablet by mouth Daily With Breakfast.       When do you need the refill by: 09/16/21    What additional details did the patient provide when requesting the medication: PATIENT STATES WILL BE OUT AFTER TODAY    Does the patient have less than a 3 day supply:  [x] Yes  [] No    What is the patient's preferred pharmacy: [id number] [place], [place] - [address] AT [facility] [address] [facility] [phone] [facility] [phone]

## 2021-09-23 NOTE — TELEPHONE ENCOUNTER
Called and s/w pt. He had a question RE Cardizem that was stopped in hospital. I instructed pt of why it was held and pt to discuss more with Dr ALBARADO at [facility].  Pt also is needing Xarelto 20 mg Samples.

## 2021-09-24 ENCOUNTER — TELEPHONE (OUTPATIENT)
Dept: CARDIOLOGY | Facility: CLINIC | Age: 81
End: 2021-09-24

## 2021-09-24 NOTE — TELEPHONE ENCOUNTER
----- Message from Irma Agosto MA sent at 9/23/2021  4:20 PM EDT -----  S/w pt he is needing Xarelto 20 mg Samples. Can you get ready and call him when ready for   Thanks  irma    Samples are ready for . Called patient no answer but will call again     Patient informed and are picking up samples

## 2021-10-11 ENCOUNTER — OFFICE VISIT (OUTPATIENT)
Dept: CARDIOLOGY | Facility: CLINIC | Age: 81
End: 2021-10-11

## 2021-10-11 VITALS
HEART RATE: 73 BPM | WEIGHT: 197 LBS | BODY MASS INDEX: 26.68 KG/M2 | HEIGHT: 72 IN | SYSTOLIC BLOOD PRESSURE: 160 MMHG | DIASTOLIC BLOOD PRESSURE: 80 MMHG

## 2021-10-11 DIAGNOSIS — I10 PRIMARY HYPERTENSION: ICD-10-CM

## 2021-10-11 DIAGNOSIS — I48.0 PAROXYSMAL ATRIAL FIBRILLATION (HCC): ICD-10-CM

## 2021-10-11 DIAGNOSIS — I25.10 ATHEROSCLEROSIS OF NATIVE CORONARY ARTERY OF NATIVE HEART WITHOUT ANGINA PECTORIS: Primary | ICD-10-CM

## 2021-10-11 PROCEDURE — 99213 OFFICE O/P EST LOW 20 MIN: CPT | Performed by: INTERNAL MEDICINE

## 2021-10-29 ENCOUNTER — TELEPHONE (OUTPATIENT)
Dept: CARDIOLOGY | Facility: CLINIC | Age: 81
End: 2021-10-29

## 2021-10-29 NOTE — TELEPHONE ENCOUNTER
PT CUT HIMSELF AND IS CONCERNED THAT HIS BLOOD IS TOO THIN.  HE WANTS TO KNOW IF HE SHOULD CUT BACK ON HIS BLOOD THINNER.  PLEASE CALL TODAY [phone].  THANKS

## 2021-10-29 NOTE — TELEPHONE ENCOUNTER
Tried calling the patient back to find out more about his cut. No vm    Tried calling again no vm

## 2021-11-10 RX ORDER — METOPROLOL TARTRATE 50 MG/1
50 TABLET, FILM COATED ORAL DAILY
Qty: 90 TABLET | Refills: 0 | Status: SHIPPED | OUTPATIENT
Start: 2021-11-10 | End: 2022-01-10

## 2022-01-06 ENCOUNTER — TELEPHONE (OUTPATIENT)
Dept: CARDIOLOGY | Facility: CLINIC | Age: 82
End: 2022-01-06

## 2022-01-06 NOTE — TELEPHONE ENCOUNTER
----- Message from Kathy Corea sent at 1/6/2022 12:36 PM EST -----  Regarding: SAMPLES OF XARELTO RAN OUT 2 DAYS AGO WOULD LIKE TO P/U TOMORROW  Contact: [phone]    Samples are ready for  and patient informed

## 2022-01-10 RX ORDER — METOPROLOL TARTRATE 50 MG/1
50 TABLET, FILM COATED ORAL DAILY
Qty: 90 TABLET | Refills: 1 | Status: SHIPPED | OUTPATIENT
Start: 2022-01-10 | End: 2022-07-25

## 2022-01-13 RX ORDER — LISINOPRIL 30 MG/1
30 TABLET ORAL DAILY
Qty: 30 TABLET | Refills: 2 | Status: SHIPPED | OUTPATIENT
Start: 2022-01-13 | End: 2022-05-25

## 2022-01-13 RX ORDER — ROSUVASTATIN CALCIUM 10 MG/1
10 TABLET, COATED ORAL NIGHTLY
Qty: 90 TABLET | Refills: 1 | Status: SHIPPED | OUTPATIENT
Start: 2022-01-13 | End: 2022-11-20

## 2022-01-21 ENCOUNTER — OFFICE VISIT (OUTPATIENT)
Dept: ORTHOPEDIC SURGERY | Facility: CLINIC | Age: 82
End: 2022-01-21

## 2022-01-21 VITALS — TEMPERATURE: 96.9 F | BODY MASS INDEX: 26.14 KG/M2 | HEIGHT: 72 IN | WEIGHT: 193 LBS

## 2022-01-21 DIAGNOSIS — M17.0 PRIMARY OSTEOARTHRITIS OF BOTH KNEES: ICD-10-CM

## 2022-01-21 DIAGNOSIS — M25.562 LEFT KNEE PAIN, UNSPECIFIED CHRONICITY: Primary | ICD-10-CM

## 2022-01-21 PROCEDURE — 73562 X-RAY EXAM OF KNEE 3: CPT | Performed by: ORTHOPAEDIC SURGERY

## 2022-01-21 PROCEDURE — 99213 OFFICE O/P EST LOW 20 MIN: CPT | Performed by: ORTHOPAEDIC SURGERY

## 2022-01-21 PROCEDURE — 20610 DRAIN/INJ JOINT/BURSA W/O US: CPT | Performed by: ORTHOPAEDIC SURGERY

## 2022-01-21 RX ORDER — TRIAMCINOLONE ACETONIDE 40 MG/ML
40 INJECTION, SUSPENSION INTRA-ARTICULAR; INTRAMUSCULAR
Status: COMPLETED | OUTPATIENT
Start: 2022-01-21 | End: 2022-01-21

## 2022-01-21 RX ADMIN — TRIAMCINOLONE ACETONIDE 40 MG: 40 INJECTION, SUSPENSION INTRA-ARTICULAR; INTRAMUSCULAR at 12:05

## 2022-01-21 RX ADMIN — TRIAMCINOLONE ACETONIDE 40 MG: 40 INJECTION, SUSPENSION INTRA-ARTICULAR; INTRAMUSCULAR at 12:04

## 2022-01-21 NOTE — PROGRESS NOTES
Patient: Doc Coyne  YOB: 1940  Date of Service: 1/21/2022    Chief Complaints:   Chief Complaint   Patient presents with   • Right Knee - Follow-up, Pain   • Left Knee - Follow-up, Pain       Subjective:    History of Present Illness: Pt is seen in the office today with complaints of bilateral knee pain have seen in the past for both knees he has known degenerative changes in both the last one we injected was August we injected the right he states both he has had a new event on the right knee which was a hyperextension type injury where he states his knee just kind of gave out.  He is worried he is going to fall which prompted his return appointment.  His symptoms are moderate intermittent states to do everything conservative but if his symptoms persist he is interested in doing something such as a knee replacement past medical history medications are all well listed below he is a non-insulin-dependent diabetic he is on anticoagulation  Chief Complaint   Patient presents with   • Right Knee - Follow-up, Pain   • Left Knee - Follow-up, Pain   .          Allergies:   Allergies   Allergen Reactions   • Isosorbide Nitrate    • Novocain  [Procaine]    • Penicillins    • Propoxyphene    • Cephalexin Rash   • Doxycycline Rash   • Sulfa Antibiotics Rash       Medications:   Home Medications:  Current Outpatient Medications on File Prior to Visit   Medication Sig   • dilTIAZem CD (CARDIZEM CD) 120 MG 24 hr capsule TAKE 1 CAPSULE BY MOUTH DAILY   • metFORMIN (GLUCOPHAGE) 500 MG tablet Take 1 tablet by mouth Daily With Breakfast.   • metoprolol tartrate (LOPRESSOR) 50 MG tablet TAKE 1 TABLET BY MOUTH DAILY   • rivaroxaban (Xarelto) 20 MG tablet Take 1 tablet by mouth Daily.   • lisinopril (PRINIVIL,ZESTRIL) 30 MG tablet Take 1 tablet by mouth Daily.   • pantoprazole (PROTONIX) 40 MG EC tablet Take 1 tablet by mouth Daily.   • rosuvastatin (CRESTOR) 10 MG tablet Take 1 tablet by mouth Every Night.   •  [DISCONTINUED] loratadine (Claritin) 10 MG tablet Take 1 tablet by mouth Daily.   • [DISCONTINUED] nitroglycerin (NITROSTAT) 0.4 MG SL tablet Place 1 tablet under the tongue Every 5 (Five) Minutes As Needed for Chest Pain. Take no more than 3 doses in 15 minutes.     No current facility-administered medications on file prior to visit.     Current Medications:  Scheduled Meds:  Continuous Infusions:No current facility-administered medications for this visit.    PRN Meds:.    I have reviewed the patient's medical history in detail and updated the computerized patient record.  Review and summarization of old records include:    Past Medical History:   Diagnosis Date   • Atherosclerotic heart disease of native coronary artery without angina pectoris    • Atrial fibrillation (HCC)    • Benign prostatic hypertrophy    • Chest pain    • Colon polyp    • COPD (chronic obstructive pulmonary disease) (HCC)    • Diabetes mellitus (HCC)    • Difficulty walking    • Hyperlipidemia    • Hypertension    • Peripheral neuropathy    • Prostate cancer (HCC)    • Stroke (HCC)         Past Surgical History:   Procedure Laterality Date   • CARDIAC CATHETERIZATION     • CARDIAC CATHETERIZATION N/A 8/25/2021    Procedure: Left Heart Cath;  Surgeon: Joel Rosado MD;  Location: [facility] INVASIVE LOCATION;  Service: Cardiology;  Laterality: N/A;   • CARDIAC CATHETERIZATION N/A 8/25/2021    Procedure: Coronary angiography;  Surgeon: Joel Rosado MD;  Location: [facility] CATH INVASIVE LOCATION;  Service: Cardiology;  Laterality: N/A;   • CARDIAC CATHETERIZATION N/A 8/25/2021    Procedure: Left ventriculography;  Surgeon: Joel Rosado MD;  Location: [facility] CATH INVASIVE LOCATION;  Service: Cardiology;  Laterality: N/A;   • COLONOSCOPY     • HEMORRHOIDECTOMY     • PROSTATE SURGERY          Social History     Occupational History   • Not on file   Tobacco Use   • Smoking status: Former Smoker     Packs/day: 0.50      Types: Cigarettes     Quit date: 2020     Years since quittin.4   • Smokeless tobacco: Never Used   • Tobacco comment: QUIT 2 MONTHS AGO   Vaping Use   • Vaping Use: Never used   Substance and Sexual Activity   • Alcohol use: Yes     Comment: OCCASIONALLY/ wine   • Drug use: No   • Sexual activity: Defer      Social History     Social History Narrative   • Not on file        Family History   Problem Relation Age of Onset   • Hypertension Father    • Heart failure Father    • Hyperlipidemia Father    • Heart disease Father    • Heart attack Father    • Heart attack Sister    • Stroke Mother    • Heart disease Mother    • Diabetes Mother    • Kidney disease Maternal Grandmother        ROS: 14 point review of systems was performed and was negative except for documented findings in HPI and today's encounter.     Allergies:   Allergies   Allergen Reactions   • Isosorbide Nitrate    • Novocain  [Procaine]    • Penicillins    • Propoxyphene    • Cephalexin Rash   • Doxycycline Rash   • Sulfa Antibiotics Rash     Constitutional:  Denies fever, shaking or chills   Eyes:  Denies change in visual acuity   HENT:  Denies nasal congestion or sore throat   Respiratory:  Denies cough or shortness of breath   Cardiovascular:  Denies chest pain or severe LE edema   GI:  Denies abdominal pain, nausea, vomiting, bloody stools or diarrhea   Musculoskeletal:  Numbness, tingling, or loss of motor function only as noted above in history of present illness.  : Denies painful urination or hematuria  Integument:  Denies rash, lesion or ulceration   Neurologic:  Denies headache or focal weakness  Endocrine:  Denies lymphadenopathy  Psych:  Denies confusion or change in mental status   Hem:  Denies active bleeding      Physical Exam: 81 y.o. male  Wt Readings from Last 3 Encounters:   22 87.5 kg (193 lb)   10/11/21 89.4 kg (197 lb)   21 87.5 kg (193 lb)       Body mass index is 26.18 kg/m².  Facility age limit for growth  percentiles is 20 years.  Vitals:    01/21/22 1147   Temp: 96.9 °F (36.1 °C)     Vital signs reviewed.   General Appearance:    Alert, cooperative, in no acute distress                    Ortho exam    physical exam the right  knee she has mild effusion no overlying skin changes no lymphedema no lymphadenopathy.  She is sitting in a genu valgum position.  She has -5° of extension flexion is to 125 she has palpable tenderness laterally more than medially and crepitus with range of motion.  Her calf is soft and nontender    Physical exam of the left knee reveals no effusion, no erythema.  It mild loss of extension and full flexion  Patient has mild varus alignment.  They have mild tenderness to palpation about the medial compartment, no tenderness laterally..  The patient has a negative bounce home, negative Myron and a stable ligamentous exam.  Quad tone is reasonable and symmetric.  There are no overlying skin changes no lymphedema no lymphadenopathy.  There is good hip range of motion which is full symmetric and asymptomatic and a normal ankle exam.      X-rays AP lateral merchant view both knees were taken to evaluate his symptoms and compared to x-rays done in August of last year he does have severe patellofemoral OA left is much worse than right he has  severe right lateral OA on the left he has moderate medial compartment OA no significant change  .time    Assessment: Bilateral knee DJD with a new event to his right knee which was a hyperextension type injury.  I am a little worried about his risk of falling.  He has significant degenerative changes of both I think an injection is quite reasonable however I did tell him if his symptoms do not significantly improved I would have him see Dr. Sotomayor for an evaluation    Plan:   Follow up as indicated.  Ice, elevate, and rest as needed.  Discussed conservative measures of pain control including ice, bracing.  Also talked about the importance of strengthening and  maintaining ideal body weight    Leticia Aguirre M.D.      Large Joint Arthrocentesis: R knee  Date/Time: 1/21/2022 12:04 PM  Consent given by: patient  Site marked: site marked  Timeout: Immediately prior to procedure a time out was called to verify the correct patient, procedure, equipment, support staff and site/side marked as required   Supporting Documentation  Indications: pain   Procedure Details  Location: knee - R knee  Preparation: Patient was prepped and draped in the usual sterile fashion  Needle gauge: 21G.  Approach: anteromedial  Medications administered: 4 mL lidocaine (cardiac); 40 mg triamcinolone acetonide 40 MG/ML  Patient tolerance: patient tolerated the procedure well with no immediate complications    Large Joint Arthrocentesis: L knee  Date/Time: 1/21/2022 12:05 PM  Consent given by: patient  Site marked: site marked  Timeout: Immediately prior to procedure a time out was called to verify the correct patient, procedure, equipment, support staff and site/side marked as required   Supporting Documentation  Indications: pain   Procedure Details  Location: knee - L knee  Preparation: Patient was prepped and draped in the usual sterile fashion  Needle gauge: 21G.  Approach: anteromedial  Medications administered: 4 mL lidocaine (cardiac); 40 mg triamcinolone acetonide 40 MG/ML  Patient tolerance: patient tolerated the procedure well with no immediate complications

## 2022-02-22 ENCOUNTER — HOSPITAL ENCOUNTER (OUTPATIENT)
Dept: CARDIOLOGY | Facility: HOSPITAL | Age: 82
Discharge: HOME | End: 2022-02-22

## 2022-02-22 ENCOUNTER — OFFICE VISIT (OUTPATIENT)
Dept: CARDIOLOGY | Facility: CLINIC | Age: 82
End: 2022-02-22

## 2022-02-22 VITALS
WEIGHT: 195 LBS | SYSTOLIC BLOOD PRESSURE: 131 MMHG | HEIGHT: 72 IN | HEART RATE: 68 BPM | DIASTOLIC BLOOD PRESSURE: 66 MMHG | BODY MASS INDEX: 26.41 KG/M2

## 2022-02-22 DIAGNOSIS — I48.21 PERMANENT ATRIAL FIBRILLATION: ICD-10-CM

## 2022-02-22 DIAGNOSIS — R06.02 SHORTNESS OF BREATH: ICD-10-CM

## 2022-02-22 DIAGNOSIS — I25.118 CORONARY ARTERY DISEASE OF NATIVE ARTERY OF NATIVE HEART WITH STABLE ANGINA PECTORIS: ICD-10-CM

## 2022-02-22 DIAGNOSIS — Z01.818 PRE-OP TESTING: ICD-10-CM

## 2022-02-22 DIAGNOSIS — I10 PRIMARY HYPERTENSION: ICD-10-CM

## 2022-02-22 DIAGNOSIS — Z79.01 ANTICOAGULATED: ICD-10-CM

## 2022-02-22 DIAGNOSIS — R07.89 CHEST PAIN, ATYPICAL: Primary | ICD-10-CM

## 2022-02-22 LAB — D DIMER PPP FEU-MCNC: 0.27 MCGFEU/ML (ref 0–0.49)

## 2022-02-22 PROCEDURE — 99214 OFFICE O/P EST MOD 30 MIN: CPT

## 2022-02-22 PROCEDURE — 36415 COLL VENOUS BLD VENIPUNCTURE: CPT

## 2022-02-22 PROCEDURE — 85379 FIBRIN DEGRADATION QUANT: CPT

## 2022-02-22 PROCEDURE — 93000 ELECTROCARDIOGRAM COMPLETE: CPT

## 2022-02-22 RX ORDER — LORATADINE 10 MG/1
10 TABLET ORAL DAILY
COMMUNITY
Start: 2021-12-22

## 2022-03-06 ENCOUNTER — HOSPITAL ENCOUNTER (OUTPATIENT)
Facility: HOSPITAL | Age: 82
Setting detail: OBSERVATION
Discharge: HOME | End: 2022-03-07
Attending: EMERGENCY MEDICINE | Admitting: EMERGENCY MEDICINE

## 2022-03-06 ENCOUNTER — APPOINTMENT (OUTPATIENT)
Dept: GENERAL RADIOLOGY | Facility: HOSPITAL | Age: 82
End: 2022-03-06

## 2022-03-06 DIAGNOSIS — R07.9 CHEST PAIN IN ADULT: Primary | ICD-10-CM

## 2022-03-06 LAB
ALBUMIN SERPL-MCNC: 4.1 G/DL (ref 3.5–5.2)
ALBUMIN/GLOB SERPL: 1.8 G/DL
ALP SERPL-CCNC: 41 U/L (ref 39–117)
ALT SERPL W P-5'-P-CCNC: 33 U/L (ref 1–41)
ANION GAP SERPL CALCULATED.3IONS-SCNC: 8 MMOL/L (ref 5–15)
AST SERPL-CCNC: 16 U/L (ref 1–40)
BILIRUB SERPL-MCNC: 0.4 MG/DL (ref 0–1.2)
BUN SERPL-MCNC: 30 MG/DL (ref 8–23)
BUN/CREAT SERPL: 34.1 (ref 7–25)
CALCIUM SPEC-SCNC: 8.8 MG/DL (ref 8.6–10.5)
CHLORIDE SERPL-SCNC: 104 MMOL/L (ref 98–107)
CHOLEST SERPL-MCNC: 121 MG/DL (ref 0–200)
CO2 SERPL-SCNC: 26 MMOL/L (ref 22–29)
CREAT SERPL-MCNC: 0.88 MG/DL (ref 0.76–1.27)
DEPRECATED RDW RBC AUTO: 45.8 FL (ref 37–54)
EGFRCR SERPLBLD CKD-EPI 2021: 86.9 ML/MIN/1.73
EOSINOPHIL # BLD MANUAL: 0.1 10*3/MM3 (ref 0–0.4)
EOSINOPHIL NFR BLD MANUAL: 1 % (ref 0.3–6.2)
ERYTHROCYTE [DISTWIDTH] IN BLOOD BY AUTOMATED COUNT: 13.5 % (ref 12.3–15.4)
GLOBULIN UR ELPH-MCNC: 2.3 GM/DL
GLUCOSE SERPL-MCNC: 101 MG/DL (ref 65–99)
HCT VFR BLD AUTO: 40.5 % (ref 37.5–51)
HDLC SERPL-MCNC: 52 MG/DL (ref 40–60)
HGB BLD-MCNC: 14 G/DL (ref 13–17.7)
LDLC SERPL CALC-MCNC: 59 MG/DL (ref 0–100)
LDLC/HDLC SERPL: 1.18 {RATIO}
LYMPHOCYTES # BLD MANUAL: 5.97 10*3/MM3 (ref 0.7–3.1)
LYMPHOCYTES NFR BLD MANUAL: 4 % (ref 5–12)
MCH RBC QN AUTO: 31.6 PG (ref 26.6–33)
MCHC RBC AUTO-ENTMCNC: 34.6 G/DL (ref 31.5–35.7)
MCV RBC AUTO: 91.4 FL (ref 79–97)
MONOCYTES # BLD: 0.41 10*3/MM3 (ref 0.1–0.9)
NEUTROPHILS # BLD AUTO: 3.81 10*3/MM3 (ref 1.7–7)
NEUTROPHILS NFR BLD MANUAL: 37 % (ref 42.7–76)
NT-PROBNP SERPL-MCNC: 503 PG/ML (ref 0–1800)
PLAT MORPH BLD: NORMAL
PLATELET # BLD AUTO: 127 10*3/MM3 (ref 140–450)
PMV BLD AUTO: 10 FL (ref 6–12)
POTASSIUM SERPL-SCNC: 4.4 MMOL/L (ref 3.5–5.2)
PROT SERPL-MCNC: 6.4 G/DL (ref 6–8.5)
RBC # BLD AUTO: 4.43 10*6/MM3 (ref 4.14–5.8)
RBC MORPH BLD: NORMAL
SARS-COV-2 RNA PNL SPEC NAA+PROBE: NOT DETECTED
SODIUM SERPL-SCNC: 138 MMOL/L (ref 136–145)
TRIGL SERPL-MCNC: 38 MG/DL (ref 0–150)
TROPONIN T SERPL-MCNC: <0.01 NG/ML (ref 0–0.03)
VARIANT LYMPHS NFR BLD MANUAL: 3 % (ref 0–5)
VARIANT LYMPHS NFR BLD MANUAL: 55 % (ref 19.6–45.3)
VLDLC SERPL-MCNC: 10 MG/DL (ref 5–40)
WBC MORPH BLD: NORMAL
WBC NRBC COR # BLD: 10.3 10*3/MM3 (ref 3.4–10.8)

## 2022-03-06 PROCEDURE — 93010 ELECTROCARDIOGRAM REPORT: CPT | Performed by: INTERNAL MEDICINE

## 2022-03-06 PROCEDURE — 93005 ELECTROCARDIOGRAM TRACING: CPT | Performed by: EMERGENCY MEDICINE

## 2022-03-06 PROCEDURE — 84484 ASSAY OF TROPONIN QUANT: CPT | Performed by: EMERGENCY MEDICINE

## 2022-03-06 PROCEDURE — G0378 HOSPITAL OBSERVATION PER HR: HCPCS

## 2022-03-06 PROCEDURE — C9803 HOPD COVID-19 SPEC COLLECT: HCPCS

## 2022-03-06 PROCEDURE — 83880 ASSAY OF NATRIURETIC PEPTIDE: CPT | Performed by: EMERGENCY MEDICINE

## 2022-03-06 PROCEDURE — 99284 EMERGENCY DEPT VISIT MOD MDM: CPT

## 2022-03-06 PROCEDURE — 85007 BL SMEAR W/DIFF WBC COUNT: CPT | Performed by: EMERGENCY MEDICINE

## 2022-03-06 PROCEDURE — 87635 SARS-COV-2 COVID-19 AMP PRB: CPT | Performed by: EMERGENCY MEDICINE

## 2022-03-06 PROCEDURE — 80053 COMPREHEN METABOLIC PANEL: CPT | Performed by: EMERGENCY MEDICINE

## 2022-03-06 PROCEDURE — 71045 X-RAY EXAM CHEST 1 VIEW: CPT

## 2022-03-06 PROCEDURE — 80061 LIPID PANEL: CPT | Performed by: PHYSICIAN ASSISTANT

## 2022-03-06 PROCEDURE — 85025 COMPLETE CBC W/AUTO DIFF WBC: CPT | Performed by: EMERGENCY MEDICINE

## 2022-03-06 RX ORDER — ONDANSETRON 2 MG/ML
4 INJECTION INTRAMUSCULAR; INTRAVENOUS EVERY 6 HOURS PRN
Status: DISCONTINUED | OUTPATIENT
Start: 2022-03-06 | End: 2022-03-07 | Stop reason: HOSPADM

## 2022-03-06 RX ORDER — PANTOPRAZOLE SODIUM 40 MG/1
40 TABLET, DELAYED RELEASE ORAL DAILY
Status: DISCONTINUED | OUTPATIENT
Start: 2022-03-07 | End: 2022-03-07 | Stop reason: HOSPADM

## 2022-03-06 RX ORDER — DEXTROSE MONOHYDRATE 25 G/50ML
25 INJECTION, SOLUTION INTRAVENOUS
Status: DISCONTINUED | OUTPATIENT
Start: 2022-03-06 | End: 2022-03-07 | Stop reason: HOSPADM

## 2022-03-06 RX ORDER — ACETAMINOPHEN 650 MG/1
650 SUPPOSITORY RECTAL EVERY 4 HOURS PRN
Status: DISCONTINUED | OUTPATIENT
Start: 2022-03-06 | End: 2022-03-07 | Stop reason: HOSPADM

## 2022-03-06 RX ORDER — ACETAMINOPHEN 160 MG/5ML
650 SOLUTION ORAL EVERY 4 HOURS PRN
Status: DISCONTINUED | OUTPATIENT
Start: 2022-03-06 | End: 2022-03-07 | Stop reason: HOSPADM

## 2022-03-06 RX ORDER — ROSUVASTATIN CALCIUM 20 MG/1
10 TABLET, COATED ORAL NIGHTLY
Status: DISCONTINUED | OUTPATIENT
Start: 2022-03-07 | End: 2022-03-07 | Stop reason: HOSPADM

## 2022-03-06 RX ORDER — ACETAMINOPHEN 325 MG/1
650 TABLET ORAL EVERY 4 HOURS PRN
Status: DISCONTINUED | OUTPATIENT
Start: 2022-03-06 | End: 2022-03-07 | Stop reason: HOSPADM

## 2022-03-06 RX ORDER — NICOTINE POLACRILEX 4 MG
15 LOZENGE BUCCAL
Status: DISCONTINUED | OUTPATIENT
Start: 2022-03-06 | End: 2022-03-07 | Stop reason: HOSPADM

## 2022-03-06 RX ORDER — INSULIN LISPRO 100 [IU]/ML
0-7 INJECTION, SOLUTION INTRAVENOUS; SUBCUTANEOUS
Status: DISCONTINUED | OUTPATIENT
Start: 2022-03-07 | End: 2022-03-07 | Stop reason: HOSPADM

## 2022-03-06 RX ORDER — NITROGLYCERIN 0.4 MG/1
0.4 TABLET SUBLINGUAL
Status: DISCONTINUED | OUTPATIENT
Start: 2022-03-06 | End: 2022-03-07 | Stop reason: HOSPADM

## 2022-03-06 RX ORDER — SODIUM CHLORIDE 0.9 % (FLUSH) 0.9 %
10 SYRINGE (ML) INJECTION AS NEEDED
Status: DISCONTINUED | OUTPATIENT
Start: 2022-03-06 | End: 2022-03-07 | Stop reason: HOSPADM

## 2022-03-06 RX ORDER — CHOLECALCIFEROL (VITAMIN D3) 125 MCG
5 CAPSULE ORAL NIGHTLY PRN
Status: DISCONTINUED | OUTPATIENT
Start: 2022-03-06 | End: 2022-03-07 | Stop reason: HOSPADM

## 2022-03-06 RX ORDER — SODIUM CHLORIDE 0.9 % (FLUSH) 0.9 %
10 SYRINGE (ML) INJECTION EVERY 12 HOURS SCHEDULED
Status: DISCONTINUED | OUTPATIENT
Start: 2022-03-06 | End: 2022-03-07 | Stop reason: HOSPADM

## 2022-03-06 RX ORDER — ONDANSETRON 4 MG/1
4 TABLET, FILM COATED ORAL EVERY 6 HOURS PRN
Status: DISCONTINUED | OUTPATIENT
Start: 2022-03-06 | End: 2022-03-07 | Stop reason: HOSPADM

## 2022-03-07 ENCOUNTER — APPOINTMENT (OUTPATIENT)
Dept: NUCLEAR MEDICINE | Facility: HOSPITAL | Age: 82
End: 2022-03-07

## 2022-03-07 ENCOUNTER — APPOINTMENT (OUTPATIENT)
Dept: CARDIOLOGY | Facility: HOSPITAL | Age: 82
End: 2022-03-07

## 2022-03-07 VITALS
SYSTOLIC BLOOD PRESSURE: 136 MMHG | HEART RATE: 64 BPM | TEMPERATURE: 98.2 F | WEIGHT: 193 LBS | HEIGHT: 73 IN | RESPIRATION RATE: 16 BRPM | DIASTOLIC BLOOD PRESSURE: 68 MMHG | BODY MASS INDEX: 25.58 KG/M2 | OXYGEN SATURATION: 99 %

## 2022-03-07 LAB
ANION GAP SERPL CALCULATED.3IONS-SCNC: 8.7 MMOL/L (ref 5–15)
AORTIC DIMENSIONLESS INDEX: 0.8 (DI)
BH CV ECHO MEAS - ACS: 1.79 CM
BH CV ECHO MEAS - AO MAX PG: 8.9 MMHG
BH CV ECHO MEAS - AO MEAN PG: 5.3 MMHG
BH CV ECHO MEAS - AO ROOT DIAM: 3.8 CM
BH CV ECHO MEAS - AO V2 MAX: 149.3 CM/SEC
BH CV ECHO MEAS - AO V2 VTI: 34 CM
BH CV ECHO MEAS - AVA(I,D): 2.7 CM2
BH CV ECHO MEAS - EDV(CUBED): 107.4 ML
BH CV ECHO MEAS - EDV(MOD-SP2): 115 ML
BH CV ECHO MEAS - EDV(MOD-SP4): 77 ML
BH CV ECHO MEAS - EF(MOD-BP): 60 %
BH CV ECHO MEAS - EF(MOD-SP2): 49.6 %
BH CV ECHO MEAS - EF(MOD-SP4): 55.8 %
BH CV ECHO MEAS - ESV(CUBED): 39.2 ML
BH CV ECHO MEAS - ESV(MOD-SP2): 58 ML
BH CV ECHO MEAS - ESV(MOD-SP4): 34 ML
BH CV ECHO MEAS - FS: 28.5 %
BH CV ECHO MEAS - IVS/LVPW: 0.92 CM
BH CV ECHO MEAS - IVSD: 1.11 CM
BH CV ECHO MEAS - LAT PEAK E' VEL: 12.3 CM/SEC
BH CV ECHO MEAS - LV DIASTOLIC VOL/BSA (35-75): 36.3 CM2
BH CV ECHO MEAS - LV MASS(C)D: 205.9 GRAMS
BH CV ECHO MEAS - LV MAX PG: 5.7 MMHG
BH CV ECHO MEAS - LV MEAN PG: 3.3 MMHG
BH CV ECHO MEAS - LV SYSTOLIC VOL/BSA (12-30): 16 CM2
BH CV ECHO MEAS - LV V1 MAX: 119.1 CM/SEC
BH CV ECHO MEAS - LV V1 VTI: 28 CM
BH CV ECHO MEAS - LVIDD: 4.8 CM
BH CV ECHO MEAS - LVIDS: 3.4 CM
BH CV ECHO MEAS - LVOT AREA: 3.3 CM2
BH CV ECHO MEAS - LVOT DIAM: 2.05 CM
BH CV ECHO MEAS - LVPWD: 1.21 CM
BH CV ECHO MEAS - MED PEAK E' VEL: 8.9 CM/SEC
BH CV ECHO MEAS - MR MAX PG: 93.9 MMHG
BH CV ECHO MEAS - MR MAX VEL: 484.6 CM/SEC
BH CV ECHO MEAS - MV DEC SLOPE: 461.2 CM/SEC2
BH CV ECHO MEAS - MV DEC TIME: 142 MSEC
BH CV ECHO MEAS - MV E MAX VEL: 62.2 CM/SEC
BH CV ECHO MEAS - MV MAX PG: 2.7 MMHG
BH CV ECHO MEAS - MV MEAN PG: 1 MMHG
BH CV ECHO MEAS - MV V2 VTI: 21.6 CM
BH CV ECHO MEAS - MVA(VTI): 4.3 CM2
BH CV ECHO MEAS - PA V2 MAX: 101.8 CM/SEC
BH CV ECHO MEAS - PI END-D VEL: 95 CM/SEC
BH CV ECHO MEAS - RAP SYSTOLE: 3 MMHG
BH CV ECHO MEAS - RV MAX PG: 1.22 MMHG
BH CV ECHO MEAS - RV V1 MAX: 55.3 CM/SEC
BH CV ECHO MEAS - RV V1 VTI: 10.9 CM
BH CV ECHO MEAS - RVOT DIAM: 2.06 CM
BH CV ECHO MEAS - RVSP: 16 MMHG
BH CV ECHO MEAS - SI(MOD-SP2): 26.9 ML/M2
BH CV ECHO MEAS - SI(MOD-SP4): 20.3 ML/M2
BH CV ECHO MEAS - SV(LVOT): 92.8 ML
BH CV ECHO MEAS - SV(MOD-SP2): 57 ML
BH CV ECHO MEAS - SV(MOD-SP4): 43 ML
BH CV ECHO MEAS - SV(RVOT): 36 ML
BH CV ECHO MEAS - TAPSE (>1.6): 2.5 CM
BH CV ECHO MEAS - TR MAX PG: 13.4 MMHG
BH CV ECHO MEAS - TR MAX VEL: 183.2 CM/SEC
BH CV ECHO MEASUREMENTS AVERAGE E/E' RATIO: 5.87
BH CV REST NUCLEAR ISOTOPE DOSE: 9.5 MCI
BH CV STRESS BP STAGE 1: NORMAL
BH CV STRESS COMMENTS STAGE 1: NORMAL
BH CV STRESS DOSE REGADENOSON STAGE 1: 0.4
BH CV STRESS DURATION MIN STAGE 1: 3
BH CV STRESS DURATION SEC STAGE 1: 59
BH CV STRESS HR STAGE 1: 90
BH CV STRESS NUCLEAR ISOTOPE DOSE: 28.2 MCI
BH CV STRESS PROTOCOL 1: NORMAL
BH CV STRESS RECOVERY BP: NORMAL MMHG
BH CV STRESS RECOVERY HR: 90 BPM
BH CV STRESS STAGE 1: 1
BH CV XLRA - TDI S': 8.2 CM/SEC
BUN SERPL-MCNC: 26 MG/DL (ref 8–23)
BUN/CREAT SERPL: 33.8 (ref 7–25)
CALCIUM SPEC-SCNC: 8.3 MG/DL (ref 8.6–10.5)
CHLORIDE SERPL-SCNC: 106 MMOL/L (ref 98–107)
CO2 SERPL-SCNC: 24.3 MMOL/L (ref 22–29)
CREAT SERPL-MCNC: 0.77 MG/DL (ref 0.76–1.27)
DEPRECATED RDW RBC AUTO: 45.7 FL (ref 37–54)
EGFRCR SERPLBLD CKD-EPI 2021: 90.5 ML/MIN/1.73
ERYTHROCYTE [DISTWIDTH] IN BLOOD BY AUTOMATED COUNT: 13.5 % (ref 12.3–15.4)
GLUCOSE SERPL-MCNC: 117 MG/DL (ref 65–99)
HCT VFR BLD AUTO: 41.2 % (ref 37.5–51)
HGB BLD-MCNC: 13.8 G/DL (ref 13–17.7)
LEFT ATRIUM VOLUME INDEX: 42.5 ML/M2
LV EF NUC BP: 60 %
MAXIMAL PREDICTED HEART RATE: 140 BPM
MAXIMAL PREDICTED HEART RATE: 140 BPM
MCH RBC QN AUTO: 31.4 PG (ref 26.6–33)
MCHC RBC AUTO-ENTMCNC: 33.5 G/DL (ref 31.5–35.7)
MCV RBC AUTO: 93.8 FL (ref 79–97)
PERCENT MAX PREDICTED HR: 80.71 %
PLATELET # BLD AUTO: 127 10*3/MM3 (ref 140–450)
PMV BLD AUTO: 10.2 FL (ref 6–12)
POTASSIUM SERPL-SCNC: 4.1 MMOL/L (ref 3.5–5.2)
QT INTERVAL: 370 MS
RBC # BLD AUTO: 4.39 10*6/MM3 (ref 4.14–5.8)
SINUS: 3.8 CM
SODIUM SERPL-SCNC: 139 MMOL/L (ref 136–145)
STRESS BASELINE BP: NORMAL MMHG
STRESS BASELINE HR: 67 BPM
STRESS PERCENT HR: 95 %
STRESS POST PEAK BP: NORMAL MMHG
STRESS POST PEAK HR: 113 BPM
STRESS TARGET HR: 119 BPM
STRESS TARGET HR: 119 BPM
TROPONIN T SERPL-MCNC: <0.01 NG/ML (ref 0–0.03)
TROPONIN T SERPL-MCNC: <0.01 NG/ML (ref 0–0.03)
WBC NRBC COR # BLD: 9.35 10*3/MM3 (ref 3.4–10.8)

## 2022-03-07 PROCEDURE — 25010000002 PERFLUTREN (DEFINITY) 8.476 MG IN SODIUM CHLORIDE (PF) 0.9 % 10 ML INJECTION: Performed by: PHYSICIAN ASSISTANT

## 2022-03-07 PROCEDURE — 78452 HT MUSCLE IMAGE SPECT MULT: CPT

## 2022-03-07 PROCEDURE — G0378 HOSPITAL OBSERVATION PER HR: HCPCS

## 2022-03-07 PROCEDURE — A9500 TC99M SESTAMIBI: HCPCS | Performed by: EMERGENCY MEDICINE

## 2022-03-07 PROCEDURE — 93306 TTE W/DOPPLER COMPLETE: CPT

## 2022-03-07 PROCEDURE — 25010000002 REGADENOSON 0.4 MG/5ML SOLUTION: Performed by: EMERGENCY MEDICINE

## 2022-03-07 PROCEDURE — 93016 CV STRESS TEST SUPVJ ONLY: CPT | Performed by: INTERNAL MEDICINE

## 2022-03-07 PROCEDURE — 93306 TTE W/DOPPLER COMPLETE: CPT | Performed by: INTERNAL MEDICINE

## 2022-03-07 PROCEDURE — 99214 OFFICE O/P EST MOD 30 MIN: CPT | Performed by: INTERNAL MEDICINE

## 2022-03-07 PROCEDURE — 0 TECHNETIUM SESTAMIBI: Performed by: EMERGENCY MEDICINE

## 2022-03-07 PROCEDURE — 78452 HT MUSCLE IMAGE SPECT MULT: CPT | Performed by: INTERNAL MEDICINE

## 2022-03-07 PROCEDURE — 93017 CV STRESS TEST TRACING ONLY: CPT

## 2022-03-07 PROCEDURE — 85027 COMPLETE CBC AUTOMATED: CPT | Performed by: PHYSICIAN ASSISTANT

## 2022-03-07 PROCEDURE — 84484 ASSAY OF TROPONIN QUANT: CPT | Performed by: PHYSICIAN ASSISTANT

## 2022-03-07 PROCEDURE — 93018 CV STRESS TEST I&R ONLY: CPT | Performed by: INTERNAL MEDICINE

## 2022-03-07 PROCEDURE — 80048 BASIC METABOLIC PNL TOTAL CA: CPT | Performed by: PHYSICIAN ASSISTANT

## 2022-03-07 RX ADMIN — Medication 10 ML: at 08:58

## 2022-03-07 RX ADMIN — REGADENOSON 0.4 MG: 0.08 INJECTION, SOLUTION INTRAVENOUS at 12:19

## 2022-03-07 RX ADMIN — LISINOPRIL 30 MG: 20 TABLET ORAL at 08:54

## 2022-03-07 RX ADMIN — PANTOPRAZOLE SODIUM 40 MG: 40 TABLET, DELAYED RELEASE ORAL at 08:57

## 2022-03-07 RX ADMIN — ROSUVASTATIN CALCIUM 10 MG: 20 TABLET, FILM COATED ORAL at 00:18

## 2022-03-07 RX ADMIN — PERFLUTREN 2 ML: 6.52 INJECTION, SUSPENSION INTRAVENOUS at 07:59

## 2022-03-07 RX ADMIN — TECHNETIUM TC 99M SESTAMIBI 1 DOSE: 1 INJECTION INTRAVENOUS at 12:19

## 2022-03-07 RX ADMIN — TECHNETIUM TC 99M SESTAMIBI 1 DOSE: 1 INJECTION INTRAVENOUS at 09:53

## 2022-03-07 RX ADMIN — Medication 10 ML: at 00:19

## 2022-03-07 NOTE — PROGRESS NOTES
ED OBSERVATION PROGRESS/DISCHARGE SUMMARY    Date of Admission: 3/6/2022   LOS: 0 days   PCP: Luiz Nicolas MD    Final Diagnosis chest pain    Patient seen at: 9:30 AM    Subjective   Patient reports he has had no further episodes of chest pain.  Denies shortness of breath and palpitations.  He does endorse some mild lightheadedness and nausea.  Denies abdominal pain, vomiting, diarrhea.  Denies fevers and chills.  He states the chest pain that he was having last night felt like a chest discomfort in the center of his chest, felt like something was pressing down.     Hospital Outcome:   81-year-old male with a history of proximal atrial fibrillation on Xarelto, COPD, diabetes, hypertension, hyperlipidemia, peripheral neuropathy, history of stroke, and prostate cancer who presented to [facility] ER with chest discomfort that awoken him from sleep.  ER evaluation significant for initial troponin negative and EKG without signs of ischemia.  Chest x-ray showed chronic signs of COPD without any acute findings.  Patient was admitted to the observation unit for further monitoring and cardiology consult.  Patient was seen and evaluated by cardiology who ordered for patient to undergo stress testing that resulted indicating a normal myocardial perfusion study with no evidence of ischemia.  Cardiology recommended patient to continue follow-up in their outpatient office.  I spoke with the patient regarding this plan and he is in agreement.    ROS:  General: no fevers, chills  Respiratory: no cough, dyspnea  Cardiovascular: no chest pain, palpitations  Abdomen: + nausea, No abdominal pain, vomiting, or diarrhea  Neurologic: No focal weakness    Objective   Physical Exam:  I have reviewed the vital signs.  Temp:  [98.1 °F (36.7 °C)-98.4 °F (36.9 °C)] 98.2 °F (36.8 °C)  Heart Rate:  [62-80] 64  Resp:  [16-18] 16  BP: (109-153)/(67-85) 136/68  General Appearance:  81-year-old male, well-nourished, no acute  distress on room air  Head:    Normocephalic, atraumatic  Eyes:    Sclerae anicteric  Neck:   Supple, no mass  Lungs: Clear to auscultation bilaterally, respirations unlabored, no chest wall tenderness  Heart: Regular rate and rhythm, S1 and S2 normal, no murmur, rub or gallop  Abdomen:  Soft, non-tender, bowel sounds active, nondistended  Extremities: No clubbing, cyanosis, or edema to lower extremities  Pulses:  2+ and symmetric in distal lower extremities  Skin: Warm and dry, no rashes   Neurologic: Alert and oriented x3, Normal strength to extremities    Results Review:    I have reviewed the labs, radiology results and diagnostic studies.    Results from last 7 days   Lab Units 03/07/22  0522   WBC 10*3/mm3 9.35   HEMOGLOBIN g/dL 13.8   HEMATOCRIT % 41.2   PLATELETS 10*3/mm3 127*     Results from last 7 days   Lab Units 03/07/22 0522 03/06/22  2128   SODIUM mmol/L 139 138   POTASSIUM mmol/L 4.1 4.4   CHLORIDE mmol/L 106 104   CO2 mmol/L 24.3 26.0   BUN mg/dL 26* 30*   CREATININE mg/dL 0.77 0.88   CALCIUM mg/dL 8.3* 8.8   BILIRUBIN mg/dL  --  0.4   ALK PHOS U/L  --  41   ALT (SGPT) U/L  --  33   AST (SGOT) U/L  --  16   GLUCOSE mg/dL 117* 101*     Imaging Results (Last 24 Hours)     Procedure Component Value Units Date/Time    XR Chest 1 View [[id number]] Collected: 03/06/22 2105     Updated: 03/06/22 2109    Narrative:      SINGLE VIEW OF THE CHEST     HISTORY: Chest pain     COMPARISON: 09/13/2021     FINDINGS:  Cardiomegaly is present. There are background changes of COPD. No  pneumothorax or pleural effusion is seen. Calcified granulomata are  noted within the lungs. No definite acute infiltrates are seen.       Impression:      Background changes of COPD. No definite acute findings.     This report was finalized on 3/6/2022 9:06 PM by Dr. Kandi Drew M.D.             I have reviewed the  medications.  ---------------------------------------------------------------------------------------------  Assessment/Plan   Assessment/Problem List    Chest pain      Plan:    Chest pain  -Troponin negative x3  -EKG nonischemic  -Chest x-ray no acute findings  -Echocardiogram revealed normal R VSP, EF 60% with normal LV diastolic function and mildly dilated right atrial cavity.    -Stress test negative  --Patient seen and evaluated by NELSON Pedersen with cardiology, commended patient to continue follow-up with Dr. Rosado on March 24 at 10 AM  -Patient had a cardiac catheterization in August 2021 that showed moderate CAD with mild LV dysfunction to be treated medically.    Proximal atrial fibrillation  -Continue Xarelto at discharge  -Rate controlled on exam    Hypertension  -Continue home medications at discharge t    Diabetes  -Continue home medications at discharge  -Continue regular follow-up with primary care provider at discharge      Disposition: Home    Follow-up after Discharge: Cardiology, PCP    This note will serve as discharge summary.    I wore an face mask, eye protection, and gloves during this patient encounter. Patient also wearing a surgical mask. Hand hygeine performed before and after seeing the patient.      Terri Valdovinos PA-C 03/07/22 09:41 EST

## 2022-03-07 NOTE — PROGRESS NOTES
"[facility]  Cardiology Progress Note    Patient Identification:  Name: Doc Coyne  Age: 81 y.o.  Sex: male  :  1940  MRN: [id number]                   Stress test negative. Ok to discharge from a cardiovascular standpoint. He will follow up with Dr Rosado on  at 10am      )3/7/2022  NELSON Pughon/Transcription:   \"Dictated utilizing Dragon dictation\".     "

## 2022-03-07 NOTE — ED PROVIDER NOTES
EMERGENCY DEPARTMENT ENCOUNTER    Room Number:  114/1  Date of encounter:  3/7/2022  PCP: Luiz Nicolas MD  Historian: Patient      HPI:  Chief Complaint: Chest pain  A complete HPI/ROS/PMH/PSH/SH/FH are unobtainable due to: None    Context: Doc Coyne is a 81 y.o. male who presents to the ED c/o episode of sharp substernal chest pain that started earlier today when he was walking home from his car.  He had gone to the pharmacy to get a prescription for his nitroglycerin and they said he did not have a refill which upset him.  He said the pain lasted about 30 minutes until EMS got there and gave him one of their nitroglycerin.  Currently he is pain-free.    The pain did not radiate, he was not short of breath, nauseated or any other associated symptoms.  This is consistent with the anginal symptoms he had in the past.  From review of the records in [facility], the patient had a recent cardiac cath which showed a 50% stenosis in one of the marginal branches off the LAD which is being managed medically.  He is set for a stress test with Dr. Wilkinson later this week.  This was due to an increasing pattern of pain      PAST MEDICAL HISTORY  Active Ambulatory Problems     Diagnosis Date Noted   • Atrial fibrillation ([facility]) 02/08/2016   • Chest pain, atypical 02/08/2016   • Shortness of breath 02/08/2016   • Preop cardiovascular exam 02/08/2016   • Anticoagulated 05/14/2018   • Atherosclerotic heart disease of native coronary artery without angina pectoris 12/24/2019   • Hypertension 12/24/2019   • Benign prostatic hyperplasia 12/24/2019   • COPD (chronic obstructive pulmonary disease) ([facility]) 12/24/2019   • Type 2 diabetes mellitus, without long-term current use of insulin ([facility]) 12/24/2019   • Bradycardia 12/24/2019   • Chest pain 12/24/2019   • TIA (transient ischemic attack) 02/27/2020   • Prostate CA ([facility]) 02/27/2020   • Visual changes 09/21/2020   • Atherosclerosis of native coronary artery of native heart  without angina pectoris 2021   • Essential hypertension 2021   • Paroxysmal atrial fibrillation ([facility]) 2021     Resolved Ambulatory Problems     Diagnosis Date Noted   • No Resolved Ambulatory Problems     Past Medical History:   Diagnosis Date   • Benign prostatic hypertrophy    • Colon polyp    • Diabetes mellitus ([facility]CC)    • Difficulty walking    • Hyperlipidemia    • Peripheral neuropathy    • Prostate cancer (HCC)    • Stroke (HCC)          PAST SURGICAL HISTORY  Past Surgical History:   Procedure Laterality Date   • CARDIAC CATHETERIZATION     • CARDIAC CATHETERIZATION N/A 2021    Procedure: Left Heart Cath;  Surgeon: Joel Rosado MD;  Location:  [facility];  Service: Cardiology;  Laterality: N/A;   • CARDIAC CATHETERIZATION N/A 2021    Procedure: Coronary angiography;  Surgeon: Joel Rosado MD;  Location:  [facility];  Service: Cardiology;  Laterality: N/A;   • CARDIAC CATHETERIZATION N/A 2021    Procedure: Left ventriculography;  Surgeon: Joel Rosado MD;  Location:  [facility];  Service: Cardiology;  Laterality: N/A;   • COLONOSCOPY     • HEMORRHOIDECTOMY     • PROSTATE SURGERY           FAMILY HISTORY  Family History   Problem Relation Age of Onset   • Hypertension Father    • Heart failure Father    • Hyperlipidemia Father    • Heart disease Father    • Heart attack Father    • Heart attack Sister    • Stroke Mother    • Heart disease Mother    • Diabetes Mother    • Kidney disease Maternal Grandmother          SOCIAL HISTORY  Social History     Socioeconomic History   • Marital status:    Tobacco Use   • Smoking status: Former Smoker     Packs/day: 0.50     Types: Cigarettes     Quit date: 2020     Years since quittin.5   • Smokeless tobacco: Never Used   • Tobacco comment: QUIT 2 MONTHS AGO   Vaping Use   • Vaping Use: Never used   Substance and Sexual Activity   • Alcohol  use: Yes     Comment: OCCASIONALLY/ wine   • Drug use: No   • Sexual activity: Defer         ALLERGIES  Isosorbide nitrate, Novocain  [procaine], Penicillins, Propoxyphene, Cephalexin, Doxycycline, and Sulfa antibiotics        REVIEW OF SYSTEMS  Review of Systems     All systems reviewed and negative except for those discussed in HPI.       PHYSICAL EXAM    I have reviewed the triage vital signs and nursing notes.    ED Triage Vitals [03/06/22 2041]   Temp Heart Rate Resp BP SpO2   98.4 °F (36.9 °C) 80 17 153/85 98 %      Temp src Heart Rate Source Patient Position BP Location FiO2 (%)   -- -- -- -- --       Physical Exam  GENERAL: not distressed  HENT: nares patent  EYES: no scleral icterus  CV: Rate controlled A. fib  RESPIRATORY: normal effort  ABDOMEN: soft  MUSCULOSKELETAL: no deformity  NEURO: alert, moves all extremities, follows commands  SKIN: warm, dry        LAB RESULTS  Recent Results (from the past 24 hour(s))   ECG 12 Lead    Collection Time: 03/06/22  9:21 PM   Result Value Ref Range    QT Interval 370 ms   Comprehensive Metabolic Panel    Collection Time: 03/06/22  9:28 PM    Specimen: Blood   Result Value Ref Range    Glucose 101 (H) 65 - 99 mg/dL    BUN 30 (H) 8 - 23 mg/dL    Creatinine 0.88 0.76 - 1.27 mg/dL    Sodium 138 136 - 145 mmol/L    Potassium 4.4 3.5 - 5.2 mmol/L    Chloride 104 98 - 107 mmol/L    CO2 26.0 22.0 - 29.0 mmol/L    Calcium 8.8 8.6 - 10.5 mg/dL    Total Protein 6.4 6.0 - 8.5 g/dL    Albumin 4.10 3.50 - 5.20 g/dL    ALT (SGPT) 33 1 - 41 U/L    AST (SGOT) 16 1 - 40 U/L    Alkaline Phosphatase 41 39 - 117 U/L    Total Bilirubin 0.4 0.0 - 1.2 mg/dL    Globulin 2.3 gm/dL    A/G Ratio 1.8 g/dL    BUN/Creatinine Ratio 34.1 (H) 7.0 - 25.0    Anion Gap 8.0 5.0 - 15.0 mmol/L    eGFR 86.9 >60.0 mL/min/1.73   Troponin    Collection Time: 03/06/22  9:28 PM    Specimen: Blood   Result Value Ref Range    Troponin T <0.010 0.000 - 0.030 ng/mL   BNP    Collection Time: 03/06/22  9:28 PM     Specimen: Blood   Result Value Ref Range    proBNP 503.0 0.0 - 1,800.0 pg/mL   CBC Auto Differential    Collection Time: 03/06/22  9:28 PM    Specimen: Blood   Result Value Ref Range    WBC 10.30 3.40 - 10.80 10*3/mm3    RBC 4.43 4.14 - 5.80 10*6/mm3    Hemoglobin 14.0 13.0 - 17.7 g/dL    Hematocrit 40.5 37.5 - 51.0 %    MCV 91.4 79.0 - 97.0 fL    MCH 31.6 26.6 - 33.0 pg    MCHC 34.6 31.5 - 35.7 g/dL    RDW 13.5 12.3 - 15.4 %    RDW-SD 45.8 37.0 - 54.0 fl    MPV 10.0 6.0 - 12.0 fL    Platelets 127 (L) 140 - 450 10*3/mm3   Manual Differential    Collection Time: 03/06/22  9:28 PM    Specimen: Blood   Result Value Ref Range    Neutrophil % 37.0 (L) 42.7 - 76.0 %    Lymphocyte % 55.0 (H) 19.6 - 45.3 %    Monocyte % 4.0 (L) 5.0 - 12.0 %    Eosinophil % 1.0 0.3 - 6.2 %    Atypical Lymphocyte % 3.0 0.0 - 5.0 %    Neutrophils Absolute 3.81 1.70 - 7.00 10*3/mm3    Lymphocytes Absolute 5.97 (H) 0.70 - 3.10 10*3/mm3    Monocytes Absolute 0.41 0.10 - 0.90 10*3/mm3    Eosinophils Absolute 0.10 0.00 - 0.40 10*3/mm3    RBC Morphology Normal Normal    WBC Morphology Normal Normal    Platelet Morphology Normal Normal   Lipid Panel    Collection Time: 03/06/22  9:28 PM    Specimen: Blood   Result Value Ref Range    Total Cholesterol 121 0 - 200 mg/dL    Triglycerides 38 0 - 150 mg/dL    HDL Cholesterol 52 40 - 60 mg/dL    LDL Cholesterol  59 0 - 100 mg/dL    VLDL Cholesterol 10 5 - 40 mg/dL    LDL/HDL Ratio 1.18    COVID-19, [facility] IN-HOUSE CEPHEID/FARA NP SWAB IN TRANSPORT MEDIA 8-12 HR TAT - Swab, Nasopharynx    Collection Time: 03/06/22  9:29 PM    Specimen: Nasopharynx; Swab   Result Value Ref Range    COVID19 Not Detected Not Detected - Ref. Range   Troponin    Collection Time: 03/07/22 12:09 AM    Specimen: Blood   Result Value Ref Range    Troponin T <0.010 0.000 - 0.030 ng/mL       Ordered the above labs and independently reviewed the results.        RADIOLOGY  XR Chest 1 View    Result Date: 3/6/2022  SINGLE VIEW OF THE  CHEST  HISTORY: Chest pain  COMPARISON: 09/13/2021  FINDINGS: Cardiomegaly is present. There are background changes of COPD. No pneumothorax or pleural effusion is seen. Calcified granulomata are noted within the lungs. No definite acute infiltrates are seen.      Background changes of COPD. No definite acute findings.  This report was finalized on 3/6/2022 9:06 PM by Dr. Kandi Drew M.D.        I ordered the above noted radiological studies. Reviewed by me and discussed with radiologist.  See dictation for official radiology interpretation.      PROCEDURES    Procedures      MEDICATIONS GIVEN IN ER    Medications   sodium chloride 0.9 % flush 10 mL (has no administration in time range)   nitroglycerin (NITROSTAT) SL tablet 0.4 mg (has no administration in time range)   sodium chloride 0.9 % flush 10 mL (10 mL Intravenous Given 3/7/22 0019)   sodium chloride 0.9 % flush 10 mL (has no administration in time range)   ondansetron (ZOFRAN) tablet 4 mg (has no administration in time range)     Or   ondansetron (ZOFRAN) injection 4 mg (has no administration in time range)   acetaminophen (TYLENOL) tablet 650 mg (has no administration in time range)     Or   acetaminophen (TYLENOL) 160 MG/5ML solution 650 mg (has no administration in time range)     Or   acetaminophen (TYLENOL) suppository 650 mg (has no administration in time range)   melatonin tablet 5 mg (has no administration in time range)   lisinopril (PRINIVIL,ZESTRIL) tablet 30 mg (has no administration in time range)   pantoprazole (PROTONIX) EC tablet 40 mg (has no administration in time range)   rosuvastatin (CRESTOR) tablet 10 mg (10 mg Oral Given 3/7/22 0018)   dextrose (GLUTOSE) oral gel 15 g (has no administration in time range)   dextrose (D50W) (25 g/50 mL) IV injection 25 g (has no administration in time range)   glucagon (human recombinant) (GLUCAGEN DIAGNOSTIC) injection 1 mg (has no administration in time range)   insulin lispro (ADMELOG)  injection 0-7 Units (has no administration in time range)         PROGRESS, DATA ANALYSIS, CONSULTS, AND MEDICAL DECISION MAKING    All labs have been independently reviewed by me.  All radiology studies have been reviewed by me and discussed with radiologist dictating the report.   EKG's independently viewed and interpreted by me.  Discussion below represents my analysis of pertinent findings related to patient's condition, differential diagnosis, treatment plan and final disposition.        ED Course as of 03/07/22 0054   Mon Mar 07, 2022   0053 CBC unremarkable, chemistry shows a BUN of 30 otherwise normal [DP]   0053 Troponin negative [DP]   0053 proBNP normal [DP]   0053 Chest x-ray negative [DP]   0053 EKG on arrival showed atrial fib with a rate in the 70s  Normal QRS and QT  No acute ST segment changes to suggest ischemia, and this is unchanged compared to most recent previous dated March 1, 2022 [DP]   0054 Patient remained symptom-free while here, however his recent change in anginal pattern along with tonight's episode raises enough concern that he will be admitted to the observation unit for further testing and cardiology consult. [DP]   0054 Patient has a heart score 7 [DP]      ED Course User Index  [DP] Frank Gonzalez MD           PPE: The patient wore a surgical mask throughout the entire patient encounter. I wore an N95.    AS OF 00:54 EST VITALS:    BP - 137/73  HR - 67  TEMP - 98.1 °F (36.7 °C)  O2 SATS - 99%        DIAGNOSIS  Final diagnoses:   Chest pain in adult         DISPOSITION  Admit to observation unit           Frank Gonzalez MD  03/07/22 0054

## 2022-03-07 NOTE — PLAN OF CARE
Goal Outcome Evaluation:  Plan of Care Reviewed With: patient           Outcome Evaluation: patient being discharged home with cardio follow up on 3/24/2022 @ 1000 w/ Dr Rosado. Patient received dc instructions, verbalized understanding and denied having questions. PAtient waiting on a cab for transport home.

## 2022-03-07 NOTE — H&P
[facility]   HISTORY AND PHYSICAL    Patient Name: Doc Coyne  : 1940  MRN: [id number]  Primary Care Physician:  Luiz Nicolas MD  Date of admission: 3/6/2022    Subjective   Subjective     Chief Complaint:   Chief Complaint   Patient presents with   • Chest Pain         HPI:    Doc Coyne is a 81 y.o. male with diabetes, COPD, hypertension, hyperlipidemia, CAD, atrial fibrillation, and previous CVA without residual deficits, who presented to the emergency department complaining of chest pain.  Patient was at eye doctor on 22 for cataract surgery when he developed chest tightness and soreness.  His cataract surgery was canceled.  He had been off Xarelto for 3 days prior to scheduled surgery.  Patient then proceeded to his cardiologist office on 2022 and plan for stress test and echo this week.  However, chest pain occurred while he was driving home from grocery store and he states it was severe and he is out of his nitroglycerin so he proceeded to emergency department.  Patient denies shortness of breath, nausea, vomiting, diarrhea, diaphoresis, fever, chills, recent sick contacts.    Initial troponin negative and ECG without signs of ischemia.  Patient had cardiac cath in 2021, details below.    Review of Systems   All systems were reviewed and negative except for: What is discussed in the HPI    Personal History     Past Medical History:   Diagnosis Date   • Atherosclerotic heart disease of native coronary artery without angina pectoris    • Atrial fibrillation (HCC)    • Benign prostatic hypertrophy    • Chest pain    • Colon polyp    • COPD (chronic obstructive pulmonary disease) (HCC)    • Diabetes mellitus (HCC)    • Difficulty walking    • Hyperlipidemia    • Hypertension    • Peripheral neuropathy    • Prostate cancer (HCC)    • Stroke (HCC)        Past Surgical History:   Procedure Laterality Date   • CARDIAC CATHETERIZATION     • CARDIAC CATHETERIZATION  N/A 8/25/2021    Procedure: Left Heart Cath;  Surgeon: Joel Rosado MD;  Location:  [facility];  Service: Cardiology;  Laterality: N/A;   • CARDIAC CATHETERIZATION N/A 8/25/2021    Procedure: Coronary angiography;  Surgeon: Joel Rosado MD;  Location:  [facility];  Service: Cardiology;  Laterality: N/A;   • CARDIAC CATHETERIZATION N/A 8/25/2021    Procedure: Left ventriculography;  Surgeon: Joel Rosado MD;  Location:  [facility];  Service: Cardiology;  Laterality: N/A;   • COLONOSCOPY     • HEMORRHOIDECTOMY     • PROSTATE SURGERY         Family History: family history includes Diabetes in his mother; Heart attack in his father and sister; Heart disease in his father and mother; Heart failure in his father; Hyperlipidemia in his father; Hypertension in his father; Kidney disease in his maternal grandmother; Stroke in his mother. Otherwise pertinent FHx was reviewed and not pertinent to current issue.    Social History:  reports that he quit smoking about 18 months ago. His smoking use included cigarettes. He smoked 0.50 packs per day. He has never used smokeless tobacco. He reports current alcohol use. He reports that he does not use drugs.    Home Medications:  dilTIAZem CD, lisinopril, loratadine, metFORMIN, metoprolol tartrate, nitroglycerin, pantoprazole, rivaroxaban, and rosuvastatin    Allergies:  Allergies   Allergen Reactions   • Isosorbide Nitrate    • Novocain  [Procaine]    • Penicillins    • Propoxyphene    • Cephalexin Rash   • Doxycycline Rash   • Sulfa Antibiotics Rash       Objective   Objective     Vitals:   Temp:  [98.1 °F (36.7 °C)-98.4 °F (36.9 °C)] 98.1 °F (36.7 °C)  Heart Rate:  [67-80] 67  Resp:  [17-18] 18  BP: (109-153)/(67-85) 137/73  Physical Exam     GENERAL: 81 y.o. YO male a/o x 4, NAD  SKIN: Warm pink and dry   HEENT:  PERRLA, EOM intact, conjunctiva normal, sclera clear  NECK: supple, no JVD  LUNGS:  Clear to auscultation bilaterally without wheezes, rales or rhonchi.  No accessory muscle use and no nasal flaring.  CARDIAC:  Irregular rhythm, regular rate.  No murmurs, rubs or gallops.  No peripheral edema.  Equal pulses bilaterally.  ABDOMEN: Soft, nontender, nondistended.  No guarding or rebound tenderness.  Normal bowel sounds.  MUSCULOSKELETAL: Moves all extremities well.  No deformity.  NEURO: Cranial nerves II through XII grossly intact.  No gross focal deficits.  Alert.  Normal speech and motor.  PSYCH: Normal mood and affect      Result Review    Result Review:  I have personally reviewed the results from the time of this admission to 3/6/2022 23:42 EST and agree with these findings:  [x]  Laboratory  []  Microbiology  [x]  Radiology  [x]  EKG/Telemetry   []  Cardiology/Vascular   []  Pathology  []  Old records  []  Other:  Most notable findings include: troponin <0.010, EKG shows atrial fibrillation with rate of 70 without any signs of ischemia, no ST elevation or depression, no T wave abnormalities    Prior record review    Cardiac cath 8/25/2021   Conclusion       · Successful right and left coronary angiogram and LV gram  · Normal left main  · Left anterior descending early atherosclerotic plaque with the first and the second diagonal branch at the ostial 50% stenosis minimum irregularity of the LAD diagonal and  branches otherwise  · Circumflex artery was a nondominant minimum diffuse irregularity  · Right coronary artery dominant with a minimum diffuse irregularity  · Mild LV dysfunction EF of 45%      Stress test 11/16/2020   Interpretation Summary       · Findings consistent with a normal ECG stress test.  · Myocardial perfusion imaging indicates a normal myocardial perfusion study with no evidence of ischemia.  · Impressions are consistent with a low risk study.  · Thin apex normal variant      Echo 9/22/2020   Interpretation Summary    · Calculated EF = 45% Estimated EF was in  agreement with the calculated EF. Ejection fraction appears to be 41 - 45%.  · Peak velocity of the flow distal to the aortic valve is 144.6 cm/s. Aortic valve maximum pressure gradient is 8.4 mmHg. Aortic valve mean pressure gradient is 5.1 mmHg. Aortic valve dimensionless index is 0.5.  · Mild tricuspid valve regurgitation is present. Calculated right ventricular systolic pressure from tricuspid regurgitation is 0 mmHg.  · Mild mitral valve regurgitation is present.  · Left atrial cavity size is moderately dilated.  · Mild aortic valve regurgitation is present.  · Right ventricular cavity is mildly dilated.  · The left ventricular cavity is mildly dilated.  · There is no evidence of pericardial effusion.      Assessment/Plan   Assessment / Plan     Brief Patient Summary:  Doc Coyne is a 81 y.o. male who is being admitted to observation unit for further evaluation of chest pain    Active Hospital Problems:  Active Hospital Problems    Diagnosis    • Chest pain      Plan:     1. Chest pain  -troponin <0.010x1, trend  -consult cardiology  -will order echo as this has not been done since cardiology office visit on 2/22  -Hold metoprolol and diltiazem and anticipation of stress test  -Hold Xarelto in anticipation of possible cardiac cath    2. Chronic atrial fibrillation, rate controlled    -Xarelto being held in the event of cardiac cath  -monitor     3. HTN  -continue lisinopril  -hold metoprolol and diltiazem for stress test    4. Diabetes  -hold metformin   -insulin sliding scale   -POC glucose checks    DVT prophylaxis:  Mechanical DVT prophylaxis orders are present.    CODE STATUS:    Level Of Support Discussed With: Patient  Code Status (Patient has no pulse and is not breathing): CPR (Attempt to Resuscitate)  Medical Interventions (Patient has pulse or is breathing): Full Support    Admission Status:  I believe this patient meets observation status.    I wore an N95 mask, face shield, and gloves during  this patient encounter. Patient also wearing a surgical mask throughout encounter. Hand hygeine performed before and after seeing the patient.    Electronically signed by TORRES Pisano, 03/06/22, 11:42 PM EST.

## 2022-03-07 NOTE — DISCHARGE INSTRUCTIONS
Continue home medications as prescribed.  Follow-up with cardiology on March 24 at 10 AM.  We will up with your primary care provider in 2 to 3 weeks.    Return to the emergency department with worsening symptoms, uncontrolled pain, inability to tolerate oral liquids, fever greater than 101°F not controlled by Tylenol or as needed with emergent concerns.

## 2022-03-07 NOTE — EXTERNAL PATIENT INSTRUCTIONS
Patient Education   Table of Contents       Nonspecific Chest Pain     To view videos and all your education online visit,   [email or web address]   or scan this QR code with your smartphone.                  Nonspecific Chest Pain     Chest pain can be caused by many different conditions. Some causes of chest pain can be life-threatening. These will require treatment right away. Serious causes of chest pain include:       Heart attack.       A tear in the body's main blood vessel.       Redness and swelling (inflammation) around your heart.       Blood clot in your lungs.      Other causes of chest pain may not be so serious. These include:       Heartburn.       Anxiety or stress.       Damage to bones or muscles in your chest.       Lung infections.      Chest pain can feel like:       Pain or discomfort in your chest.       Crushing, pressure, aching, or squeezing pain.       Burning or tingling.       Dull or sharp pain that is worse when you move, cough, or take a deep breath.       Pain or discomfort that is also felt in your back, neck, jaw, shoulder, or arm, or pain that spreads to any of these areas.     It is hard to know whether your pain is caused by something that is serious or something that is not so serious. So it is important to see your doctor right away if you have chest pain.   Follow these instructions at home:   Medicines         Take over-the-counter and prescription medicines only as told by your doctor.       If you were prescribed an antibiotic medicine, take it as told by your doctor. Do not  stop taking the antibiotic even if you start to feel better.     Lifestyle            Rest as told by your doctor.      Do not  use any products that contain nicotine or tobacco, such as cigarettes, e-cigarettes, and chewing tobacco. If you need help quitting, ask your doctor.      Do not  drink alcohol.      Make lifestyle changes as told by your doctor. These may include:       Getting  regular exercise. Ask your doctor what activities are safe for you.       Eating a heart-healthy diet. A diet and nutrition specialist (dietitian) can help you to learn healthy eating options.       Staying at a healthy weight.       Treating diabetes or high blood pressure, if needed.       Lowering your stress. Activities such as yoga and relaxation techniques can help.     General instructions         Pay attention to any changes in your symptoms. Tell your doctor about them or any new symptoms.       Avoid any activities that cause chest pain.       Keep all follow-up visits as told by your doctor. This is important. You may need more testing if your chest pain does not go away.       Contact a doctor if:         Your chest pain does not go away.       You feel depressed.       You have a fever.     Get help right away if:         Your chest pain is worse.       You have a cough that gets worse, or you cough up blood.       You have very bad (severe) pain in your belly (abdomen).       You pass out (faint).      You have either of these for no clear reason:       Sudden chest discomfort.       Sudden discomfort in your arms, back, neck, or jaw.       You have shortness of breath at any time.       You suddenly start to sweat, or your skin gets clammy.       You feel sick to your stomach (nauseous).       You throw up (vomit).       You suddenly feel lightheaded or dizzy.       You feel very weak or tired.       Your heart starts to beat fast, or it feels like it is skipping beats.     These symptoms may be an emergency. Do not wait to see if the symptoms will go away. Get medical help right away. Call your local emergency services (911 in the [place] Do not drive yourself to the hospital.   Summary         Chest pain can be caused by many different conditions. The cause may be serious and need treatment right away. If you have chest pain, see your doctor right away.       Follow your doctor's instructions for  taking medicines and making lifestyle changes.       Keep all follow-up visits as told by your doctor. This includes visits for any further testing if your chest pain does not go away.       Be sure to know the signs that show that your condition has become worse. Get help right away if you have these symptoms.     This information is not intended to replace advice given to you by your health care provider. Make sure you discuss any questions you have with your health care provider.     Document Released: 06/05/2009Document Revised: 06/20/2019Document Reviewed: 06/20/2019     [employer] Patient Education ? 2021 [employer]

## 2022-03-07 NOTE — CONSULTS
[facility]  Cardiology Consult Note    Patient Identification:  Name: Doc Coyne  Age: 81 y.o.  Sex: male  :  1940  MRN: [id number]             Requesting Physician: Iraida MACDONALD    Reason for Consultation / Chief Complaint: chest pain    History of Present Illness:     This is an 81-year-old male known to our service.  He has a history to include paroxysmal atrial fibrillation anticoagulated on Xarelto, COPD, diabetes, hypertension, hyperlipidemia, peripheral neuropathy, history of stroke, prostate cancer.  He presented to [facility] ER with complaints of chest discomfort.  He reports he awoke from sleep with chest aching mid-sternal. He reports he was out of nitroglycerine so he did not take any. No associated symptoms. No shortness of breath or diaphoresis with event. Aching lasted < 5min resolved on its own.  He was seen in office on 2022 with symptoms similar.  He was scheduled for outpatient echo and stress test. Cardiac catheterization 2021 normal left main, LAD early atherosclerotic plaque with first and second diagonal branch at the ostial 50% stenosis, minimum irregularity of the LAD diagonal  branches, circumflex was nondominant with minimal diffuse irregularity, RCA dominant with minimal diffuse irregularity, mild LV dysfunction EF 45%.  Echo 2020 EF 45%, mild TV, MV, AV regurg.  Mildly dilated RV C, mildly dilated LV C, four-port as below.  Chest x-ray in ER unremarkable for acute process, troponin negative x3.  CBC unremarkable, chemistry panel unremarkable except slightly elevated BUN, creatinine normal 0.77.    Comorbid cardiac risk factors: CAD, DM, Hyperlipidemia, Hypertension    Past Medical History:  Past Medical History:   Diagnosis Date    Atherosclerotic heart disease of native coronary artery without angina pectoris     Atrial fibrillation (HCC)     Benign prostatic hypertrophy     Chest pain     Colon polyp     COPD  (chronic obstructive pulmonary disease) (HCC)     Diabetes mellitus (HCC)     Difficulty walking     Hyperlipidemia     Hypertension     Peripheral neuropathy     Prostate cancer (HCC)     Stroke (HCC)      Past Surgical History:  Past Surgical History:   Procedure Laterality Date    CARDIAC CATHETERIZATION      CARDIAC CATHETERIZATION N/A 8/25/2021    Procedure: Left Heart Cath;  Surgeon: Joel Rosado MD;  Location:  ECHO CATH INVASIVE LOCATION;  Service: Cardiology;  Laterality: N/A;    CARDIAC CATHETERIZATION N/A 8/25/2021    Procedure: Coronary angiography;  Surgeon: Joel Rosado MD;  Location:  ECHO CATH INVASIVE LOCATION;  Service: Cardiology;  Laterality: N/A;    CARDIAC CATHETERIZATION N/A 8/25/2021    Procedure: Left ventriculography;  Surgeon: Joel Rosado MD;  [facility];  Service: Cardiology;  Laterality: N/A;    COLONOSCOPY      HEMORRHOIDECTOMY      PROSTATE SURGERY        Allergies:  Allergies   Allergen Reactions    Isosorbide Nitrate     Novocain  [Procaine]     Penicillins     Propoxyphene     Cephalexin Rash    Doxycycline Rash    Sulfa Antibiotics Rash     Home Meds:  Medications Prior to Admission   Medication Sig Dispense Refill Last Dose    dilTIAZem CD (CARDIZEM CD) 120 MG 24 hr capsule TAKE 1 CAPSULE BY MOUTH DAILY 90 capsule 1 3/5/2022 at Unknown time    lisinopril (PRINIVIL,ZESTRIL) 30 MG tablet Take 1 tablet by mouth Daily. 30 tablet 2 3/5/2022 at Unknown time    metFORMIN (GLUCOPHAGE) 500 MG tablet Take 1 tablet by mouth Daily With Breakfast. 30 tablet 1 3/5/2022 at Unknown time    metoprolol tartrate (LOPRESSOR) 50 MG tablet TAKE 1 TABLET BY MOUTH DAILY 90 tablet 1 3/6/2022 at Unknown time    pantoprazole (PROTONIX) 40 MG EC tablet Take 1 tablet by mouth Daily. 30 tablet 0 3/5/2022 at Unknown time    rivaroxaban (Xarelto) 20 MG tablet Take 1 tablet by mouth Daily. 28 tablet 0 3/6/2022 at Unknown time    rosuvastatin (CRESTOR)  10 MG tablet Take 1 tablet by mouth Every Night. 90 tablet 1 3/5/2022 at Unknown time    loratadine (CLARITIN) 10 MG tablet Take 10 mg by mouth Daily.        Current Meds:   Current Facility-Administered Medications   Medication Dose Route Frequency Provider Last Rate Last Admin    acetaminophen (TYLENOL) tablet 650 mg  650 mg Oral Q4H PRN Iraida Ortiz PA        Or    acetaminophen (TYLENOL) 160 MG/5ML solution 650 mg  650 mg Oral Q4H PRN Iraida Ortiz PA        Or    acetaminophen (TYLENOL) suppository 650 mg  650 mg Rectal Q4H PRN Iraida Ortiz PA        dextrose (D50W) (25 g/50 mL) IV injection 25 g  25 g Intravenous Q15 Min PRN Iraida Ortiz PA        dextrose (GLUTOSE) oral gel 15 g  15 g Oral Q15 Min PRN Iraida Ortiz PA        glucagon (human recombinant) (GLUCAGEN DIAGNOSTIC) injection 1 mg  1 mg Intramuscular Q15 Min PRN Iraida Ortiz PA        insulin lispro (ADMELOG) injection 0-7 Units  0-7 Units Subcutaneous TID AC Iraida Ortiz PA        lisinopril (PRINIVIL,ZESTRIL) tablet 30 mg  30 mg Oral Daily Iraida Ortiz PA        melatonin tablet 5 mg  5 mg Oral Nightly PRN Iraida Ortiz PA        nitroglycerin (NITROSTAT) SL tablet 0.4 mg  0.4 mg Sublingual Q5 Min PRN Iraida Ortiz PA        ondansetron (ZOFRAN) tablet 4 mg  4 mg Oral Q6H PRN Iraida Ortiz PA        Or    ondansetron (ZOFRAN) injection 4 mg  4 mg Intravenous Q6H PRN Iraida Ortiz PA        pantoprazole (PROTONIX) EC tablet 40 mg  40 mg Oral Daily Iraida Ortiz PA        rosuvastatin (CRESTOR) tablet 10 mg  10 mg Oral Nightly Iraida Ortiz PA   10 mg at 03/07/22 0018    sodium chloride 0.9 % flush 10 mL  10 mL Intravenous PRN Frank Gonzalez MD        sodium chloride 0.9 % flush 10 mL  10 mL Intravenous Q12H Iraida Ortiz PA   10 mL at 03/07/22 0019    sodium chloride 0.9 % flush 10 mL  10 mL Intravenous PRN Iraida Ortiz PA           Social History:   Social History     Tobacco  "Use    Smoking status: Former Smoker     Packs/day: 0.50     Types: Cigarettes     Quit date: 2020     Years since quittin.5    Smokeless tobacco: Never Used    Tobacco comment: QUIT 2 MONTHS AGO   Substance Use Topics    Alcohol use: Yes     Comment: OCCASIONALLY/ wine      Family History:  Family History   Problem Relation Age of Onset    Hypertension Father     Heart failure Father     Hyperlipidemia Father     Heart disease Father     Heart attack Father     Heart attack Sister     Stroke Mother     Heart disease Mother     Diabetes Mother     Kidney disease Maternal Grandmother         Review of Systems  Constitutional: No wt loss, fever   Gastrointestinal: No nausea , abdominal pain  Behavioral/Psych: No insomnia or anxiety   Cardiovascular ----positive for CP. All other systems reviewed and are negative                   Physical Exam  /68 (BP Location: Left arm, Patient Position: Lying)   Pulse 64   Temp 98.2 °F (36.8 °C) (Oral)   Resp 16   Ht 185.4 cm (73\")   Wt 87.5 kg (193 lb)   SpO2 99%   BMI 25.46 kg/m²     General appearance: No acute changes   Eyes: Sclerae conjunctivae normal, pupils reactive   HENT: Atraumatic; oropharynx clear with moist mucous membranes and no mucosal ulcerations;  Neck: Trachea midline; NECK, supple, no thyromegaly or lymphadenopathy   Lungs: Normal size and shape, normal breath sounds, equal distribution of air, no rales and rhonchi   CV: S1-S2 regular, no murmurs, no rub, no gallop   Abdomen: Soft, nontender; no masses , no abnormal abdominal sounds   Extremities: No deformity , normal color , no peripheral edema   Skin: Normal temperature, turgor and texture; no rash, ulcers  Psych: Appropriate affect, alert and oriented to person, place and time                   Cardiographics  ECG:         Telemetry: unavailable    Echocardiogram: pending  Interpretation Summary    Calculated EF = 45% Estimated EF was in agreement with the calculated EF. Ejection " fraction appears to be 41 - 45%.  Peak velocity of the flow distal to the aortic valve is 144.6 cm/s. Aortic valve maximum pressure gradient is 8.4 mmHg. Aortic valve mean pressure gradient is 5.1 mmHg. Aortic valve dimensionless index is 0.5 .  Mild tricuspid valve regurgitation is present. Calculated right ventricular systolic pressure from tricuspid regurgitation is 0 mmHg.  Mild mitral valve regurgitation is present.  Left atrial cavity size is moderately dilated.  Mild aortic valve regurgitation is present.  Right ventricular cavity is mildly dilated.  The left ventricular cavity is mildly dilated.  There is no evidence of pericardial effusion.     Interpretation Summary       Findings consistent with a normal ECG stress test.  Myocardial perfusion imaging indicates a normal myocardial perfusion study with no evidence of ischemia.  Impressions are consistent with a low risk study.  Thin apex normal variant    Impression:      Normal left main  Left anterior descending early atherosclerotic plaque with the first and the second diagonal branch at the ostial 50% stenosis minimum irregularity of the LAD diagonal and  branches otherwise  Circumflex artery was a nondominant minimum diffuse irregularity  Right coronary artery dominant with a minimum diffuse irregularity  Mild LV dysfunction EF of 45%     Recommendations:      Moderate coronary artery disease with mild LV dysfunction will be treated medically            I sincerely appreciate the opportunity to participate in your patient's care. Please feel free to contact me anytime if I can be of assistance in this or any other way.     Joel Rosado MD  8/25/2021  11:06 EDT      Imaging  Chest X-ray:   IMPRESSION:  Background changes of COPD. No definite acute findings.     This report was finalized on 3/6/2022 9:06 PM by Dr. Kandi Drew M.D.       Lab Review   Results from last 7 days   Lab Units 03/07/22  0522 03/07/22  0009  03/06/22 2128   TROPONIN T ng/mL <0.010 <0.010 <0.010         Results from last 7 days   Lab Units 03/07/22 0522   SODIUM mmol/L 139   POTASSIUM mmol/L 4.1   BUN mg/dL 26*   CREATININE mg/dL 0.77   CALCIUM mg/dL 8.3*     @[id number]@  Results from last 7 days   Lab Units 03/07/22 0522 03/06/22 2128   WBC 10*3/mm3 9.35 10.30   HEMOGLOBIN g/dL 13.8 14.0   HEMATOCRIT % 41.2 40.5   PLATELETS 10*3/mm3 127* 127*             Assessment:    Chest pain  Coronary disease  Hypertension  Hyperlipidemia  Paroxysmal atrial fibrillation   Anticoagulated    Recommendations / Plan:   This is an 81-year-old male who presented to the ER with complaints of chest pain.  He has a history to include CAD, paroxysmal anticoagulated on Xarelto, diabetes.  He had a cardiac catheterization in August 2021 that showed moderate CAD with mild LV dysfunction to be treated medically.  Troponin in ER negative x3.  Chest x-ray unremarkable.    Further recommendations pending stress test, echo.         Erum Hernandez, APRN  3/7/2022, 08:22 EST    Patient personally interviewed and above subjective findings personally confirmed during a face to face contact with patient today  All findings of physical examination confirmed  All pertinent and performed labs, cardiac procedures ,  radiographs of the last 24 hours personally reviewed  Impression and plans discussed/elaborated and implemented jointly as described above     Joel Rosado MD          EMR VisualOnon/Transcription:   Dictated utilizing Dragon dictation

## 2022-03-07 NOTE — ED NOTES
Pt in mask throughout encounter. This ERT was in appropriate ppe.     Maik Monaco, PCT  03/06/22 7812

## 2022-03-07 NOTE — CASE MANAGEMENT/SOCIAL WORK
Discharge Planning Assessment  [facility]     Patient Name: Doc Coyne  MRN: [id number]  Today's Date: 3/7/2022    Admit Date: 3/6/2022     Discharge Needs Assessment     Row Name 03/07/22 1312       Living Environment    People in Home alone    Current Living Arrangements home    Primary Care Provided by self    Provides Primary Care For pet(s)  Has a dog.    Family Caregiver if Needed none    Quality of Family Relationships non-existent    Able to Return to Prior Arrangements yes    Living Arrangement Comments Patient resides with his dog in 1 level apartment with 0 steps to enter. He denies environmental concerns.       Resource/Environmental Concerns    Resource/Environmental Concerns none    Transportation Concerns none       Transition Planning    Patient/Family Anticipates Transition to home    Patient/Family Anticipated Services at Transition community agency  Patient requesting information on in home personcal care agencies. Provided him with a list.    Transportation Anticipated car, drives self       Discharge Needs Assessment    Readmission Within the Last 30 Days no previous admission in last 30 days    Equipment Currently Used at Home none    Concerns to be Addressed no discharge needs identified;denies needs/concerns at this time    Anticipated Changes Related to Illness none    Equipment Needed After Discharge none    Outpatient/Agency/Support Group Needs homecare agency    Patient's Choice of Community Agency(s) Patient interested in utilizing in home personal care agency several times a week. Provided him with list of agencies.    Current Discharge Risk lives alone               Discharge Plan     Row Name 03/07/22 8147       Plan    Plan Patient plans to return home upon discharge, where he lives with his dog in 1 level apartment with 0 steps to enter. He denies environmental concerns. He reports baseline IND with IADL's and denies recent falls. He owns/uses no DME. He has a car,  drives, and reports no transportation concerns. However, he arrived to the hospital by ambulance and will need assistance with transportation home. Patient will be provided with cab voucher upon discharge. Patient uses [facility] [address]. He denies financial/medication compliance concerns. He denies HHC/rehab history. 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 Reports he is looking forward to this. Patient denies DC needs at this time, but I encouraged him to contact [facility] should his discharge needs change.    Patient/Family in Agreement with Plan yes    Plan Comments Plans to return home upon discharge. Will require assistance with transportation, as he arrived by ambulance and has no car here. Patient denies DC needs.              Continued Care and Services - Admitted Since 3/6/2022    Coordination has not been started for this encounter.          Demographic Summary     Row Name 22 1302       General Information    Admission Type observation    Arrived From home    Required Notices Provided Observation Status Notice    Expected Length of Stay (LOS) Less than 24 hours. Admitted to ED Observation Unit.    Referral Source admission list;emergency department    Reason for Consult discharge planning    Preferred Language English       Contact Information    Permission Granted to Share Info With family/designee    Contact Information Obtained for other (see comments)    Contact Information Comments Verified emergency contact information for friend, Ani.               Functional Status     Row Name 22 1310       Functional Status    Usual Activity Tolerance good    Current Activity Tolerance good       Functional Status, IADL    Medications independent    Meal Preparation independent    Housekeeping independent    Laundry independent     Shopping independent    IADL Comments IND with IADL's at baseline.       Mental Status    General Appearance WDL WDL       Mental Status Summary    Recent Changes in Mental Status/Cognitive Functioning no changes       Employment/    Employment Status retired    Current or Previous Occupation factory work    Employment/ Comments Former employee of [employer]               Psychosocial     Row Name 03/07/22 1311       Values/Beliefs    Spiritual, Cultural Beliefs, Yazidism Practices, Values that Affect Care no       Behavior WDL    Behavior WDL WDL       Emotion Mood WDL    Emotion/Mood/Affect WDL WDL       Speech WDL    Speech WDL WDL       Perceptual State WDL    Perceptual State WDL WDL       Thought Process WDL    Thought Process WDL WDL       Intellectual Performance WDL    Intellectual Performance WDL WDL       Coping/Stress    Major Change/Loss/Stressor other (see comments)  Patient reports he is moving back to his hometown in [place].    Patient Personal Strengths able to adapt;expressive of emotions;expressive of needs;self-reliant    Sources of Support none    Techniques to [place] with Loss/Stress/Change not applicable    Reaction to Health Status accepting;adjusting    Understanding of Condition and Treatment partial understanding of medical condition;partial understanding of treatment;needs additional information       Developmental Stage (Eriksson's)    Developmental Stage Stage 8 (66 years-death/Late Adulthood) Integrity vs. Despair       C-SSRS (Recent)    Q1 Wished to be Dead (Past Month) no    Q2 Suicidal Thoughts (Past Month) no    Q6 Suicide Behavior (Lifetime) no       Violence Risk    Feels Like Hurting Others no    Previous Attempt to Harm Others no               Abuse/Neglect     Row Name 03/07/22 1312       Personal Safety    Feels Unsafe at Home or Work/School no    Feels Threatened by Someone no    Does Anyone Try to Keep You From Having Contact with Others or Doing Things  Outside Your Home? no    Physical Signs of Abuse Present no               Legal     Row Name 03/07/22 1312       Financial/Legal    Source of Income social security;pension/detention               Substance Abuse    No documentation.                Patient Forms     Row Name 03/07/22 1322       Patient Forms    Provider Choice List Delivered  List of in home personal care agencies.    Delivered to Patient    Method of delivery In person                  Humphrey Mccartney RN

## 2022-03-07 NOTE — ED NOTES
Pt from home via EMS. Pt endorses midsternal chest pain w/o radiation and SOA started 1 hour ago while at rest. Pt has been out of his medications. History of afib, EMS reports seeing afib on the monitor. 324 aspirin and 1 SL nitro given in route. Pain improved after nitro. A&O x4 and ambulatory on scene.      Carina Molina, RHINA  03/06/22 2038       Carina Molina, RHINA  03/06/22 2040

## 2022-03-08 ENCOUNTER — READMISSION MANAGEMENT (OUTPATIENT)
Dept: CALL CENTER | Facility: HOSPITAL | Age: 82
End: 2022-03-08

## 2022-03-08 NOTE — OUTREACH NOTE
Prep Survey    Flowsheet Row Responses   [facility] facility patient discharged from? [place]   Is LACE score < 7 ? No   Emergency Room discharge w/ pulse ox? No   Eligibility Readm Mgmt   Discharge diagnosis Chest pain   Does the patient have one of the following disease processes/diagnoses(primary or secondary)? Other   Does the patient have Home health ordered? No   Is there a DME ordered? No   Comments regarding appointments see AVS   Prep survey completed? Yes          FABIOLA TABARES - Registered Nurse

## 2022-03-11 ENCOUNTER — READMISSION MANAGEMENT (OUTPATIENT)
Dept: CALL CENTER | Facility: HOSPITAL | Age: 82
End: 2022-03-11

## 2022-03-11 NOTE — TELEPHONE ENCOUNTER
LVM FOR PT TO CALL THE OFFICE.    SAMPLES ARE READY TO BE PICKED UP.   She just had these labs done yesterday afternoon. I sent a result note before 9 this morning.

## 2022-03-11 NOTE — OUTREACH NOTE
Medical Week 1 Survey    Flowsheet Row Responses   [facility] patient discharged from? [place]   Does the patient have one of the following disease processes/diagnoses(primary or secondary)? Other   Week 1 attempt successful? No   Unsuccessful attempts Attempt 1          ARPIT FRIEDMAN - Registered Nurse

## 2022-03-16 ENCOUNTER — READMISSION MANAGEMENT (OUTPATIENT)
Dept: CALL CENTER | Facility: HOSPITAL | Age: 82
End: 2022-03-16

## 2022-03-16 NOTE — OUTREACH NOTE
Medical Week 1 Survey    Flowsheet Row Responses   [facility] patient discharged from? [place]   Does the patient have one of the following disease processes/diagnoses(primary or secondary)? Other   Week 1 attempt successful? No   Unsuccessful attempts Attempt 2          ANALI Pittman Registered Nurse

## 2022-03-17 ENCOUNTER — APPOINTMENT (OUTPATIENT)
Dept: CARDIOLOGY | Facility: HOSPITAL | Age: 82
End: 2022-03-17

## 2022-03-22 ENCOUNTER — READMISSION MANAGEMENT (OUTPATIENT)
Dept: CALL CENTER | Facility: HOSPITAL | Age: 82
End: 2022-03-22

## 2022-03-22 NOTE — OUTREACH NOTE
Medical Week 1 Survey    Flowsheet Row Responses   [facility] patient discharged from? [place]   Does the patient have one of the following disease processes/diagnoses(primary or secondary)? Other   Week 1 attempt successful? No   Unsuccessful attempts Attempt 3          CARLA MUNOZ - Licensed Nurse

## 2022-03-24 ENCOUNTER — OFFICE VISIT (OUTPATIENT)
Dept: CARDIOLOGY | Facility: CLINIC | Age: 82
End: 2022-03-24

## 2022-03-24 VITALS
HEART RATE: 70 BPM | DIASTOLIC BLOOD PRESSURE: 82 MMHG | SYSTOLIC BLOOD PRESSURE: 150 MMHG | HEIGHT: 72 IN | WEIGHT: 204 LBS | BODY MASS INDEX: 27.63 KG/M2

## 2022-03-24 DIAGNOSIS — I25.10 ATHEROSCLEROSIS OF NATIVE CORONARY ARTERY OF NATIVE HEART WITHOUT ANGINA PECTORIS: ICD-10-CM

## 2022-03-24 DIAGNOSIS — R07.2 PRECORDIAL PAIN: Primary | ICD-10-CM

## 2022-03-24 DIAGNOSIS — I10 PRIMARY HYPERTENSION: ICD-10-CM

## 2022-03-24 PROCEDURE — 99213 OFFICE O/P EST LOW 20 MIN: CPT | Performed by: INTERNAL MEDICINE

## 2022-03-24 RX ORDER — NITROGLYCERIN 0.4 MG/1
TABLET SUBLINGUAL
COMMUNITY
Start: 2022-03-14 | End: 2022-09-07 | Stop reason: SDUPTHER

## 2022-03-30 ENCOUNTER — READMISSION MANAGEMENT (OUTPATIENT)
Dept: CALL CENTER | Facility: HOSPITAL | Age: 82
End: 2022-03-30

## 2022-03-30 NOTE — OUTREACH NOTE
Medical Week 2 Survey    Flowsheet Row Responses   [facility] patient discharged from? [place]   Does the patient have one of the following disease processes/diagnoses(primary or secondary)? Other   Week 2 attempt successful? No   Unsuccessful attempts Attempt 1          ROBERT Pittman Registered Nurse

## 2022-04-04 ENCOUNTER — READMISSION MANAGEMENT (OUTPATIENT)
Dept: CALL CENTER | Facility: HOSPITAL | Age: 82
End: 2022-04-04

## 2022-04-04 NOTE — OUTREACH NOTE
Medical Week 2 Survey    Flowsheet Row Responses   [facility] patient discharged from? [place]   Does the patient have one of the following disease processes/diagnoses(primary or secondary)? Other   Week 2 attempt successful? No   Unsuccessful attempts Attempt 2          ALISSON MUNOZ - Registered Nurse

## 2022-04-20 NOTE — TELEPHONE ENCOUNTER
Caller: Doc Coyne    Relationship: Self    Best call back number: [phone]      Requested Prescriptions:   Requested Prescriptions     Pending Prescriptions Disp Refills   • metFORMIN (GLUCOPHAGE) 500 MG tablet 30 tablet 1     Sig: Take 1 tablet by mouth Daily With Breakfast.        Pharmacy where request should be sent: [id number] [place], [place] - [address] [facility] [address] [facility] [phone] [facility] [phone]      Additional details provided by patient: HAS BEEN OUT OF MEDICATION FOR 2 DAYS   Does the patient have less than a 3 day supply:  [x] Yes  [] No    Mike Howe Rep   04/20/22 10:07 EDT

## 2022-05-25 RX ORDER — LISINOPRIL 30 MG/1
30 TABLET ORAL DAILY
Qty: 30 TABLET | Refills: 2 | Status: SHIPPED | OUTPATIENT
Start: 2022-05-25

## 2022-06-15 ENCOUNTER — TELEPHONE (OUTPATIENT)
Dept: CARDIOLOGY | Facility: CLINIC | Age: 82
End: 2022-06-15

## 2022-06-15 NOTE — TELEPHONE ENCOUNTER
----- Message from Kathy Corea sent at 6/14/2022 11:13 AM EDT -----  Regarding: WANTS SAMPLES OF XARELTO WANTS TO P/U TODAY  CALL WHEN READY  Contact: [phone]      Currently out of samples: patient is added to waiting list. Tried calling patient to inform.     Samples are ready for pick, tried calling patient no answer and no voicemail

## 2022-07-25 RX ORDER — METOPROLOL TARTRATE 50 MG/1
50 TABLET, FILM COATED ORAL DAILY
Qty: 90 TABLET | Refills: 1 | Status: SHIPPED | OUTPATIENT
Start: 2022-07-25 | End: 2023-02-21

## 2022-09-07 RX ORDER — NITROGLYCERIN 0.4 MG/1
0.4 TABLET SUBLINGUAL
Qty: 25 TABLET | Refills: 1 | Status: SHIPPED | OUTPATIENT
Start: 2022-09-07 | End: 2023-02-07 | Stop reason: SDUPTHER

## 2022-10-29 ENCOUNTER — APPOINTMENT (OUTPATIENT)
Dept: GENERAL RADIOLOGY | Facility: HOSPITAL | Age: 82
End: 2022-10-29

## 2022-10-29 ENCOUNTER — APPOINTMENT (OUTPATIENT)
Dept: CT IMAGING | Facility: HOSPITAL | Age: 82
End: 2022-10-29

## 2022-10-29 ENCOUNTER — HOSPITAL ENCOUNTER (EMERGENCY)
Facility: HOSPITAL | Age: 82
Discharge: HOME | End: 2022-10-29
Attending: EMERGENCY MEDICINE | Admitting: EMERGENCY MEDICINE

## 2022-10-29 VITALS
HEIGHT: 72 IN | TEMPERATURE: 97.6 F | OXYGEN SATURATION: 98 % | DIASTOLIC BLOOD PRESSURE: 80 MMHG | HEART RATE: 69 BPM | WEIGHT: 205 LBS | BODY MASS INDEX: 27.77 KG/M2 | SYSTOLIC BLOOD PRESSURE: 158 MMHG | RESPIRATION RATE: 14 BRPM

## 2022-10-29 DIAGNOSIS — E11.9 TYPE 2 DIABETES MELLITUS WITHOUT COMPLICATION, WITHOUT LONG-TERM CURRENT USE OF INSULIN: ICD-10-CM

## 2022-10-29 DIAGNOSIS — I10 ESSENTIAL HYPERTENSION: ICD-10-CM

## 2022-10-29 DIAGNOSIS — I25.10 ATHEROSCLEROSIS OF NATIVE CORONARY ARTERY OF NATIVE HEART WITHOUT ANGINA PECTORIS: ICD-10-CM

## 2022-10-29 DIAGNOSIS — J44.9 CHRONIC OBSTRUCTIVE PULMONARY DISEASE, UNSPECIFIED COPD TYPE: ICD-10-CM

## 2022-10-29 DIAGNOSIS — R07.89 ATYPICAL CHEST PAIN: Primary | ICD-10-CM

## 2022-10-29 DIAGNOSIS — I48.0 PAROXYSMAL ATRIAL FIBRILLATION: ICD-10-CM

## 2022-10-29 DIAGNOSIS — C61 PROSTATE CA: ICD-10-CM

## 2022-10-29 DIAGNOSIS — G45.9 TIA (TRANSIENT ISCHEMIC ATTACK): ICD-10-CM

## 2022-10-29 LAB
ALBUMIN SERPL-MCNC: 4 G/DL (ref 3.5–5.2)
ALBUMIN/GLOB SERPL: 1.5 G/DL
ALP SERPL-CCNC: 52 U/L (ref 39–117)
ALT SERPL W P-5'-P-CCNC: 21 U/L (ref 1–41)
ANION GAP SERPL CALCULATED.3IONS-SCNC: 7.8 MMOL/L (ref 5–15)
AST SERPL-CCNC: 17 U/L (ref 1–40)
BILIRUB SERPL-MCNC: 0.5 MG/DL (ref 0–1.2)
BUN SERPL-MCNC: 24 MG/DL (ref 8–23)
BUN/CREAT SERPL: 30 (ref 7–25)
CALCIUM SPEC-SCNC: 9 MG/DL (ref 8.6–10.5)
CHLORIDE SERPL-SCNC: 103 MMOL/L (ref 98–107)
CO2 SERPL-SCNC: 28.2 MMOL/L (ref 22–29)
CREAT SERPL-MCNC: 0.8 MG/DL (ref 0.76–1.27)
DEPRECATED RDW RBC AUTO: 43.5 FL (ref 37–54)
EGFRCR SERPLBLD CKD-EPI 2021: 88.9 ML/MIN/1.73
ERYTHROCYTE [DISTWIDTH] IN BLOOD BY AUTOMATED COUNT: 12.7 % (ref 12.3–15.4)
GLOBULIN UR ELPH-MCNC: 2.7 GM/DL
GLUCOSE SERPL-MCNC: 124 MG/DL (ref 65–99)
HCT VFR BLD AUTO: 42.7 % (ref 37.5–51)
HGB BLD-MCNC: 14.3 G/DL (ref 13–17.7)
LYMPHOCYTES # BLD MANUAL: 5.72 10*3/MM3 (ref 0.7–3.1)
LYMPHOCYTES NFR BLD MANUAL: 3 % (ref 5–12)
MCH RBC QN AUTO: 30.6 PG (ref 26.6–33)
MCHC RBC AUTO-ENTMCNC: 33.5 G/DL (ref 31.5–35.7)
MCV RBC AUTO: 91.2 FL (ref 79–97)
MONOCYTES # BLD: 0.28 10*3/MM3 (ref 0.1–0.9)
NEUTROPHILS # BLD AUTO: 3.38 10*3/MM3 (ref 1.7–7)
NEUTROPHILS NFR BLD MANUAL: 36 % (ref 42.7–76)
NT-PROBNP SERPL-MCNC: 756 PG/ML (ref 0–1800)
PLAT MORPH BLD: NORMAL
PLATELET # BLD AUTO: 114 10*3/MM3 (ref 140–450)
PMV BLD AUTO: 10.4 FL (ref 6–12)
POTASSIUM SERPL-SCNC: 4.4 MMOL/L (ref 3.5–5.2)
PROT SERPL-MCNC: 6.7 G/DL (ref 6–8.5)
QT INTERVAL: 391 MS
RBC # BLD AUTO: 4.68 10*6/MM3 (ref 4.14–5.8)
RBC MORPH BLD: NORMAL
SODIUM SERPL-SCNC: 139 MMOL/L (ref 136–145)
TROPONIN T SERPL-MCNC: <0.01 NG/ML (ref 0–0.03)
VARIANT LYMPHS NFR BLD MANUAL: 1 % (ref 0–5)
VARIANT LYMPHS NFR BLD MANUAL: 60 % (ref 19.6–45.3)
WBC MORPH BLD: NORMAL
WBC NRBC COR # BLD: 9.38 10*3/MM3 (ref 3.4–10.8)

## 2022-10-29 PROCEDURE — 93010 ELECTROCARDIOGRAM REPORT: CPT | Performed by: INTERNAL MEDICINE

## 2022-10-29 PROCEDURE — 85025 COMPLETE CBC W/AUTO DIFF WBC: CPT | Performed by: EMERGENCY MEDICINE

## 2022-10-29 PROCEDURE — 99284 EMERGENCY DEPT VISIT MOD MDM: CPT

## 2022-10-29 PROCEDURE — 80053 COMPREHEN METABOLIC PANEL: CPT | Performed by: EMERGENCY MEDICINE

## 2022-10-29 PROCEDURE — 71045 X-RAY EXAM CHEST 1 VIEW: CPT

## 2022-10-29 PROCEDURE — 84484 ASSAY OF TROPONIN QUANT: CPT | Performed by: EMERGENCY MEDICINE

## 2022-10-29 PROCEDURE — 93005 ELECTROCARDIOGRAM TRACING: CPT | Performed by: EMERGENCY MEDICINE

## 2022-10-29 PROCEDURE — 85007 BL SMEAR W/DIFF WBC COUNT: CPT | Performed by: EMERGENCY MEDICINE

## 2022-10-29 PROCEDURE — 83880 ASSAY OF NATRIURETIC PEPTIDE: CPT | Performed by: EMERGENCY MEDICINE

## 2022-10-29 PROCEDURE — 71250 CT THORAX DX C-: CPT

## 2022-11-14 ENCOUNTER — TELEPHONE (OUTPATIENT)
Dept: CARDIOLOGY | Facility: CLINIC | Age: 82
End: 2022-11-14

## 2022-11-14 NOTE — TELEPHONE ENCOUNTER
----- Message from Edel Oconnell sent at 11/14/2022 10:26 AM EST -----  Regarding: XARELTO 20 SAMPLES  Contact: [phone]  CAN  ON HIS APPT ON WED

## 2022-11-18 ENCOUNTER — OFFICE VISIT (OUTPATIENT)
Dept: CARDIOLOGY | Facility: CLINIC | Age: 82
End: 2022-11-18

## 2022-11-18 VITALS
DIASTOLIC BLOOD PRESSURE: 70 MMHG | BODY MASS INDEX: 27.5 KG/M2 | HEIGHT: 72 IN | HEART RATE: 64 BPM | SYSTOLIC BLOOD PRESSURE: 140 MMHG | WEIGHT: 203 LBS

## 2022-11-18 DIAGNOSIS — I25.10 ATHEROSCLEROSIS OF NATIVE CORONARY ARTERY OF NATIVE HEART WITHOUT ANGINA PECTORIS: ICD-10-CM

## 2022-11-18 DIAGNOSIS — Z79.01 ANTICOAGULATED: ICD-10-CM

## 2022-11-18 DIAGNOSIS — I48.20 CHRONIC ATRIAL FIBRILLATION: ICD-10-CM

## 2022-11-18 DIAGNOSIS — I10 ESSENTIAL HYPERTENSION: Primary | ICD-10-CM

## 2022-11-18 PROCEDURE — 99213 OFFICE O/P EST LOW 20 MIN: CPT

## 2022-11-18 PROCEDURE — 93000 ELECTROCARDIOGRAM COMPLETE: CPT

## 2022-11-20 ENCOUNTER — HOSPITAL ENCOUNTER (OUTPATIENT)
Facility: HOSPITAL | Age: 82
Setting detail: OBSERVATION
Discharge: HOME | End: 2022-11-21
Attending: EMERGENCY MEDICINE | Admitting: EMERGENCY MEDICINE

## 2022-11-20 DIAGNOSIS — L03.114 CELLULITIS OF LEFT ARM: Primary | ICD-10-CM

## 2022-11-20 LAB
ALBUMIN SERPL-MCNC: 4.3 G/DL (ref 3.5–5.2)
ALBUMIN/GLOB SERPL: 2 G/DL
ALP SERPL-CCNC: 52 U/L (ref 39–117)
ALT SERPL W P-5'-P-CCNC: 12 U/L (ref 1–41)
ANION GAP SERPL CALCULATED.3IONS-SCNC: 7.9 MMOL/L (ref 5–15)
AST SERPL-CCNC: 15 U/L (ref 1–40)
BILIRUB SERPL-MCNC: 0.9 MG/DL (ref 0–1.2)
BUN SERPL-MCNC: 20 MG/DL (ref 8–23)
BUN/CREAT SERPL: 23.8 (ref 7–25)
CALCIUM SPEC-SCNC: 9 MG/DL (ref 8.6–10.5)
CHLORIDE SERPL-SCNC: 105 MMOL/L (ref 98–107)
CO2 SERPL-SCNC: 28.1 MMOL/L (ref 22–29)
CREAT SERPL-MCNC: 0.84 MG/DL (ref 0.76–1.27)
DEPRECATED RDW RBC AUTO: 42.6 FL (ref 37–54)
EGFRCR SERPLBLD CKD-EPI 2021: 87.6 ML/MIN/1.73
EOSINOPHIL # BLD MANUAL: 0.16 10*3/MM3 (ref 0–0.4)
EOSINOPHIL NFR BLD MANUAL: 2 % (ref 0.3–6.2)
ERYTHROCYTE [DISTWIDTH] IN BLOOD BY AUTOMATED COUNT: 12.7 % (ref 12.3–15.4)
GLOBULIN UR ELPH-MCNC: 2.2 GM/DL
GLUCOSE SERPL-MCNC: 97 MG/DL (ref 65–99)
HCT VFR BLD AUTO: 40.6 % (ref 37.5–51)
HGB BLD-MCNC: 13.7 G/DL (ref 13–17.7)
LYMPHOCYTES # BLD MANUAL: 4.51 10*3/MM3 (ref 0.7–3.1)
LYMPHOCYTES NFR BLD MANUAL: 6 % (ref 5–12)
MCH RBC QN AUTO: 30.6 PG (ref 26.6–33)
MCHC RBC AUTO-ENTMCNC: 33.7 G/DL (ref 31.5–35.7)
MCV RBC AUTO: 90.6 FL (ref 79–97)
MONOCYTES # BLD: 0.47 10*3/MM3 (ref 0.1–0.9)
NEUTROPHILS # BLD AUTO: 2.64 10*3/MM3 (ref 1.7–7)
NEUTROPHILS NFR BLD MANUAL: 34 % (ref 42.7–76)
PLAT MORPH BLD: NORMAL
PLATELET # BLD AUTO: 123 10*3/MM3 (ref 140–450)
PMV BLD AUTO: 10.6 FL (ref 6–12)
POTASSIUM SERPL-SCNC: 4.4 MMOL/L (ref 3.5–5.2)
PROT SERPL-MCNC: 6.5 G/DL (ref 6–8.5)
RBC # BLD AUTO: 4.48 10*6/MM3 (ref 4.14–5.8)
RBC MORPH BLD: NORMAL
SODIUM SERPL-SCNC: 141 MMOL/L (ref 136–145)
VARIANT LYMPHS NFR BLD MANUAL: 58 % (ref 19.6–45.3)
WBC MORPH BLD: NORMAL
WBC NRBC COR # BLD: 7.77 10*3/MM3 (ref 3.4–10.8)

## 2022-11-20 PROCEDURE — 85007 BL SMEAR W/DIFF WBC COUNT: CPT | Performed by: EMERGENCY MEDICINE

## 2022-11-20 PROCEDURE — G0378 HOSPITAL OBSERVATION PER HR: HCPCS

## 2022-11-20 PROCEDURE — 25010000002 CEFTRIAXONE PER 250 MG: Performed by: EMERGENCY MEDICINE

## 2022-11-20 PROCEDURE — 80053 COMPREHEN METABOLIC PANEL: CPT | Performed by: EMERGENCY MEDICINE

## 2022-11-20 PROCEDURE — 99284 EMERGENCY DEPT VISIT MOD MDM: CPT

## 2022-11-20 PROCEDURE — 85025 COMPLETE CBC W/AUTO DIFF WBC: CPT | Performed by: EMERGENCY MEDICINE

## 2022-11-20 PROCEDURE — 96365 THER/PROPH/DIAG IV INF INIT: CPT

## 2022-11-20 RX ORDER — SODIUM CHLORIDE 0.9 % (FLUSH) 0.9 %
10 SYRINGE (ML) INJECTION EVERY 12 HOURS SCHEDULED
Status: DISCONTINUED | OUTPATIENT
Start: 2022-11-20 | End: 2022-11-21 | Stop reason: HOSPADM

## 2022-11-20 RX ORDER — ACETAMINOPHEN 325 MG/1
650 TABLET ORAL EVERY 4 HOURS PRN
Status: DISCONTINUED | OUTPATIENT
Start: 2022-11-20 | End: 2022-11-21 | Stop reason: HOSPADM

## 2022-11-20 RX ORDER — NICOTINE POLACRILEX 4 MG
15 LOZENGE BUCCAL
Status: DISCONTINUED | OUTPATIENT
Start: 2022-11-20 | End: 2022-11-21 | Stop reason: HOSPADM

## 2022-11-20 RX ORDER — SODIUM CHLORIDE 9 MG/ML
40 INJECTION, SOLUTION INTRAVENOUS AS NEEDED
Status: DISCONTINUED | OUTPATIENT
Start: 2022-11-20 | End: 2022-11-21 | Stop reason: HOSPADM

## 2022-11-20 RX ORDER — INSULIN LISPRO 100 [IU]/ML
0-7 INJECTION, SOLUTION INTRAVENOUS; SUBCUTANEOUS
Status: DISCONTINUED | OUTPATIENT
Start: 2022-11-21 | End: 2022-11-21 | Stop reason: HOSPADM

## 2022-11-20 RX ORDER — DEXTROSE MONOHYDRATE 25 G/50ML
25 INJECTION, SOLUTION INTRAVENOUS
Status: DISCONTINUED | OUTPATIENT
Start: 2022-11-20 | End: 2022-11-21 | Stop reason: HOSPADM

## 2022-11-20 RX ORDER — SODIUM CHLORIDE 0.9 % (FLUSH) 0.9 %
10 SYRINGE (ML) INJECTION AS NEEDED
Status: DISCONTINUED | OUTPATIENT
Start: 2022-11-20 | End: 2022-11-21 | Stop reason: HOSPADM

## 2022-11-20 RX ADMIN — CEFTRIAXONE SODIUM 1 G: 1 INJECTION, POWDER, FOR SOLUTION INTRAMUSCULAR; INTRAVENOUS at 18:18

## 2022-11-20 RX ADMIN — Medication 10 ML: at 20:06

## 2022-11-20 NOTE — ED PROVIDER NOTES
EMERGENCY DEPARTMENT ENCOUNTER    Room Number:  26/26  Date of encounter:  11/20/2022  PCP: Luiz Nicolas MD  Historian: Patient      HPI:  Chief Complaint: Left upper extremity rash    A complete HPI/ROS/PMH/PSH/SH/FH are unobtainable due to: N/A    Context: Doc Coyne is a 82 y.o. male who presents to the ED c/o left upper extremity rash that started a few weeks ago.  He states his arm initially was itching and he was scratching a lot.  For the past week or so, he has noticed increased erythema.  No pain.  No fevers or chills.  No cough, congestion or trouble breathing.  No myalgias or arthralgias.    Nurses note states rash of her bilateral lower extremities for 3 weeks-the location is incorrect, he only complains of a rash on his left upper extremity      Duration: 2 to 3 weeks  Onset: Gradual  Timing: Constant  Location: Left upper EXTR  Radiation: None  Quality: Itching  Intensity/Severity: Moderate  Progression: Worsening  Associated Symptoms: Pruritic  Aggravating Factors: None  Alleviating Factors: He feels better when he scratches it  Previous Episodes: Not recent  Treatment before arrival: His usual prescription medications    Summary of prior records: He has history of atrial fibrillation, coronary artery disease, BPH, COPD, type 2 diabetes, TIA, prostate cancer and hypertension.    The patient was placed in a mask in triage, hand hygiene was performed before and after my interaction with the patient.  I wore a mask, safety glasses and gloves during my entire interaction with the patient.    PAST MEDICAL HISTORY  Active Ambulatory Problems     Diagnosis Date Noted   • Atrial fibrillation (HCC) 02/08/2016   • Chest pain, atypical 02/08/2016   • Shortness of breath 02/08/2016   • Preop cardiovascular exam 02/08/2016   • Anticoagulated 05/14/2018   • Atherosclerotic heart disease of native coronary artery without angina pectoris 12/24/2019   • Hypertension 12/24/2019   • Benign prostatic  hyperplasia 12/24/2019   • COPD (chronic obstructive pulmonary disease) ([facility]) 12/24/2019   • Type 2 diabetes mellitus, without long-term current use of insulin ([facility]) 12/24/2019   • Bradycardia 12/24/2019   • Chest pain 12/24/2019   • TIA (transient ischemic attack) 02/27/2020   • Prostate CA ([facility]) 02/27/2020   • Visual changes 09/21/2020   • Atherosclerosis of native coronary artery of native heart without angina pectoris 06/21/2021   • Essential hypertension 06/21/2021   • Paroxysmal atrial fibrillation ([facility]) 06/21/2021     Resolved Ambulatory Problems     Diagnosis Date Noted   • No Resolved Ambulatory Problems     Past Medical History:   Diagnosis Date   • Benign prostatic hypertrophy    • Colon polyp    • Diabetes mellitus ([facility])    • Difficulty walking    • Hyperlipidemia    • Peripheral neuropathy    • Prostate cancer ([facility])    • Stroke ([facility])          PAST SURGICAL HISTORY  Past Surgical History:   Procedure Laterality Date   • CARDIAC CATHETERIZATION     • CARDIAC CATHETERIZATION N/A 8/25/2021    Procedure: Left Heart Cath;  Surgeon: Joel Rosado MD;  Location:  [facility] CATH INVASIVE LOCATION;  Service: Cardiology;  Laterality: N/A;   • CARDIAC CATHETERIZATION N/A 8/25/2021    Procedure: Coronary angiography;  Surgeon: Joel Rosado MD;  Location:  E[facility] CATH INVASIVE LOCATION;  Service: Cardiology;  Laterality: N/A;   • CARDIAC CATHETERIZATION N/A 8/25/2021    Procedure: Left ventriculography;  Surgeon: Joel Rosado MD;  Location:  ECHO CATH INVASIVE LOCATION;  Service: Cardiology;  Laterality: N/A;   • COLONOSCOPY     • HEMORRHOIDECTOMY     • PROSTATE SURGERY           FAMILY HISTORY  Family History   Problem Relation Age of Onset   • Hypertension Father    • Heart failure Father    • Hyperlipidemia Father    • Heart disease Father    • Heart attack Father    • Heart attack Sister    • Stroke Mother    • Heart disease Mother    • Diabetes Mother    • Kidney disease Maternal  Grandmother          SOCIAL HISTORY  Social History     Socioeconomic History   • Marital status:    Tobacco Use   • Smoking status: Former     Packs/day: 0.50     Types: Cigarettes     Quit date: 2020     Years since quittin.2   • Smokeless tobacco: Never   • Tobacco comments:     QUIT 2 MONTHS AGO   Vaping Use   • Vaping Use: Never used   Substance and Sexual Activity   • Alcohol use: Yes     Comment: OCCASIONALLY/ wine   • Drug use: No   • Sexual activity: Defer         ALLERGIES  Isosorbide nitrate, Novocain  [procaine], Penicillins, Propoxyphene, Cephalexin, Doxycycline, and Sulfa antibiotics        REVIEW OF SYSTEMS  Review of Systems   Constitutional: Negative for chills and fever.   Respiratory: Negative for chest tightness and shortness of breath.    Cardiovascular: Negative for chest pain.   Gastrointestinal: Negative for abdominal pain, blood in stool, diarrhea and nausea.   Genitourinary: Negative for dysuria and hematuria.   Skin: Positive for wound.        All systems reviewed and negative except for those discussed in HPI.       PHYSICAL EXAM    I have reviewed the triage vital signs and nursing notes.    ED Triage Vitals [22 1515]   Temp Heart Rate Resp BP SpO2   97.7 °F (36.5 °C) 72 18 -- 97 %      Temp src Heart Rate Source Patient Position BP Location FiO2 (%)   Tympanic Monitor -- -- --       Physical Exam   Constitutional: Pt. is oriented to person, place, and time and well-developed, well-nourished, and in no distress.   HENT: Normocephalic and atraumatic.   Neck: Normal range of motion. Neck supple. No JVD present.   Cardiovascular: Normal rate, regular rhythm and normal heart sounds. No murmur heard.  Pulmonary/Chest: Effort normal and breath sounds normal. No stridor. No respiratory distress. No wheezes, no rales.   Abdominal: Soft. Bowel sounds are normal. No distension. There is no tenderness. There is no rebound and no guarding.   Musculoskeletal: Left upper  extremity has linear excoriations over the distal forearm with surrounding erythema that continues approximately 2 just above the elbow.  The borders well demarcated.  There is no fluctuance or abscesses have eczema.  The remaining extremities exhibit normal range of motion. No edema, tenderness or deformity.   Neurological: Pt. is alert and oriented to person, place, and time.  He has no focal neurologic deficits  Skin: Described above  Psychiatric: Mood, affect and judgment normal.  He is pleasant and cooperative.  Nursing note and vitals reviewed.        LAB RESULTS  Recent Results (from the past 24 hour(s))   Comprehensive Metabolic Panel    Collection Time: 11/20/22  6:17 PM    Specimen: Blood   Result Value Ref Range    Glucose 97 65 - 99 mg/dL    BUN 20 8 - 23 mg/dL    Creatinine 0.84 0.76 - 1.27 mg/dL    Sodium 141 136 - 145 mmol/L    Potassium 4.4 3.5 - 5.2 mmol/L    Chloride 105 98 - 107 mmol/L    CO2 28.1 22.0 - 29.0 mmol/L    Calcium 9.0 8.6 - 10.5 mg/dL    Total Protein 6.5 6.0 - 8.5 g/dL    Albumin 4.30 3.50 - 5.20 g/dL    ALT (SGPT) 12 1 - 41 U/L    AST (SGOT) 15 1 - 40 U/L    Alkaline Phosphatase 52 39 - 117 U/L    Total Bilirubin 0.9 0.0 - 1.2 mg/dL    Globulin 2.2 gm/dL    A/G Ratio 2.0 g/dL    BUN/Creatinine Ratio 23.8 7.0 - 25.0    Anion Gap 7.9 5.0 - 15.0 mmol/L    eGFR 87.6 >60.0 mL/min/1.73   CBC Auto Differential    Collection Time: 11/20/22  6:17 PM    Specimen: Blood   Result Value Ref Range    WBC 7.77 3.40 - 10.80 10*3/mm3    RBC 4.48 4.14 - 5.80 10*6/mm3    Hemoglobin 13.7 13.0 - 17.7 g/dL    Hematocrit 40.6 37.5 - 51.0 %    MCV 90.6 79.0 - 97.0 fL    MCH 30.6 26.6 - 33.0 pg    MCHC 33.7 31.5 - 35.7 g/dL    RDW 12.7 12.3 - 15.4 %    RDW-SD 42.6 37.0 - 54.0 fl    MPV 10.6 6.0 - 12.0 fL    Platelets 123 (L) 140 - 450 10*3/mm3       Ordered the above labs and independently reviewed the results.        RADIOLOGY  No Radiology Exams Resulted Within Past 24 Hours    I ordered the above noted  radiological studies. Reviewed by me and discussed with radiologist.  See dictation for official radiology interpretation.      PROCEDURES    Procedures      MEDICATIONS GIVEN IN ER    Medications   cefTRIAXone (ROCEPHIN) 1 g in sodium chloride 0.9 % 100 mL IVPB-VTB (1 g Intravenous New Bag 11/20/22 1818)         PROGRESS, DATA ANALYSIS, CONSULTS, AND MEDICAL DECISION MAKING    Any/all labs have been independently reviewed by me.  Any/all radiology studies have been reviewed by me and discussed with radiologist dictating the report.   EKG's independently viewed and interpreted by me.  Discussion below represents my analysis of pertinent findings related to patient's condition, differential diagnosis, treatment plan and final disposition.    Number of Diagnoses or Management Options     Amount and/or Complexity of Data Reviewed  Clinical lab tests: Yes  Tests in the radiology section of CPT®: No  Tests in the medicine section of CPT®: Yes  Review and summarize past medical records:  (Yes - see HPI)  Independent visualization of images, tracings, or specimens: (Yes - see below)      ED Course as of 11/20/22 1901   Sun Nov 20, 2022   1843 Platelets(!): 123 [WC]   1900 Case discussed with NELSON Brown (on-call for [facility])-she accepts the patient to the observation unit on behalf of Dr. Valles. [WC]      ED Course User Index  [WC] Mateo Rob MD       AS OF 19:01 EST VITALS:    BP - 139/78  HR - 54  TEMP - 97.7 °F (36.5 °C) (Tympanic)  02 SATS - 96%        DIAGNOSIS  Final diagnoses:   Cellulitis of left arm         DISPOSITION  Obs unit          Note Disclaimer: At [facility], we believe that sharing information builds trust and better relationships. You are receiving this note because you recently visited [facility]. It is possible you will see health information before a provider has talked with you about it. This kind of information can be easy to  misunderstand. To help you fully understand what it means for your health, we urge you to discuss this note with your provider.\         Mateo Rob MD  11/20/22 3296

## 2022-11-20 NOTE — ED NOTES
Pt states he got the flu shot about 3 weeks ago. Pt states about a week later he got a rash on his left arm. Pt states that in the last couple of days he get a bump on his left arm increased redness/ swelling. Pt states that the rash on his left arm is itching a lot. Pt denies any fevers.

## 2022-11-21 ENCOUNTER — READMISSION MANAGEMENT (OUTPATIENT)
Dept: CALL CENTER | Facility: HOSPITAL | Age: 82
End: 2022-11-21

## 2022-11-21 VITALS
WEIGHT: 198 LBS | BODY MASS INDEX: 26.82 KG/M2 | OXYGEN SATURATION: 99 % | HEIGHT: 72 IN | RESPIRATION RATE: 16 BRPM | SYSTOLIC BLOOD PRESSURE: 145 MMHG | TEMPERATURE: 97.3 F | DIASTOLIC BLOOD PRESSURE: 85 MMHG | HEART RATE: 63 BPM

## 2022-11-21 LAB
ANION GAP SERPL CALCULATED.3IONS-SCNC: 10.9 MMOL/L (ref 5–15)
BUN SERPL-MCNC: 21 MG/DL (ref 8–23)
BUN/CREAT SERPL: 21.4 (ref 7–25)
CALCIUM SPEC-SCNC: 8.9 MG/DL (ref 8.6–10.5)
CHLORIDE SERPL-SCNC: 106 MMOL/L (ref 98–107)
CO2 SERPL-SCNC: 27.1 MMOL/L (ref 22–29)
CREAT SERPL-MCNC: 0.98 MG/DL (ref 0.76–1.27)
DEPRECATED RDW RBC AUTO: 44.5 FL (ref 37–54)
EGFRCR SERPLBLD CKD-EPI 2021: 77.5 ML/MIN/1.73
ERYTHROCYTE [DISTWIDTH] IN BLOOD BY AUTOMATED COUNT: 12.9 % (ref 12.3–15.4)
GLUCOSE BLDC GLUCOMTR-MCNC: 81 MG/DL (ref 70–130)
GLUCOSE SERPL-MCNC: 120 MG/DL (ref 65–99)
HCT VFR BLD AUTO: 41.6 % (ref 37.5–51)
HGB BLD-MCNC: 13.4 G/DL (ref 13–17.7)
MCH RBC QN AUTO: 30.2 PG (ref 26.6–33)
MCHC RBC AUTO-ENTMCNC: 32.2 G/DL (ref 31.5–35.7)
MCV RBC AUTO: 93.9 FL (ref 79–97)
PLATELET # BLD AUTO: 123 10*3/MM3 (ref 140–450)
PMV BLD AUTO: 10.8 FL (ref 6–12)
POTASSIUM SERPL-SCNC: 4.4 MMOL/L (ref 3.5–5.2)
RBC # BLD AUTO: 4.43 10*6/MM3 (ref 4.14–5.8)
SODIUM SERPL-SCNC: 144 MMOL/L (ref 136–145)
WBC NRBC COR # BLD: 8.48 10*3/MM3 (ref 3.4–10.8)

## 2022-11-21 PROCEDURE — 25010000002 CEFTRIAXONE PER 250 MG: Performed by: NURSE PRACTITIONER

## 2022-11-21 PROCEDURE — 80048 BASIC METABOLIC PNL TOTAL CA: CPT

## 2022-11-21 PROCEDURE — 96366 THER/PROPH/DIAG IV INF ADDON: CPT

## 2022-11-21 PROCEDURE — G0378 HOSPITAL OBSERVATION PER HR: HCPCS

## 2022-11-21 PROCEDURE — 85027 COMPLETE CBC AUTOMATED: CPT

## 2022-11-21 PROCEDURE — 82962 GLUCOSE BLOOD TEST: CPT

## 2022-11-21 RX ORDER — GINSENG 100 MG
1 CAPSULE ORAL 2 TIMES DAILY
Qty: 28 G | Refills: 0 | Status: SHIPPED | OUTPATIENT
Start: 2022-11-21

## 2022-11-21 RX ORDER — CEFDINIR 300 MG/1
300 CAPSULE ORAL 2 TIMES DAILY
Qty: 14 CAPSULE | Refills: 0 | Status: SHIPPED | OUTPATIENT
Start: 2022-11-21 | End: 2023-02-28

## 2022-11-21 RX ORDER — GINSENG 100 MG
1 CAPSULE ORAL EVERY 8 HOURS SCHEDULED
Status: DISCONTINUED | OUTPATIENT
Start: 2022-11-21 | End: 2022-11-21 | Stop reason: HOSPADM

## 2022-11-21 RX ADMIN — CEFTRIAXONE 2 G: 2 INJECTION, POWDER, FOR SOLUTION INTRAMUSCULAR; INTRAVENOUS at 10:25

## 2022-11-21 RX ADMIN — METOPROLOL TARTRATE 50 MG: 25 TABLET ORAL at 08:46

## 2022-11-21 RX ADMIN — METFORMIN HYDROCHLORIDE 500 MG: 500 TABLET ORAL at 08:46

## 2022-11-21 RX ADMIN — LISINOPRIL 30 MG: 20 TABLET ORAL at 08:46

## 2022-11-21 RX ADMIN — Medication 10 ML: at 08:47

## 2022-11-21 NOTE — PROGRESS NOTES
ED OBSERVATION PROGRESS/DISCHARGE SUMMARY    Date of Admission: 11/20/2022   LOS: 0 days   PCP: Luiz Nicolas MD    Final Diagnosis LUE cellulitis      Subjective     Hospital Outcome:   Patient is a pleasant afebrile ambulatory 82 year old  male admitted to the observation unit for LUE cellulitis.  He was recently given a flu shot and had some itching distal to it and has been admitted for cellulitis of the left forearm.    Patient was given a dose of IV cephalosporin and states the redness has not worsened.  His a.m. labs today show no leukocytosis.  He denies any fever or chills.  He is agreeable to discharge home today on oral and topical antibiotics.    ROS:  General: no fevers, chills  Respiratory: no cough, dyspnea  Cardiovascular: no chest pain, palpitations  Abdomen: No abdominal pain, nausea, vomiting, or diarrhea  Neurologic: No focal weakness    Objective   Physical Exam:  I have reviewed the vital signs.  Temp:  [97.3 °F (36.3 °C)-98 °F (36.7 °C)] 97.3 °F (36.3 °C)  Heart Rate:  [54-72] 63  Resp:  [16-18] 16  BP: (106-150)/(68-99) 145/85  General Appearance:    Alert, cooperative, no distress  Head:    Normocephalic, atraumatic  Eyes:    Sclerae anicteric  Neck:   Supple, no mass  Lungs: Clear to auscultation bilaterally, respirations unlabored  Heart: Regular rate and rhythm, S1 and S2 normal, no murmur, rub or gallop  Abdomen:  Soft, non-tender, bowel sounds active, nondistended  Extremities: No clubbing, cyanosis, or edema to lower extremities  Pulses:  2+ and symmetric in distal lower extremities  Skin: There is a baseball sized area of excoriated erythema to patient's left forearm/antecubital area with mild warmth and mild swelling  Neurologic: Oriented x3, Normal strength to extremities    Results Review:    I have reviewed the labs, radiology results and diagnostic studies.    Results from last 7 days   Lab Units 11/21/22  0421   WBC 10*3/mm3 8.48   HEMOGLOBIN g/dL 13.4   HEMATOCRIT  % 41.6   PLATELETS 10*3/mm3 123*     Results from last 7 days   Lab Units 11/21/22  0421 11/20/22  1817   SODIUM mmol/L 144 141   POTASSIUM mmol/L 4.4 4.4   CHLORIDE mmol/L 106 105   CO2 mmol/L 27.1 28.1   BUN mg/dL 21 20   CREATININE mg/dL 0.98 0.84   CALCIUM mg/dL 8.9 9.0   BILIRUBIN mg/dL  --  0.9   ALK PHOS U/L  --  52   ALT (SGPT) U/L  --  12   AST (SGOT) U/L  --  15   GLUCOSE mg/dL 120* 97     Imaging Results (Last 24 Hours)     ** No results found for the last 24 hours. **          I have reviewed the medications.  ---------------------------------------------------------------------------------------------  Assessment & Plan   Assessment/Problem List    Cellulitis      Plan:    Cellulitis  -Rocephin 1 g IV-start oral cephalosporins and topical bacitracin upon discharge     Diabetes  -Accu-Cheks AC  -Continue home metformin upon discharge     Hypertension/hyperlipidemia  -Chronic conditions  -Continue Lopressor/lisinopril     Atrial fibrillation  -Cardiac monitoring  -Continue Xarelto       Disposition: Home    Follow-up after Discharge: PCP    This note will serve as a discharge summary    NELSON Rogers 11/21/22 09:48 EST          I have worn appropriate PPE during this patient encounter, sanitized my hands both with entering and exiting patient's room.

## 2022-11-21 NOTE — PROGRESS NOTES
Clinical Pharmacy Services: Medication History    Doc Coyne is a 82 y.o. male presenting to [facility] for   Chief Complaint   Patient presents with   • Rash       He  has a past medical history of Atherosclerotic heart disease of native coronary artery without angina pectoris, Atrial fibrillation (HCC), Benign prostatic hypertrophy, Chest pain, Colon polyp, COPD (chronic obstructive pulmonary disease) (HCC), Diabetes mellitus (HCC), Difficulty walking, Hyperlipidemia, Hypertension, Peripheral neuropathy, Prostate cancer (HCC), and Stroke (HCC).    Allergies as of 11/20/2022 - Reviewed 11/20/2022   Allergen Reaction Noted   • Isosorbide nitrate  02/04/2016   • Novocain  [procaine]  02/04/2016   • Penicillins  02/04/2016   • Propoxyphene  02/04/2016   • Cephalexin Rash 07/23/2013   • Doxycycline Rash 12/10/2018   • Sulfa antibiotics Rash 08/01/2013       Medication information was obtained from: Patient brought in medication bottles.  Pharmacy and Phone Number:     Prior to Admission Medications     Prescriptions Last Dose Informant Patient Reported? Taking?    lisinopril (PRINIVIL,ZESTRIL) 30 MG tablet  Medication Bottle No Yes    TAKE 1 TABLET BY MOUTH DAILY    loratadine (CLARITIN) 10 MG tablet  Medication Bottle Yes Yes    Take 10 mg by mouth Daily.    metFORMIN (GLUCOPHAGE) 500 MG tablet  Medication Bottle No Yes    TAKE 1 TABLET BY MOUTH DAILY WITH BREAKFAST    metoprolol tartrate (LOPRESSOR) 50 MG tablet  Medication Bottle No Yes    TAKE 1 TABLET BY MOUTH DAILY    nitroglycerin (NITROSTAT) 0.4 MG SL tablet  Medication Bottle No Yes    Place 1 tablet under the tongue Every 5 (Five) Minutes As Needed for Chest Pain. Take no more than 3 doses in 15 minutes.    rivaroxaban (Xarelto) 20 MG tablet  Medication Bottle No Yes    Take 1 tablet by mouth Daily.            Medication notes:     This medication list is complete to the best of my knowledge as of 11/20/2022    Please call if  questions.    Laura Diana  Medication History Technician   [phone]    11/20/2022 19:39 EST

## 2022-11-21 NOTE — PROGRESS NOTES
KENA[facility] ATTESTATION NOTE    The GISELLA and I have discussed this patient's history, physical exam, and treatment plan.  I have reviewed the documentation and personally had a face to face interaction with the patient. I affirm the documentation and agree with the treatment and plan.  The attached note describes my personal findings.      I provided a substantive portion of the care of this patient. I personally performed the physical exam, in its entirety.    Doc Coyne is a 82 y.o. male who presented to the emergency department yesterday complaining of left arm redness.  He was felt to have possible cellulitis.  He had recently had a flu vaccine in his left arm.  He was admitted to the observation unit for IV antibiotics and further evaluation.  He does report a history of eczema.  He states that he has been scratching it extensively.  In the emergency room his white blood cell count was 7.7.  He did not have a left shift.  His white blood cell count is 8.4 this morning.  On exam I am not entirely convinced that this represents a cellulitis.  He has been scratching it extensively.  It is erythematous and irritated and I think this is from direct trauma from scratching.      On exam:  GENERAL: Awake, alert, no acute distress  SKIN: Warm, dry  HENT: Normocephalic, atraumatic  EYES: no scleral icterus  CV: regular rhythm, regular rate  RESPIRATORY: normal effort, lungs clear  ABDOMEN: soft, nontender, nondistended  MUSCULOSKELETAL: no deformity, left upper extremity in the antecubital fossa with erythema and excoriation.  No streaking.  Pulses strong.  Sensation and motor intact.  No abscess.  NEURO: alert, moves all extremities, follows commands        Labs  Recent Results (from the past 24 hour(s))   Comprehensive Metabolic Panel    Collection Time: 11/20/22  6:17 PM    Specimen: Blood   Result Value Ref Range    Glucose 97 65 - 99 mg/dL    BUN 20 8 - 23 mg/dL    Creatinine 0.84 0.76 - 1.27 mg/dL    Sodium 141  136 - 145 mmol/L    Potassium 4.4 3.5 - 5.2 mmol/L    Chloride 105 98 - 107 mmol/L    CO2 28.1 22.0 - 29.0 mmol/L    Calcium 9.0 8.6 - 10.5 mg/dL    Total Protein 6.5 6.0 - 8.5 g/dL    Albumin 4.30 3.50 - 5.20 g/dL    ALT (SGPT) 12 1 - 41 U/L    AST (SGOT) 15 1 - 40 U/L    Alkaline Phosphatase 52 39 - 117 U/L    Total Bilirubin 0.9 0.0 - 1.2 mg/dL    Globulin 2.2 gm/dL    A/G Ratio 2.0 g/dL    BUN/Creatinine Ratio 23.8 7.0 - 25.0    Anion Gap 7.9 5.0 - 15.0 mmol/L    eGFR 87.6 >60.0 mL/min/1.73   CBC Auto Differential    Collection Time: 11/20/22  6:17 PM    Specimen: Blood   Result Value Ref Range    WBC 7.77 3.40 - 10.80 10*3/mm3    RBC 4.48 4.14 - 5.80 10*6/mm3    Hemoglobin 13.7 13.0 - 17.7 g/dL    Hematocrit 40.6 37.5 - 51.0 %    MCV 90.6 79.0 - 97.0 fL    MCH 30.6 26.6 - 33.0 pg    MCHC 33.7 31.5 - 35.7 g/dL    RDW 12.7 12.3 - 15.4 %    RDW-SD 42.6 37.0 - 54.0 fl    MPV 10.6 6.0 - 12.0 fL    Platelets 123 (L) 140 - 450 10*3/mm3   Manual Differential    Collection Time: 11/20/22  6:17 PM    Specimen: Blood   Result Value Ref Range    Neutrophil % 34.0 (L) 42.7 - 76.0 %    Lymphocyte % 58.0 (H) 19.6 - 45.3 %    Monocyte % 6.0 5.0 - 12.0 %    Eosinophil % 2.0 0.3 - 6.2 %    Neutrophils Absolute 2.64 1.70 - 7.00 10*3/mm3    Lymphocytes Absolute 4.51 (H) 0.70 - 3.10 10*3/mm3    Monocytes Absolute 0.47 0.10 - 0.90 10*3/mm3    Eosinophils Absolute 0.16 0.00 - 0.40 10*3/mm3    RBC Morphology Normal Normal    WBC Morphology Normal Normal    Platelet Morphology Normal Normal   CBC (No Diff)    Collection Time: 11/21/22  4:21 AM    Specimen: Blood   Result Value Ref Range    WBC 8.48 3.40 - 10.80 10*3/mm3    RBC 4.43 4.14 - 5.80 10*6/mm3    Hemoglobin 13.4 13.0 - 17.7 g/dL    Hematocrit 41.6 37.5 - 51.0 %    MCV 93.9 79.0 - 97.0 fL    MCH 30.2 26.6 - 33.0 pg    MCHC 32.2 31.5 - 35.7 g/dL    RDW 12.9 12.3 - 15.4 %    RDW-SD 44.5 37.0 - 54.0 fl    MPV 10.8 6.0 - 12.0 fL    Platelets 123 (L) 140 - 450 10*3/mm3   Basic  Metabolic Panel    Collection Time: 11/21/22  4:21 AM    Specimen: Blood   Result Value Ref Range    Glucose 120 (H) 65 - 99 mg/dL    BUN 21 8 - 23 mg/dL    Creatinine 0.98 0.76 - 1.27 mg/dL    Sodium 144 136 - 145 mmol/L    Potassium 4.4 3.5 - 5.2 mmol/L    Chloride 106 98 - 107 mmol/L    CO2 27.1 22.0 - 29.0 mmol/L    Calcium 8.9 8.6 - 10.5 mg/dL    BUN/Creatinine Ratio 21.4 7.0 - 25.0    Anion Gap 10.9 5.0 - 15.0 mmol/L    eGFR 77.5 >60.0 mL/min/1.73   POC Glucose Once    Collection Time: 11/21/22  7:26 AM    Specimen: Blood   Result Value Ref Range    Glucose 81 70 - 130 mg/dL       Radiology  No Radiology Exams Resulted Within Past 24 Hours        I am not entirely convinced that this represents a cellulitis.  His white blood cell count is normal.  He is afebrile.  Plan local wound care, home today.    PPE: The patient and I wore a mask throughout the entire patient encounter.        Note Disclaimer: At [facility], we believe that sharing information builds trust and better relationships. You are receiving this note because you recently visited [facility]. It is possible you will see health information before a provider has talked with you about it. This kind of information can be easy to misunderstand. To help you fully understand what it means for your health, we urge you to discuss this note with your provider.

## 2022-11-21 NOTE — PROGRESS NOTES
MD ATTESTATION NOTE    The GISELLA and I have discussed this patient's history, physical exam, and treatment plan.  I have reviewed the documentation and personally had a face to face interaction with the patient. I affirm the documentation and agree with the treatment and plan.  The attached note describes my personal findings.      I provided a substantive portion of the care of the patient.  I personally performed the physical exam in its entirety, and below are my findings.  For this patient encounter, the patient wore surgical mask, I wore full protective PPE including N95 and eye protection.      Brief HPI: Patient presents complaining of left upper extremity rash going on for a month.  States that initially had some itching and then after scratching it it became red and puffy.  No fevers or chills no cough no chest pain or shortness of breath.  States he has been sleeping with his dog and the dog sometimes bangs and scratches his arm    PHYSICAL EXAM  ED Triage Vitals   Temp Heart Rate Resp BP SpO2   11/20/22 1515 11/20/22 1515 11/20/22 1515 11/20/22 1732 11/20/22 1515   97.7 °F (36.5 °C) 72 18 124/96 97 %      Temp src Heart Rate Source Patient Position BP Location FiO2 (%)   11/20/22 1515 11/20/22 1515 -- -- --   Tympanic Monitor            GENERAL: no acute distress  HENT: nares patent  EYES: no scleral icterus  CV: regular rhythm, normal rate  RESPIRATORY: normal effort  ABDOMEN: soft  MUSCULOSKELETAL: no deformity  NEURO: alert, moves all extremities, follows commands  PSYCH:  calm, cooperative  SKIN: warm, dry, linear excoriations with surrounding erythema and swelling left forearm extends from distal forearm to just above the elbow range of motion of the joint is painless; no significant fluctuance or abscess appreciated    Vital signs and nursing notes reviewed.        Plan: IV antibiotics will monitor and reassess in a.m.

## 2022-11-21 NOTE — ED NOTES
".Nursing report ED to floor  Doc Coyne  82 y.o.  male    HPI :   Chief Complaint   Patient presents with    Rash       Admitting doctor:   Valentino POON MD    Admitting diagnosis:   The encounter diagnosis was Cellulitis of left arm.    Code status:   Current Code Status       Date Active Code Status Order ID Comments User Context       Prior            Allergies:   Isosorbide nitrate, Novocain  [procaine], Penicillins, Propoxyphene, Cephalexin, Doxycycline, and Sulfa antibiotics    Isolation:   No active isolations    Intake and Output    Intake/Output Summary (Last 24 hours) at 11/20/2022 1918  Last data filed at 11/20/2022 1848  Gross per 24 hour   Intake 100 ml   Output --   Net 100 ml       Weight:       11/20/22 1807   Weight: 95.3 kg (210 lb)       Most recent vitals:   Vitals:    11/20/22 1807 11/20/22 1900 11/20/22 1901 11/20/22 1902   BP: 139/78  150/99    Pulse: 54  71    Resp:       Temp:       TempSrc:       SpO2: 96% 96%  96%   Weight: 95.3 kg (210 lb)      Height: 182.9 cm (72\")          Active LDAs/IV Access:   Lines, Drains & Airways       Active LDAs       Name Placement date Placement time Site Days    Peripheral IV 10/29/22 1354 Right Forearm 10/29/22  1354  Forearm  22    Peripheral IV 11/20/22 1816 Right Antecubital 11/20/22  1816  Antecubital  less than 1                    Labs (abnormal labs have a star):   Labs Reviewed   CBC WITH AUTO DIFFERENTIAL - Abnormal; Notable for the following components:       Result Value    Platelets 123 (*)     All other components within normal limits   COMPREHENSIVE METABOLIC PANEL    Narrative:     GFR Normal >60  Chronic Kidney Disease <60  Kidney Failure <15    The GFR formula is only valid for adults with stable renal function between ages 18 and 70.   MANUAL DIFFERENTIAL   CBC AND DIFFERENTIAL    Narrative:     The following orders were created for panel order CBC & Differential.  Procedure                               Abnormality      "    Status                     ---------                               -----------         ------                     CBC Auto Differential[[id number]]        Abnormal            Final result                 Please view results for these tests on the individual orders.       EKG:   No orders to display       Meds given in ED:   Medications   cefTRIAXone (ROCEPHIN) 1 g in sodium chloride 0.9 % 100 mL IVPB-VTB (0 g Intravenous Stopped 22 7465)       Imaging results:  No radiology results for the last day    Ambulatory status:   - Up with assistance     Social issues:   Social History     Socioeconomic History    Marital status:    Tobacco Use    Smoking status: Former     Packs/day: 0.50     Types: Cigarettes     Quit date: 2020     Years since quittin.2    Smokeless tobacco: Never    Tobacco comments:     QUIT 2 MONTHS AGO   Vaping Use    Vaping Use: Never used   Substance and Sexual Activity    Alcohol use: Yes     Comment: OCCASIONALLY/ wine    Drug use: No    Sexual activity: Defer       NIH Stroke Scale:         Cathy Alvarez RN  22 19:18 EST

## 2022-11-21 NOTE — CASE MANAGEMENT/SOCIAL WORK
Discharge Planning Assessment  [facility]     Patient Name: Doc Coyne  MRN: [id number]  Today's Date: 11/20/2022    Admit Date: 11/20/2022    Plan: Pt intends to return home upon discharge   Discharge Needs Assessment     Row Name 11/20/22 1937       Living Environment    People in Home alone    Current Living Arrangements apartment    Primary Care Provided by self    Provides Primary Care For no one    Family Caregiver if Needed none  Pt states he has a child that lives in [place], but is not in his life    Quality of Family Relationships unable to assess    Able to Return to Prior Arrangements yes    Living Arrangement Comments Pt has a small dog at home that he has made arrangements for over night and that is all, if he is to be in hospital > 2 days, he will be concerned over his dog       Resource/Environmental Concerns    Resource/Environmental Concerns none    Transportation Concerns none       Transition Planning    Patient/Family Anticipates Transition to home    Patient/Family Anticipated Services at Transition none    Transportation Anticipated car, drives self       Discharge Needs Assessment    Readmission Within the Last 30 Days no previous admission in last 30 days    Equipment Currently Used at Home none;other (see comments)  Life alert    Concerns to be Addressed no discharge needs identified;denies needs/concerns at this time    Anticipated Changes Related to Illness none    Equipment Needed After Discharge none    Provided Post Acute Provider List? N/A    Provided Post Acute Provider Quality & Resource List? N/A               Discharge Plan     Row Name 11/20/22 1940       Plan    Plan Pt intends to return home upon discharge    Patient/Family in Agreement with Plan yes    Provided Post Acute Provider List? N/A    Provided Post Acute Provider Quality & Resource List? N/A    Plan Comments Face sheet verified, role of CCP explained. Pt is independent in ADL's and denies the need for any  assistive devices. Pt denies any difficulty paying for medications. Advised pt that if any further needs arise, to contact case management              Continued Care and Services - Admitted Since 11/20/2022    Coordination has not been started for this encounter.          Demographic Summary    No documentation.                Functional Status    No documentation.                Psychosocial    No documentation.                Abuse/Neglect    No documentation.                Legal    No documentation.                Substance Abuse    No documentation.                Patient Forms    No documentation.                   Myla Cobos RN

## 2022-11-21 NOTE — PLAN OF CARE
Goal Outcome Evaluation:   Pt admitted from ED for cellulitis of LUE. Tolerated IV antibiotic as ordered. Denies any pain. C/o some itching to LUE.

## 2022-11-21 NOTE — H&P
[facility]   HISTORY AND PHYSICAL    Patient Name: Doc Coyne  : 1940  MRN: [id number]  Primary Care Physician:  Luiz Nicolas MD  Date of admission: 2022    Subjective   Subjective     Chief Complaint: Cellulitis  History of Present Illness  Doc Coyne is a 82-year-old male that presented to the emergency department with redness and rash on his left upper extremity.  He states that it has been itching and he has been scratching a lot.  He denies any pain, fever, or chills.  He has a past medical history including, but not limited to atrial fibrillation, stroke, coronary artery disease, COPD, diabetes, hypertension, and hyperlipidemia.    Review of Systems     Personal History     Past Medical History:   Diagnosis Date   • Atherosclerotic heart disease of native coronary artery without angina pectoris    • Atrial fibrillation (HCC)    • Benign prostatic hypertrophy    • Chest pain    • Colon polyp    • COPD (chronic obstructive pulmonary disease) (HCC)    • Diabetes mellitus (HCC)    • Difficulty walking    • Hyperlipidemia    • Hypertension    • Peripheral neuropathy    • Prostate cancer (HCC)    • Stroke (HCC)        Past Surgical History:   Procedure Laterality Date   • CARDIAC CATHETERIZATION     • CARDIAC CATHETERIZATION N/A 2021    Procedure: Left Heart Cath;  Surgeon: Joel Rosado MD;  Location: [facility] CATH INVASIVE LOCATION;  Service: Cardiology;  Laterality: N/A;   • CARDIAC CATHETERIZATION N/A 2021    Procedure: Coronary angiography;  Surgeon: Joel Rosado MD;  Location:  ECHO CATH INVASIVE LOCATION;  Service: Cardiology;  Laterality: N/A;   • CARDIAC CATHETERIZATION N/A 2021    Procedure: Left ventriculography;  Surgeon: Joel Rosado MD;  Location:  ECHO CATH INVASIVE LOCATION;  Service: Cardiology;  Laterality: N/A;   • COLONOSCOPY     • HEMORRHOIDECTOMY     • PROSTATE SURGERY         Family History: family history  includes Diabetes in his mother; Heart attack in his father and sister; Heart disease in his father and mother; Heart failure in his father; Hyperlipidemia in his father; Hypertension in his father; Kidney disease in his maternal grandmother; Stroke in his mother. Otherwise pertinent FHx was reviewed and not pertinent to current issue.    Social History:  reports that he quit smoking about 2 years ago. His smoking use included cigarettes. He smoked an average of .5 packs per day. He has never used smokeless tobacco. He reports current alcohol use. He reports that he does not use drugs.    Home Medications:  lisinopril, loratadine, metFORMIN, metoprolol tartrate, nitroglycerin, rivaroxaban, and rosuvastatin    Allergies:  Allergies   Allergen Reactions   • Isosorbide Nitrate    • Novocain  [Procaine]    • Penicillins    • Propoxyphene    • Cephalexin Rash   • Doxycycline Rash   • Sulfa Antibiotics Rash       Objective    Objective     Vitals:   Temp:  [97.7 °F (36.5 °C)] 97.7 °F (36.5 °C)  Heart Rate:  [54-72] 71  Resp:  [18] 18  BP: (124-150)/(78-99) 150/99    Physical Exam    Result Review    Result Review:  I have personally reviewed the results from the time of this admission to 11/20/2022 19:37 EST and agree with these findings:  [x]  Laboratory list / accordion  []  Microbiology  []  Radiology  []  EKG/Telemetry   []  Cardiology/Vascular   []  Pathology  []  Old records  []  Other      Assessment & Plan   Assessment / Plan     Brief Patient Summary:  Doc Coyne is a 82 y.o. male who was admitted to the observation unit for further evaluation of his cellulitis.    Active Hospital Problems:  Active Hospital Problems    Diagnosis    • **Cellulitis      Plan:   Cellulitis  -Cardiac monitor  -Vital signs every 4 hours  -Rocephin 1 g IV-given in ER  -Repeat labs in a.m.  -As needed medication for itching    Diabetes  -Accu-Cheks AC  -Continue home metformin  -Hypoglycemia protocol-low-dose insulin  correction    Hypertension/hyperlipidemia  -Chronic conditions  -Continue Lopressor/lisinopril    Atrial fibrillation  -Cardiac monitoring  -Continue Xarelto    DVT prophylaxis:  Mechanical DVT prophylaxis orders are present.    CODE STATUS:    Code Status (Patient has no pulse and is not breathing): CPR (Attempt to Resuscitate)  Medical Interventions (Patient has pulse or is breathing): Full Support    Admission Status:  I believe this patient meets observation status.    Kassandra Correia, APRN

## 2022-11-22 NOTE — OUTREACH NOTE
Prep Survey    Flowsheet Row Responses   [facility] discharged [facility]? [place]   Is LACE score < 7 ? No   Emergency Room discharge w/ pulse ox? No   Eligibility Readm Mgmt   Discharge diagnosis LUE cellulitis, DM   Does the patient have one of the following disease processes/diagnoses(primary or secondary)? Other   Does the patient have Home health ordered? No   Is there a DME ordered? No   Prep survey completed? Yes          NISHANT MUNOZ - Registered Nurse

## 2022-11-29 ENCOUNTER — READMISSION MANAGEMENT (OUTPATIENT)
Dept: CALL CENTER | Facility: HOSPITAL | Age: 82
End: 2022-11-29

## 2022-11-29 NOTE — OUTREACH NOTE
Medical Week 1 Survey    Flowsheet Row Responses   [facility] patient discharged from? [place]   Does the patient have one of the following disease processes/diagnoses(primary or secondary)? Other   Week 1 attempt successful? No   Unsuccessful attempts Attempt 1  [attempted patient mobile, home and friend- elisha]          SUMAN PASTRANA - Registered Nurse

## 2022-12-05 ENCOUNTER — READMISSION MANAGEMENT (OUTPATIENT)
Dept: CALL CENTER | Facility: HOSPITAL | Age: 82
End: 2022-12-05

## 2022-12-05 NOTE — OUTREACH NOTE
Medical Week 1 Survey    Flowsheet Row Responses   [facility] patient discharged from? [place]   Does the patient have one of the following disease processes/diagnoses(primary or secondary)? Other   Week 1 attempt successful? No   Unsuccessful attempts Attempt 2          TESS CHOI - Registered Nurse

## 2022-12-09 ENCOUNTER — READMISSION MANAGEMENT (OUTPATIENT)
Dept: CALL CENTER | Facility: HOSPITAL | Age: 82
End: 2022-12-09

## 2022-12-09 RX ORDER — LISINOPRIL 30 MG/1
30 TABLET ORAL DAILY
Qty: 30 TABLET | Refills: 2 | OUTPATIENT
Start: 2022-12-09

## 2022-12-09 NOTE — OUTREACH NOTE
Medical Week 1 Survey    Flowsheet Row Responses   [facility] [place] [facility]? [place]   Does the patient have one of the following disease processes/diagnoses(primary or secondary)? Other   Week 1 attempt successful? Yes   Call start time 1522   Revoke Readmitted  [[facility]]   Discharge diagnosis SURYA cellulitis, BC TABARES - Registered Nurse

## 2022-12-15 ENCOUNTER — APPOINTMENT (OUTPATIENT)
Dept: CT IMAGING | Facility: HOSPITAL | Age: 82
End: 2022-12-15

## 2022-12-15 ENCOUNTER — HOSPITAL ENCOUNTER (EMERGENCY)
Facility: HOSPITAL | Age: 82
Discharge: HOME | End: 2022-12-15
Attending: EMERGENCY MEDICINE | Admitting: EMERGENCY MEDICINE

## 2022-12-15 VITALS
DIASTOLIC BLOOD PRESSURE: 85 MMHG | SYSTOLIC BLOOD PRESSURE: 149 MMHG | RESPIRATION RATE: 18 BRPM | OXYGEN SATURATION: 97 % | HEART RATE: 72 BPM | HEIGHT: 72 IN | BODY MASS INDEX: 27.5 KG/M2 | TEMPERATURE: 97.1 F | WEIGHT: 203 LBS

## 2022-12-15 DIAGNOSIS — Z79.01 CHRONIC ANTICOAGULATION: ICD-10-CM

## 2022-12-15 DIAGNOSIS — S00.83XA TRAUMATIC HEMATOMA OF FOREHEAD, INITIAL ENCOUNTER: Primary | ICD-10-CM

## 2022-12-15 PROCEDURE — 99283 EMERGENCY DEPT VISIT LOW MDM: CPT

## 2022-12-15 PROCEDURE — 70450 CT HEAD/BRAIN W/O DYE: CPT
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
## 2022-12-15 NOTE — CASE MANAGEMENT/SOCIAL WORK
Informed ED provider that patient's insurance is out of network with [facility]. FERNANDA AlcantaraN RN
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
## 2022-12-15 NOTE — DISCHARGE INSTRUCTIONS
Ice for pain or swelling, Tylenol for pain control, PCP follow-up within 1 week for recheck as needed, ED return for worsening symptoms as needed.

## 2023-01-26 ENCOUNTER — TELEPHONE (OUTPATIENT)
Dept: CARDIOLOGY | Facility: HOSPITAL | Age: 83
End: 2023-01-26
Payer: MEDICARE

## 2023-01-26 NOTE — TELEPHONE ENCOUNTER
----- Message from Lori Marinelli sent at 1/26/2023  1:06 PM EST -----  Regarding: NEED SAMPLES  PATIENT NEEDS 20 MG XARELTO SAMPLES PLEASE CALL [phone] WHEN READY FOR .  THANKS

## 2023-02-07 RX ORDER — NITROGLYCERIN 0.4 MG/1
0.4 TABLET SUBLINGUAL
Qty: 25 TABLET | Refills: 1 | Status: SHIPPED | OUTPATIENT
Start: 2023-02-07

## 2023-02-07 NOTE — TELEPHONE ENCOUNTER
Caller: Doc Coyne    Relationship: Self    Best call back number: [phone]    Requested Prescriptions:   Requested Prescriptions     Pending Prescriptions Disp Refills   • nitroglycerin (NITROSTAT) 0.4 MG SL tablet 25 tablet 1     Sig: Place 1 tablet under the tongue Every 5 (Five) Minutes As Needed for Chest Pain. Take no more than 3 doses in 15 minutes.        Pharmacy where request should be sent: [facility] #[id number] - [place], [place] - [address] [facility] [phone] [facility] [phone] FX     Additional details provided by patient: PT LOST HIS NITRO    Does the patient have less than a 3 day supply:  [x] Yes  [] No    Would you like a call back once the refill request has been completed: [x] Yes [] No    If the office needs to give you a call back, can they leave a voicemail: [x] Yes [] No    Mike Flynn Rep   02/07/23 10:24 EST

## 2023-02-21 RX ORDER — METOPROLOL TARTRATE 50 MG/1
50 TABLET, FILM COATED ORAL DAILY
Qty: 90 TABLET | Refills: 1 | Status: SHIPPED | OUTPATIENT
Start: 2023-02-21

## 2023-02-28 ENCOUNTER — OFFICE VISIT (OUTPATIENT)
Dept: CARDIOLOGY | Facility: CLINIC | Age: 83
End: 2023-02-28
Payer: MEDICARE

## 2023-02-28 VITALS
DIASTOLIC BLOOD PRESSURE: 75 MMHG | HEIGHT: 72 IN | HEART RATE: 61 BPM | WEIGHT: 197 LBS | SYSTOLIC BLOOD PRESSURE: 126 MMHG | BODY MASS INDEX: 26.68 KG/M2

## 2023-02-28 DIAGNOSIS — Z79.01 ANTICOAGULATED: ICD-10-CM

## 2023-02-28 DIAGNOSIS — I10 PRIMARY HYPERTENSION: ICD-10-CM

## 2023-02-28 DIAGNOSIS — I48.20 CHRONIC ATRIAL FIBRILLATION: ICD-10-CM

## 2023-02-28 DIAGNOSIS — I10 ESSENTIAL HYPERTENSION: Primary | ICD-10-CM

## 2023-02-28 PROCEDURE — 3074F SYST BP LT 130 MM HG: CPT

## 2023-02-28 PROCEDURE — 99213 OFFICE O/P EST LOW 20 MIN: CPT

## 2023-02-28 PROCEDURE — 1159F MED LIST DOCD IN RCRD: CPT

## 2023-02-28 PROCEDURE — 93000 ELECTROCARDIOGRAM COMPLETE: CPT

## 2023-02-28 PROCEDURE — 1160F RVW MEDS BY RX/DR IN RCRD: CPT

## 2023-02-28 PROCEDURE — 3078F DIAST BP <80 MM HG: CPT

## 2023-02-28 RX ORDER — BETAMETHASONE DIPROPIONATE 0.5 MG/G
LOTION TOPICAL
COMMUNITY
Start: 2023-02-01

## 2023-02-28 RX ORDER — CLOBETASOL PROPIONATE 0.46 MG/ML
SOLUTION TOPICAL
COMMUNITY
Start: 2023-02-17

## 2023-04-27 PROBLEM — E86.0 SEVERE DEHYDRATION: Status: RESOLVED | Noted: 2023-04-21 | Resolved: 2023-04-27

## 2023-04-27 PROBLEM — E87.0 HYPERNATREMIA: Status: RESOLVED | Noted: 2023-04-21 | Resolved: 2023-04-27

## (undated) DEVICE — TR BAND RADIAL ARTERY COMPRESSION DEVICE: Brand: TR BAND

## (undated) DEVICE — CATH DIAG IMPULSE FR4 5F 100CM

## (undated) DEVICE — GW EMR FIX EXCHG J STD .035 3MM 260CM

## (undated) DEVICE — KT MANIFLD CARDIAC

## (undated) DEVICE — CATH4F INF PIG 145Â° MOD 110CM: Brand: INFINITI

## (undated) DEVICE — PK CATH CARD 40

## (undated) DEVICE — RADIFOCUS GLIDEWIRE: Brand: GLIDEWIRE

## (undated) DEVICE — GLIDESHEATH SLENDER STAINLESS STEEL KIT: Brand: GLIDESHEATH SLENDER

## (undated) DEVICE — CATH DIAG IMPULSE FL3.5 5F 100CM